# Patient Record
Sex: FEMALE | Race: WHITE | NOT HISPANIC OR LATINO | Employment: OTHER | URBAN - NONMETROPOLITAN AREA
[De-identification: names, ages, dates, MRNs, and addresses within clinical notes are randomized per-mention and may not be internally consistent; named-entity substitution may affect disease eponyms.]

---

## 2019-10-10 ENCOUNTER — HOSPITAL ENCOUNTER (INPATIENT)
Facility: HOSPITAL | Age: 74
LOS: 5 days | Discharge: NURSING, REHAB OR LONG-TERM CARE | End: 2019-10-17
Attending: EMERGENCY MEDICINE | Admitting: INTERNAL MEDICINE

## 2019-10-10 ENCOUNTER — APPOINTMENT (OUTPATIENT)
Dept: CT IMAGING | Facility: HOSPITAL | Age: 74
End: 2019-10-10

## 2019-10-10 ENCOUNTER — APPOINTMENT (OUTPATIENT)
Dept: GENERAL RADIOLOGY | Facility: HOSPITAL | Age: 74
End: 2019-10-10

## 2019-10-10 DIAGNOSIS — Z74.09 IMPAIRED MOBILITY AND ADLS: ICD-10-CM

## 2019-10-10 DIAGNOSIS — Z74.09 IMPAIRED FUNCTIONAL MOBILITY AND ENDURANCE: ICD-10-CM

## 2019-10-10 DIAGNOSIS — Z78.9 IMPAIRED MOBILITY AND ADLS: ICD-10-CM

## 2019-10-10 DIAGNOSIS — N10 ACUTE PYELONEPHRITIS: Primary | ICD-10-CM

## 2019-10-10 LAB
ALBUMIN SERPL-MCNC: 3.3 G/DL (ref 3.5–5.2)
ALBUMIN/GLOB SERPL: 0.9 G/DL
ALP SERPL-CCNC: 81 U/L (ref 39–117)
ALT SERPL W P-5'-P-CCNC: 11 U/L (ref 1–33)
ANION GAP SERPL CALCULATED.3IONS-SCNC: 12 MMOL/L (ref 5–15)
AST SERPL-CCNC: 14 U/L (ref 1–32)
BACTERIA UR QL AUTO: ABNORMAL /HPF
BASOPHILS # BLD AUTO: 0.02 10*3/MM3 (ref 0–0.2)
BASOPHILS NFR BLD AUTO: 0.2 % (ref 0–1.5)
BILIRUB SERPL-MCNC: 0.4 MG/DL (ref 0.2–1.2)
BILIRUB UR QL STRIP: NEGATIVE
BUN BLD-MCNC: 25 MG/DL (ref 8–23)
BUN/CREAT SERPL: 20.7 (ref 7–25)
CALCIUM SPEC-SCNC: 8.8 MG/DL (ref 8.6–10.5)
CHLORIDE SERPL-SCNC: 99 MMOL/L (ref 98–107)
CLARITY UR: ABNORMAL
CO2 SERPL-SCNC: 25 MMOL/L (ref 22–29)
COLOR UR: YELLOW
CREAT BLD-MCNC: 1.21 MG/DL (ref 0.57–1)
D-LACTATE SERPL-SCNC: 1.5 MMOL/L (ref 0.5–2)
DEPRECATED RDW RBC AUTO: 38.7 FL (ref 37–54)
EOSINOPHIL # BLD AUTO: 0 10*3/MM3 (ref 0–0.4)
EOSINOPHIL NFR BLD AUTO: 0 % (ref 0.3–6.2)
ERYTHROCYTE [DISTWIDTH] IN BLOOD BY AUTOMATED COUNT: 13 % (ref 12.3–15.4)
FLUAV AG NPH QL: NEGATIVE
FLUBV AG NPH QL IA: NEGATIVE
GFR SERPL CREATININE-BSD FRML MDRD: 43 ML/MIN/1.73
GLOBULIN UR ELPH-MCNC: 3.7 GM/DL
GLUCOSE BLD-MCNC: 136 MG/DL (ref 65–99)
GLUCOSE BLDC GLUCOMTR-MCNC: 82 MG/DL (ref 70–130)
GLUCOSE UR STRIP-MCNC: NEGATIVE MG/DL
HCT VFR BLD AUTO: 32.4 % (ref 34–46.6)
HGB BLD-MCNC: 10.8 G/DL (ref 12–15.9)
HGB UR QL STRIP.AUTO: ABNORMAL
HOLD SPECIMEN: NORMAL
HOLD SPECIMEN: NORMAL
HYALINE CASTS UR QL AUTO: ABNORMAL /LPF
IMM GRANULOCYTES # BLD AUTO: 0.04 10*3/MM3 (ref 0–0.05)
IMM GRANULOCYTES NFR BLD AUTO: 0.4 % (ref 0–0.5)
KETONES UR QL STRIP: NEGATIVE
LEUKOCYTE ESTERASE UR QL STRIP.AUTO: ABNORMAL
LIPASE SERPL-CCNC: 23 U/L (ref 13–60)
LYMPHOCYTES # BLD AUTO: 0.39 10*3/MM3 (ref 0.7–3.1)
LYMPHOCYTES NFR BLD AUTO: 4.1 % (ref 19.6–45.3)
MCH RBC QN AUTO: 27.1 PG (ref 26.6–33)
MCHC RBC AUTO-ENTMCNC: 33.3 G/DL (ref 31.5–35.7)
MCV RBC AUTO: 81.4 FL (ref 79–97)
MONOCYTES # BLD AUTO: 0.91 10*3/MM3 (ref 0.1–0.9)
MONOCYTES NFR BLD AUTO: 9.7 % (ref 5–12)
NEUTROPHILS # BLD AUTO: 8.04 10*3/MM3 (ref 1.7–7)
NEUTROPHILS NFR BLD AUTO: 85.6 % (ref 42.7–76)
NITRITE UR QL STRIP: POSITIVE
NRBC BLD AUTO-RTO: 0 /100 WBC (ref 0–0.2)
PH UR STRIP.AUTO: 6 [PH] (ref 5–9)
PLATELET # BLD AUTO: 168 10*3/MM3 (ref 140–450)
PMV BLD AUTO: 9.2 FL (ref 6–12)
POTASSIUM BLD-SCNC: 3.8 MMOL/L (ref 3.5–5.2)
PROT SERPL-MCNC: 7 G/DL (ref 6–8.5)
PROT UR QL STRIP: ABNORMAL
RBC # BLD AUTO: 3.98 10*6/MM3 (ref 3.77–5.28)
RBC # UR: ABNORMAL /HPF
REF LAB TEST METHOD: ABNORMAL
SODIUM BLD-SCNC: 136 MMOL/L (ref 136–145)
SP GR UR STRIP: 1.01 (ref 1–1.03)
SQUAMOUS #/AREA URNS HPF: ABNORMAL /HPF
UROBILINOGEN UR QL STRIP: ABNORMAL
WBC NRBC COR # BLD: 9.4 10*3/MM3 (ref 3.4–10.8)
WBC UR QL AUTO: ABNORMAL /HPF
WHOLE BLOOD HOLD SPECIMEN: NORMAL
WHOLE BLOOD HOLD SPECIMEN: NORMAL

## 2019-10-10 PROCEDURE — G0378 HOSPITAL OBSERVATION PER HR: HCPCS

## 2019-10-10 PROCEDURE — 80053 COMPREHEN METABOLIC PANEL: CPT | Performed by: EMERGENCY MEDICINE

## 2019-10-10 PROCEDURE — 25010000002 CEFTRIAXONE: Performed by: EMERGENCY MEDICINE

## 2019-10-10 PROCEDURE — 87088 URINE BACTERIA CULTURE: CPT | Performed by: EMERGENCY MEDICINE

## 2019-10-10 PROCEDURE — 87186 SC STD MICRODIL/AGAR DIL: CPT | Performed by: EMERGENCY MEDICINE

## 2019-10-10 PROCEDURE — 83690 ASSAY OF LIPASE: CPT | Performed by: EMERGENCY MEDICINE

## 2019-10-10 PROCEDURE — 99285 EMERGENCY DEPT VISIT HI MDM: CPT

## 2019-10-10 PROCEDURE — 81001 URINALYSIS AUTO W/SCOPE: CPT | Performed by: EMERGENCY MEDICINE

## 2019-10-10 PROCEDURE — 71046 X-RAY EXAM CHEST 2 VIEWS: CPT

## 2019-10-10 PROCEDURE — 83605 ASSAY OF LACTIC ACID: CPT | Performed by: EMERGENCY MEDICINE

## 2019-10-10 PROCEDURE — 85025 COMPLETE CBC W/AUTO DIFF WBC: CPT | Performed by: EMERGENCY MEDICINE

## 2019-10-10 PROCEDURE — 87086 URINE CULTURE/COLONY COUNT: CPT | Performed by: EMERGENCY MEDICINE

## 2019-10-10 PROCEDURE — 25010000002 HEPARIN (PORCINE) PER 1000 UNITS: Performed by: NURSE PRACTITIONER

## 2019-10-10 PROCEDURE — 74177 CT ABD & PELVIS W/CONTRAST: CPT

## 2019-10-10 PROCEDURE — 82962 GLUCOSE BLOOD TEST: CPT

## 2019-10-10 PROCEDURE — 87804 INFLUENZA ASSAY W/OPTIC: CPT | Performed by: EMERGENCY MEDICINE

## 2019-10-10 PROCEDURE — 25010000002 ONDANSETRON PER 1 MG: Performed by: NURSE PRACTITIONER

## 2019-10-10 PROCEDURE — 25010000002 IOPAMIDOL 61 % SOLUTION: Performed by: EMERGENCY MEDICINE

## 2019-10-10 RX ORDER — MELOXICAM 7.5 MG/1
7.5 TABLET ORAL DAILY
COMMUNITY
End: 2019-10-17 | Stop reason: HOSPADM

## 2019-10-10 RX ORDER — SERTRALINE HYDROCHLORIDE 25 MG/1
25 TABLET, FILM COATED ORAL DAILY
COMMUNITY

## 2019-10-10 RX ORDER — SODIUM CHLORIDE 0.9 % (FLUSH) 0.9 %
10 SYRINGE (ML) INJECTION AS NEEDED
Status: DISCONTINUED | OUTPATIENT
Start: 2019-10-10 | End: 2019-10-17 | Stop reason: HOSPADM

## 2019-10-10 RX ORDER — SIMETHICONE 80 MG
80 TABLET,CHEWABLE ORAL EVERY 6 HOURS PRN
COMMUNITY

## 2019-10-10 RX ORDER — ATORVASTATIN CALCIUM 10 MG/1
10 TABLET, FILM COATED ORAL NIGHTLY
Status: DISCONTINUED | OUTPATIENT
Start: 2019-10-10 | End: 2019-10-17 | Stop reason: HOSPADM

## 2019-10-10 RX ORDER — INSULIN GLARGINE 100 [IU]/ML
50 INJECTION, SOLUTION SUBCUTANEOUS DAILY
COMMUNITY

## 2019-10-10 RX ORDER — DEXTROSE MONOHYDRATE 25 G/50ML
25 INJECTION, SOLUTION INTRAVENOUS
Status: DISCONTINUED | OUTPATIENT
Start: 2019-10-10 | End: 2019-10-17 | Stop reason: HOSPADM

## 2019-10-10 RX ORDER — HYDROCHLOROTHIAZIDE 25 MG/1
25 TABLET ORAL DAILY
Status: DISCONTINUED | OUTPATIENT
Start: 2019-10-11 | End: 2019-10-11

## 2019-10-10 RX ORDER — METOPROLOL TARTRATE 50 MG/1
50 TABLET, FILM COATED ORAL DAILY
Status: DISCONTINUED | OUTPATIENT
Start: 2019-10-11 | End: 2019-10-17 | Stop reason: HOSPADM

## 2019-10-10 RX ORDER — HYDROCHLOROTHIAZIDE 25 MG/1
25 TABLET ORAL DAILY
COMMUNITY

## 2019-10-10 RX ORDER — ATORVASTATIN CALCIUM 10 MG/1
10 TABLET, FILM COATED ORAL NIGHTLY
COMMUNITY

## 2019-10-10 RX ORDER — ACETAMINOPHEN 325 MG/1
TABLET ORAL
Status: DISCONTINUED
Start: 2019-10-10 | End: 2019-10-17 | Stop reason: HOSPADM

## 2019-10-10 RX ORDER — ACETAMINOPHEN 325 MG/1
650 TABLET ORAL ONCE
Status: DISCONTINUED | OUTPATIENT
Start: 2019-10-10 | End: 2019-10-10

## 2019-10-10 RX ORDER — ACETAMINOPHEN 650 MG/1
650 SUPPOSITORY RECTAL ONCE
Status: COMPLETED | OUTPATIENT
Start: 2019-10-10 | End: 2019-10-10

## 2019-10-10 RX ORDER — RISPERIDONE 0.25 MG/1
0.25 TABLET ORAL DAILY
COMMUNITY

## 2019-10-10 RX ORDER — ACETAMINOPHEN 325 MG/1
650 TABLET ORAL EVERY 4 HOURS PRN
Status: DISCONTINUED | OUTPATIENT
Start: 2019-10-10 | End: 2019-10-17 | Stop reason: HOSPADM

## 2019-10-10 RX ORDER — PANTOPRAZOLE SODIUM 40 MG/1
40 TABLET, DELAYED RELEASE ORAL DAILY
COMMUNITY

## 2019-10-10 RX ORDER — ACETAMINOPHEN 500 MG
500 TABLET ORAL EVERY 6 HOURS PRN
COMMUNITY

## 2019-10-10 RX ORDER — ACETAMINOPHEN 160 MG/5ML
650 SOLUTION ORAL EVERY 4 HOURS PRN
Status: DISCONTINUED | OUTPATIENT
Start: 2019-10-10 | End: 2019-10-17 | Stop reason: HOSPADM

## 2019-10-10 RX ORDER — CLOPIDOGREL BISULFATE 75 MG/1
75 TABLET ORAL DAILY
Status: DISCONTINUED | OUTPATIENT
Start: 2019-10-11 | End: 2019-10-17 | Stop reason: HOSPADM

## 2019-10-10 RX ORDER — CLONAZEPAM 0.5 MG/1
0.5 TABLET ORAL 2 TIMES DAILY
Status: DISCONTINUED | OUTPATIENT
Start: 2019-10-10 | End: 2019-10-17 | Stop reason: HOSPADM

## 2019-10-10 RX ORDER — NICOTINE POLACRILEX 4 MG
15 LOZENGE BUCCAL
Status: DISCONTINUED | OUTPATIENT
Start: 2019-10-10 | End: 2019-10-17 | Stop reason: HOSPADM

## 2019-10-10 RX ORDER — CLONAZEPAM 0.5 MG/1
0.5 TABLET ORAL 3 TIMES DAILY PRN
COMMUNITY
End: 2019-10-17 | Stop reason: HOSPADM

## 2019-10-10 RX ORDER — ONDANSETRON 2 MG/ML
4 INJECTION INTRAMUSCULAR; INTRAVENOUS EVERY 6 HOURS PRN
Status: DISCONTINUED | OUTPATIENT
Start: 2019-10-10 | End: 2019-10-17 | Stop reason: HOSPADM

## 2019-10-10 RX ORDER — RISPERIDONE 0.25 MG/1
0.25 TABLET ORAL DAILY
Status: DISCONTINUED | OUTPATIENT
Start: 2019-10-10 | End: 2019-10-17 | Stop reason: HOSPADM

## 2019-10-10 RX ORDER — CETIRIZINE HYDROCHLORIDE 5 MG/1
5 TABLET ORAL DAILY
Status: DISCONTINUED | OUTPATIENT
Start: 2019-10-10 | End: 2019-10-11

## 2019-10-10 RX ORDER — SODIUM CHLORIDE 9 MG/ML
50 INJECTION, SOLUTION INTRAVENOUS CONTINUOUS
Status: DISCONTINUED | OUTPATIENT
Start: 2019-10-10 | End: 2019-10-13

## 2019-10-10 RX ORDER — PANTOPRAZOLE SODIUM 40 MG/1
40 TABLET, DELAYED RELEASE ORAL
Status: DISCONTINUED | OUTPATIENT
Start: 2019-10-11 | End: 2019-10-17 | Stop reason: HOSPADM

## 2019-10-10 RX ORDER — SODIUM CHLORIDE 0.9 % (FLUSH) 0.9 %
10 SYRINGE (ML) INJECTION EVERY 12 HOURS SCHEDULED
Status: DISCONTINUED | OUTPATIENT
Start: 2019-10-10 | End: 2019-10-17 | Stop reason: HOSPADM

## 2019-10-10 RX ORDER — HEPARIN SODIUM 5000 [USP'U]/ML
5000 INJECTION, SOLUTION INTRAVENOUS; SUBCUTANEOUS EVERY 12 HOURS SCHEDULED
Status: DISCONTINUED | OUTPATIENT
Start: 2019-10-10 | End: 2019-10-17 | Stop reason: HOSPADM

## 2019-10-10 RX ORDER — CLOPIDOGREL BISULFATE 75 MG/1
75 TABLET ORAL DAILY
COMMUNITY

## 2019-10-10 RX ORDER — METOPROLOL TARTRATE 50 MG/1
50 TABLET, FILM COATED ORAL DAILY
COMMUNITY

## 2019-10-10 RX ORDER — ACETAMINOPHEN 650 MG/1
650 SUPPOSITORY RECTAL EVERY 4 HOURS PRN
Status: DISCONTINUED | OUTPATIENT
Start: 2019-10-10 | End: 2019-10-17 | Stop reason: HOSPADM

## 2019-10-10 RX ADMIN — ACETAMINOPHEN 650 MG: 650 SUPPOSITORY RECTAL at 18:46

## 2019-10-10 RX ADMIN — HEPARIN SODIUM 5000 UNITS: 5000 INJECTION INTRAVENOUS; SUBCUTANEOUS at 22:03

## 2019-10-10 RX ADMIN — SODIUM CHLORIDE 75 ML/HR: 9 INJECTION, SOLUTION INTRAVENOUS at 22:07

## 2019-10-10 RX ADMIN — ATORVASTATIN CALCIUM 10 MG: 10 TABLET, FILM COATED ORAL at 22:05

## 2019-10-10 RX ADMIN — SODIUM CHLORIDE, PRESERVATIVE FREE 10 ML: 5 INJECTION INTRAVENOUS at 22:06

## 2019-10-10 RX ADMIN — CEFTRIAXONE 1 G: 1 INJECTION, POWDER, FOR SOLUTION INTRAMUSCULAR; INTRAVENOUS at 17:50

## 2019-10-10 RX ADMIN — CLONAZEPAM 0.5 MG: 0.5 TABLET ORAL at 22:03

## 2019-10-10 RX ADMIN — ONDANSETRON 4 MG: 2 INJECTION INTRAMUSCULAR; INTRAVENOUS at 22:04

## 2019-10-10 RX ADMIN — IOPAMIDOL 80 ML: 612 INJECTION, SOLUTION INTRAVENOUS at 16:12

## 2019-10-10 RX ADMIN — ACETAMINOPHEN: 325 SUPPOSITORY RECTAL at 18:50

## 2019-10-10 RX ADMIN — CETIRIZINE HYDROCHLORIDE 5 MG: 5 TABLET ORAL at 22:04

## 2019-10-10 RX ADMIN — RISPERIDONE 0.25 MG: 0.25 TABLET ORAL at 22:05

## 2019-10-10 NOTE — H&P
[facility] Medicine Admission      Date of Admission: 10/10/2019      Primary Care Physician: Austyn Pelayo MD      Chief Complaint: Fever, cough and fatigue    HPI:  This is a 74 year old  female with PMH of CAD, anxiety, DM II, HTN and HLD that presented from a SNF secondary to fever, chills and fatigue.  The patient is deaf and unable to give any medical history.  Patient's sister is at bedside and states the patient had some issues with constipation yesterday and was given fleets enemas yesterday.  She became lethargic and spiked a fever this am.  CT of the abdomen and pelvis shows perinephric left-sided inflammatory changes around the left kidney consistent with pyelonephritis.      Concurrent Medical History: CAD, DM II, anxiety, HTN and HLD.      Past Surgical History:  has no past surgical history on file.    Family History: HTN    Social History:  Patient does not smoke or drink.     Allergies: No Known Allergies    Medications:   Plavix 75 mg daily  Lipitor 10 mg daily  PRN Tylenol every 6 hours  Hydrochlorothiazide 25 mg daily  Basaglar Insulin 50 units daily  Metformin 1,000 mg twice a day  Lopressor 50 mg twice a day  Protonix 40 mg daily  Risperdal at bedtime  Klonopin 0.25 mg TID PRN    Review of Systems:  Altered mental status     Otherwise complete ROS is negative except as mentioned above.    Physical Exam:   Temp:  [97.7 °F (36.5 °C)-104.5 °F (40.3 °C)] 104.5 °F (40.3 °C)  Heart Rate:  [] 129  Resp:  [20] 20  BP: (129-174)/(59-74) 174/74  Physical Exam   Constitutional: She appears well-developed and well-nourished. She appears lethargic.   HENT:   Head: Normocephalic and atraumatic.   Patient is DEAF   Eyes: EOM are normal. Pupils are equal, round, and reactive to light.   Neck: Normal range of motion. Neck supple.   Cardiovascular: Normal rate and regular rhythm.   Pulmonary/Chest: Effort normal and breath sounds normal.   Abdominal:  Soft. Bowel sounds are normal.   Musculoskeletal: Normal range of motion.   Neurological: She appears lethargic.   Skin: Skin is warm and dry.   Psychiatric: She has a normal mood and affect. Her behavior is normal.     Results Reviewed:  I have personally reviewed current lab, radiology, and data and agree with results.  Lab Results (last 24 hours)     Procedure Component Value Units Date/Time    [place] Draw [[id number]] Collected:  10/10/19 1517    Specimen:  Blood Updated:  10/10/19 1630    Narrative:       The following orders were created for panel order [place] Draw.  Procedure                               Abnormality         Status                     ---------                               -----------         ------                     Light Blue Top[[id number]]                                   Final result               Green Top (Gel)[[id number]]                                  Final result               Lavender Top[[id number]]                                     Final result               Gold Top - SST[[id number]]                                   Final result                 Please view results for these tests on the individual orders.    Light Blue Top [[id number]] Collected:  10/10/19 1517    Specimen:  Blood Updated:  10/10/19 1630     Extra Tube hold for add-on     Comment: Auto resulted       Green Top (Gel) [[id number]] Collected:  10/10/19 1517    Specimen:  Blood Updated:  10/10/19 1630     Extra Tube Hold for add-ons.     Comment: Auto resulted.       Lavender Top [[id number]] Collected:  10/10/19 1517    Specimen:  Blood Updated:  10/10/19 1630     Extra Tube hold for add-on     Comment: Auto resulted       Gold Top - SST [[id number]] Collected:  10/10/19 1517    Specimen:  Blood Updated:  10/10/19 1630     Extra Tube Hold for add-ons.     Comment: Auto resulted.       Influenza Antigen, Rapid - Swab, Nasopharynx [[id number]]  (Normal) Collected:  10/10/19 1546    Specimen:  Swab from Nasopharynx  Updated:  10/10/19 1622     Influenza A Ag, EIA Negative     Influenza B Ag, EIA Negative    Lactic Acid, Plasma [[id number]]  (Normal) Collected:  10/10/19 1534    Specimen:  Blood Updated:  10/10/19 1557     Lactate 1.5 mmol/L     Urinalysis, Microscopic Only - Urine, Clean Catch [[id number]]  (Abnormal) Collected:  10/10/19 1533    Specimen:  Urine, Clean Catch Updated:  10/10/19 1550     RBC, UA 3-5 /HPF      WBC, UA Too Numerous to Count /HPF      Bacteria, UA 3+ /HPF      Squamous Epithelial Cells, UA None Seen /HPF      Hyaline Casts, UA 3-6 /LPF      Methodology Automated Microscopy    Urine Culture - Urine, Urine, Clean Catch [[id number]] Collected:  10/10/19 1533    Specimen:  Urine, Clean Catch Updated:  10/10/19 1550    Urinalysis With Culture If Indicated - Urine, Clean Catch [[id number]]  (Abnormal) Collected:  10/10/19 1533    Specimen:  Urine, Clean Catch Updated:  10/10/19 1550     Color, UA Yellow     Appearance, UA Cloudy     pH, UA 6.0     Specific Gravity, UA 1.013     Glucose, UA Negative     Ketones, UA Negative     Bilirubin, UA Negative     Blood, UA Small (1+)     Protein, UA 30 mg/dL (1+)     Leuk Esterase, UA Large (3+)     Nitrite, UA Positive     Urobilinogen, UA 0.2 E.U./dL    Comprehensive Metabolic Panel [[id number]]  (Abnormal) Collected:  10/10/19 1517    Specimen:  Blood Updated:  10/10/19 1543     Glucose 136 mg/dL      BUN 25 mg/dL      Creatinine 1.21 mg/dL      Sodium 136 mmol/L      Potassium 3.8 mmol/L      Chloride 99 mmol/L      CO2 25.0 mmol/L      Calcium 8.8 mg/dL      Total Protein 7.0 g/dL      Albumin 3.30 g/dL      ALT (SGPT) 11 U/L      AST (SGOT) 14 U/L      Alkaline Phosphatase 81 U/L      Total Bilirubin 0.4 mg/dL      eGFR Non African Amer 43 mL/min/1.73      Globulin 3.7 gm/dL      A/G Ratio 0.9 g/dL      BUN/Creatinine Ratio 20.7     Anion Gap 12.0 mmol/L     Narrative:       GFR Normal >60  Chronic Kidney Disease <60  Kidney Failure <15    Lipase [[id number]]   (Normal) Collected:  10/10/19 1517    Specimen:  Blood Updated:  10/10/19 1543     Lipase 23 U/L     CBC & Differential [[id number]] Collected:  10/10/19 1517    Specimen:  Blood Updated:  10/10/19 1521    Narrative:       The following orders were created for panel order CBC & Differential.  Procedure                               Abnormality         Status                     ---------                               -----------         ------                     CBC Auto Differential[[id number]]        Abnormal            Final result                 Please view results for these tests on the individual orders.    CBC Auto Differential [[id number]]  (Abnormal) Collected:  10/10/19 1517    Specimen:  Blood Updated:  10/10/19 1521     WBC 9.40 10*3/mm3      RBC 3.98 10*6/mm3      Hemoglobin 10.8 g/dL      Hematocrit 32.4 %      MCV 81.4 fL      MCH 27.1 pg      MCHC 33.3 g/dL      RDW 13.0 %      RDW-SD 38.7 fl      MPV 9.2 fL      Platelets 168 10*3/mm3      Neutrophil % 85.6 %      Lymphocyte % 4.1 %      Monocyte % 9.7 %      Eosinophil % 0.0 %      Basophil % 0.2 %      Immature Grans % 0.4 %      Neutrophils, Absolute 8.04 10*3/mm3      Lymphocytes, Absolute 0.39 10*3/mm3      Monocytes, Absolute 0.91 10*3/mm3      Eosinophils, Absolute 0.00 10*3/mm3      Basophils, Absolute 0.02 10*3/mm3      Immature Grans, Absolute 0.04 10*3/mm3      nRBC 0.0 /100 WBC         Imaging Results (last 24 hours)     Procedure Component Value Units Date/Time    CT Abdomen Pelvis With Contrast [[id number]] Collected:  10/10/19 1601     Updated:  10/10/19 1648    Narrative:       CT abdomen, pelvis with contrast.       CLINICAL INDICATION: Abdominal pain, vomiting.       COMPARISON: CT July 10, 2015.       TECHNIQUE: 80 mL of Visipaque 320  nonionic IV contrast. Helical  scanning with axial and coronal reformations Soft tissue, lung,  and bone windows reviewed.    This exam was performed according to our departmental  dose-optimization  program, which includes automated exposure  control, adjustment of the mA and/or kV according to patient size  and/or use of iterative reconstruction technique.    ABDOMEN CT FINDINGS: The visualized lung bases show minor  dependent changes. Liver, spleen, pancreas, adrenal glands and  right kidney are are normal in appearance. There are perinephric  inflammatory changes (stranding) surrounding the left kidney  without evidence of hydronephrosis. The above findings may  indicate inflammation, infection, early changes of  pyelonephritis. Left kidney is otherwise unremarkable. There are  several small retroperitoneal lymph nodes adjacent to the left  periaortic region similar to prior exam.    Bowel grossly unremarkable.    There is a tiny calcification within the gallbladder wall  possibly cholesterol polyp. This however is unchanged since prior  CT examination July 10, 2015 and therefore is of incidental  significance.    No evidence of pathologically enlarged nodes, free air, or free  fluid. Degenerative changes of the lumbar spine.  The bones are otherwise unremarkable.    PELVIS CT FINDINGS: There is a 2.85 cm calcified subserosal  fibroid arising from the right side of the uterus. The pelvis  otherwise unremarkable.. No evidence of pathologically enlarged  nodes, free air, or free fluid.     The bones are grossly unremarkable.      Impression:       CONCLUSION: There are perinephric left-sided inflammatory changes  surrounding the left kidney more pronounced than on prior  examination. This may suggest inflammation, infection i.e. early  changes of pyelonephritis . No hydronephrotic changes.    2.5 cm calcified subserosal fibroid arising from the uterine  fundus. Pelvis otherwise unremarkable.    CT abdomen, pelvis with contrast is otherwise unremarkable.      Electronically signed by:  Carlos Greenwood MD  10/10/2019 4:44 PM CDT  Workstation: [id number]            Assessment:  Active Hospital Problems    Diagnosis POA    • Acute pyelonephritis [N10] Yes       Plan:  1.  Acute pyelonephritis:  IVF and IV Rocephin.  Urine culture pending.  Tylenol for fever.   2.  Hypertension/ CAD: Continue home Plavix, HCTZ and Metoprolol.   3.  Anxiety:  Continue home Clonazepam and Risperadone.   4.  Diabetes mellitus, type II:  SSI.  Patient takes 50 units long-acting q am at SNF.  Will start 5 units q am secondary to decreased appetite.  Will increase as needed. Hold Metformin.    5.  GERD:  Continue home Protonix.   6.  Hyperlipidemia:  Continue home statin.   7.  Acute kidney injury:  IVF.    8.  Cough:  Chest xray pending.       I discussed the patients findings and my recommendations with: Patient's sister    DVT ppx:  Heparin subq      This document has been electronically signed by MACHO Posada on October 10, 2019 6:39 PM

## 2019-10-10 NOTE — ED PROVIDER NOTES
Subjective   74 years old female resident of nursing home with history of diabetes mellitus, nonverbal is brought in the ER with chief complaint of abdominal distention and pain along with vomiting since yesterday.  Nursing home was concerned about obstruction.  She has been given 1 enema last   Night for possible constipation but no relief.  This morning she was spiking a fever of 101.        History provided by:  Patient  History limited by:  Patient nonverbal  Abdominal Pain   Pain location:  Generalized  Pain radiates to:  Does not radiate  Pain severity:  Moderate  Onset quality:  Gradual  Duration:  1 day  Timing:  Constant  Progression:  Worsening  Chronicity:  New  Relieved by:  Nothing  Worsened by:  Nothing  Associated symptoms: constipation, fever, nausea and vomiting    Associated symptoms: no diarrhea        Review of Systems   Unable to perform ROS: Patient nonverbal   Constitutional: Positive for fever.   Gastrointestinal: Positive for abdominal pain, constipation, nausea and vomiting. Negative for diarrhea.       No past medical history on file.    No Known Allergies    No past surgical history on file.    No family history on file.    Social History     Socioeconomic History   • Marital status: Single     Spouse name: Not on file   • Number of children: Not on file   • Years of education: Not on file   • Highest education level: Not on file           Objective   Physical Exam   Constitutional: She is oriented to person, place, and time. She appears well-developed and well-nourished.   HENT:   Head: Atraumatic.   Eyes: EOM are normal.   Cardiovascular: Normal rate, regular rhythm and normal heart sounds.   Pulmonary/Chest: Effort normal and breath sounds normal.   Abdominal: Normal appearance and bowel sounds are normal. There is generalized tenderness.   Neurological: She is alert and oriented to person, place, and time.   Skin: Skin is warm and dry. Capillary refill takes less than 2 seconds.    Nursing note and vitals reviewed.      Procedures           ED Course                  MDM  Number of Diagnoses or Management Options  Acute pyelonephritis:   Diagnosis management comments: She is given a liter of fluid by EMS.  Work-up is consistent with acute left pyelonephritis.  Started on antibiotics.  Discussed with Dr. Almonte and patient is accepted for admission.       Amount and/or Complexity of Data Reviewed  Clinical lab tests: ordered and reviewed  Tests in the radiology section of CPT®: ordered and reviewed  Discuss the patient with other providers: yes      Labs Reviewed   COMPREHENSIVE METABOLIC PANEL - Abnormal; Notable for the following components:       Result Value    Glucose 136 (*)     BUN 25 (*)     Creatinine 1.21 (*)     Albumin 3.30 (*)     eGFR Non  Amer 43 (*)     All other components within normal limits    Narrative:     GFR Normal >60  Chronic Kidney Disease <60  Kidney Failure <15   URINALYSIS W/ CULTURE IF INDICATED - Abnormal; Notable for the following components:    Appearance, UA Cloudy (*)     Blood, UA Small (1+) (*)     Protein, UA 30 mg/dL (1+) (*)     Leuk Esterase, UA Large (3+) (*)     Nitrite, UA Positive (*)     All other components within normal limits   CBC WITH AUTO DIFFERENTIAL - Abnormal; Notable for the following components:    Hemoglobin 10.8 (*)     Hematocrit 32.4 (*)     Neutrophil % 85.6 (*)     Lymphocyte % 4.1 (*)     Eosinophil % 0.0 (*)     Neutrophils, Absolute 8.04 (*)     Lymphocytes, Absolute 0.39 (*)     Monocytes, Absolute 0.91 (*)     All other components within normal limits   URINALYSIS, MICROSCOPIC ONLY - Abnormal; Notable for the following components:    RBC, UA 3-5 (*)     WBC, UA Too Numerous to Count (*)     Bacteria, UA 3+ (*)     All other components within normal limits   INFLUENZA ANTIGEN, RAPID - Normal   LIPASE - Normal   LACTIC ACID, PLASMA - Normal   URINE CULTURE   RAINBOW DRAW    Narrative:     The following orders were  created for panel order [place] Draw.  Procedure                               Abnormality         Status                     ---------                               -----------         ------                     Light Blue Top[[id number]]                                   Final result               Green Top (Gel)[[id number]]                                  Final result               Lavender Top[[id number]]                                     Final result               Gold Top - SST[[id number]]                                   Final result                 Please view results for these tests on the individual orders.   RAINBOW DRAW    Narrative:     The following orders were created for panel order [place] Draw.  Procedure                               Abnormality         Status                     ---------                               -----------         ------                     Light Blue Top[[id number]]                                                              Green Top (Gel)[[id number]]                                                             Lavender Top[[id number]]                                                                Gold Top - SST[[id number]]                                                                Please view results for these tests on the individual orders.   CBC AND DIFFERENTIAL    Narrative:     The following orders were created for panel order CBC & Differential.  Procedure                               Abnormality         Status                     ---------                               -----------         ------                     CBC Auto Differential[[id number]]        Abnormal            Final result                 Please view results for these tests on the individual orders.   LIGHT BLUE TOP   GREEN TOP   LAVENDER TOP   GOLD TOP - SST   LIGHT BLUE TOP   GREEN TOP   LAVENDER TOP   GOLD TOP - SST       Ct Abdomen Pelvis With Contrast    Result Date: 10/10/2019  Narrative: CT  abdomen, pelvis with contrast. CLINICAL INDICATION: Abdominal pain, vomiting.   COMPARISON: CT July 10, 2015.   TECHNIQUE: 80 mL of Visipaque 320  nonionic IV contrast. Helical scanning with axial and coronal reformations Soft tissue, lung, and bone windows reviewed. This exam was performed according to our departmental dose-optimization program, which includes automated exposure control, adjustment of the mA and/or kV according to patient size and/or use of iterative reconstruction technique. ABDOMEN CT FINDINGS: The visualized lung bases show minor dependent changes. Liver, spleen, pancreas, adrenal glands and right kidney are are normal in appearance. There are perinephric inflammatory changes (stranding) surrounding the left kidney without evidence of hydronephrosis. The above findings may indicate inflammation, infection, early changes of pyelonephritis. Left kidney is otherwise unremarkable. There are several small retroperitoneal lymph nodes adjacent to the left periaortic region similar to prior exam. Bowel grossly unremarkable. There is a tiny calcification within the gallbladder wall possibly cholesterol polyp. This however is unchanged since prior CT examination July 10, 2015 and therefore is of incidental significance. No evidence of pathologically enlarged nodes, free air, or free fluid. Degenerative changes of the lumbar spine. The bones are otherwise unremarkable. PELVIS CT FINDINGS: There is a 2.85 cm calcified subserosal fibroid arising from the right side of the uterus. The pelvis otherwise unremarkable.. No evidence of pathologically enlarged nodes, free air, or free fluid. The bones are grossly unremarkable.     Impression: CONCLUSION: There are perinephric left-sided inflammatory changes surrounding the left kidney more pronounced than on prior examination. This may suggest inflammation, infection i.e. early changes of pyelonephritis . No hydronephrotic changes. 2.5 cm calcified subserosal  fibroid arising from the uterine fundus. Pelvis otherwise unremarkable. CT abdomen, pelvis with contrast is otherwise unremarkable.  Electronically signed by:  Carlos Greenwood MD  10/10/2019 4:44 PM CDT Workstation: [id number]        Final diagnoses:   Acute pyelonephritis              Bob Washburn MD  10/10/19 1725

## 2019-10-11 LAB
ALBUMIN SERPL-MCNC: 3.1 G/DL (ref 3.5–5.2)
ALBUMIN/GLOB SERPL: 0.9 G/DL
ALP SERPL-CCNC: 79 U/L (ref 39–117)
ALT SERPL W P-5'-P-CCNC: 30 U/L (ref 1–33)
ANION GAP SERPL CALCULATED.3IONS-SCNC: 10 MMOL/L (ref 5–15)
AST SERPL-CCNC: 145 U/L (ref 1–32)
B PERT DNA SPEC QL NAA+PROBE: NOT DETECTED
BASOPHILS # BLD AUTO: 0.02 10*3/MM3 (ref 0–0.2)
BASOPHILS NFR BLD AUTO: 0.2 % (ref 0–1.5)
BILIRUB SERPL-MCNC: 0.3 MG/DL (ref 0.2–1.2)
BUN BLD-MCNC: 34 MG/DL (ref 8–23)
BUN/CREAT SERPL: 16.9 (ref 7–25)
C PNEUM DNA NPH QL NAA+NON-PROBE: NOT DETECTED
CALCIUM SPEC-SCNC: 8.4 MG/DL (ref 8.6–10.5)
CHLORIDE SERPL-SCNC: 99 MMOL/L (ref 98–107)
CO2 SERPL-SCNC: 26 MMOL/L (ref 22–29)
CREAT BLD-MCNC: 2.01 MG/DL (ref 0.57–1)
DEPRECATED RDW RBC AUTO: 41.1 FL (ref 37–54)
EOSINOPHIL # BLD AUTO: 0 10*3/MM3 (ref 0–0.4)
EOSINOPHIL NFR BLD AUTO: 0 % (ref 0.3–6.2)
ERYTHROCYTE [DISTWIDTH] IN BLOOD BY AUTOMATED COUNT: 13.6 % (ref 12.3–15.4)
FLUAV H1 2009 PAND RNA NPH QL NAA+PROBE: NOT DETECTED
FLUAV H1 HA GENE NPH QL NAA+PROBE: NOT DETECTED
FLUAV H3 RNA NPH QL NAA+PROBE: NOT DETECTED
FLUAV SUBTYP SPEC NAA+PROBE: NOT DETECTED
FLUBV RNA ISLT QL NAA+PROBE: NOT DETECTED
GFR SERPL CREATININE-BSD FRML MDRD: 24 ML/MIN/1.73
GLOBULIN UR ELPH-MCNC: 3.4 GM/DL
GLUCOSE BLD-MCNC: 268 MG/DL (ref 65–99)
GLUCOSE BLDC GLUCOMTR-MCNC: 162 MG/DL (ref 70–130)
GLUCOSE BLDC GLUCOMTR-MCNC: 197 MG/DL (ref 70–130)
GLUCOSE BLDC GLUCOMTR-MCNC: 244 MG/DL (ref 70–130)
HADV DNA SPEC NAA+PROBE: NOT DETECTED
HCOV 229E RNA SPEC QL NAA+PROBE: NOT DETECTED
HCOV HKU1 RNA SPEC QL NAA+PROBE: NOT DETECTED
HCOV NL63 RNA SPEC QL NAA+PROBE: NOT DETECTED
HCOV OC43 RNA SPEC QL NAA+PROBE: NOT DETECTED
HCT VFR BLD AUTO: 32.3 % (ref 34–46.6)
HGB BLD-MCNC: 10.4 G/DL (ref 12–15.9)
HMPV RNA NPH QL NAA+NON-PROBE: NOT DETECTED
HPIV1 RNA SPEC QL NAA+PROBE: NOT DETECTED
HPIV2 RNA SPEC QL NAA+PROBE: NOT DETECTED
HPIV3 RNA NPH QL NAA+PROBE: NOT DETECTED
HPIV4 P GENE NPH QL NAA+PROBE: NOT DETECTED
IMM GRANULOCYTES # BLD AUTO: 0.07 10*3/MM3 (ref 0–0.05)
IMM GRANULOCYTES NFR BLD AUTO: 0.6 % (ref 0–0.5)
L PNEUMO1 AG UR QL IA: NEGATIVE
LYMPHOCYTES # BLD AUTO: 0.69 10*3/MM3 (ref 0.7–3.1)
LYMPHOCYTES NFR BLD AUTO: 5.9 % (ref 19.6–45.3)
M PNEUMO IGG SER IA-ACNC: NOT DETECTED
MCH RBC QN AUTO: 26.8 PG (ref 26.6–33)
MCHC RBC AUTO-ENTMCNC: 32.2 G/DL (ref 31.5–35.7)
MCV RBC AUTO: 83.2 FL (ref 79–97)
MONOCYTES # BLD AUTO: 0.68 10*3/MM3 (ref 0.1–0.9)
MONOCYTES NFR BLD AUTO: 5.8 % (ref 5–12)
NEUTROPHILS # BLD AUTO: 10.21 10*3/MM3 (ref 1.7–7)
NEUTROPHILS NFR BLD AUTO: 87.5 % (ref 42.7–76)
NRBC BLD AUTO-RTO: 0 /100 WBC (ref 0–0.2)
PLATELET # BLD AUTO: 158 10*3/MM3 (ref 140–450)
PMV BLD AUTO: 9.6 FL (ref 6–12)
POTASSIUM BLD-SCNC: 3.8 MMOL/L (ref 3.5–5.2)
PROT SERPL-MCNC: 6.5 G/DL (ref 6–8.5)
RBC # BLD AUTO: 3.88 10*6/MM3 (ref 3.77–5.28)
RHINOVIRUS RNA SPEC NAA+PROBE: NOT DETECTED
RSV RNA NPH QL NAA+NON-PROBE: NOT DETECTED
S PNEUM AG SPEC QL LA: NEGATIVE
SODIUM BLD-SCNC: 135 MMOL/L (ref 136–145)
WBC NRBC COR # BLD: 11.67 10*3/MM3 (ref 3.4–10.8)

## 2019-10-11 PROCEDURE — 63710000001 INSULIN ASPART PER 5 UNITS: Performed by: NURSE PRACTITIONER

## 2019-10-11 PROCEDURE — 25010000002 AZITHROMYCIN PER 500 MG: Performed by: NURSE PRACTITIONER

## 2019-10-11 PROCEDURE — 87899 AGENT NOS ASSAY W/OPTIC: CPT | Performed by: NURSE PRACTITIONER

## 2019-10-11 PROCEDURE — 93010 ELECTROCARDIOGRAM REPORT: CPT | Performed by: INTERNAL MEDICINE

## 2019-10-11 PROCEDURE — 0099U HC BIOFIRE FILMARRAY RESP PANEL 1: CPT | Performed by: NURSE PRACTITIONER

## 2019-10-11 PROCEDURE — 93005 ELECTROCARDIOGRAM TRACING: CPT | Performed by: NURSE PRACTITIONER

## 2019-10-11 PROCEDURE — 63710000001 INSULIN DETEMIR PER 5 UNITS: Performed by: NURSE PRACTITIONER

## 2019-10-11 PROCEDURE — 25010000002 HEPARIN (PORCINE) PER 1000 UNITS: Performed by: NURSE PRACTITIONER

## 2019-10-11 PROCEDURE — 85025 COMPLETE CBC W/AUTO DIFF WBC: CPT | Performed by: NURSE PRACTITIONER

## 2019-10-11 PROCEDURE — 82962 GLUCOSE BLOOD TEST: CPT

## 2019-10-11 PROCEDURE — 25010000002 CEFTRIAXONE PER 250 MG: Performed by: NURSE PRACTITIONER

## 2019-10-11 PROCEDURE — 97162 PT EVAL MOD COMPLEX 30 MIN: CPT | Performed by: PHYSICAL THERAPIST

## 2019-10-11 PROCEDURE — 80053 COMPREHEN METABOLIC PANEL: CPT | Performed by: NURSE PRACTITIONER

## 2019-10-11 PROCEDURE — G0378 HOSPITAL OBSERVATION PER HR: HCPCS

## 2019-10-11 RX ORDER — HYDROCODONE BITARTRATE AND ACETAMINOPHEN 7.5; 325 MG/1; MG/1
1 TABLET ORAL EVERY 6 HOURS PRN
Status: DISCONTINUED | OUTPATIENT
Start: 2019-10-11 | End: 2019-10-17 | Stop reason: HOSPADM

## 2019-10-11 RX ORDER — ALUMINA, MAGNESIA, AND SIMETHICONE 2400; 2400; 240 MG/30ML; MG/30ML; MG/30ML
15 SUSPENSION ORAL EVERY 6 HOURS PRN
Status: DISCONTINUED | OUTPATIENT
Start: 2019-10-11 | End: 2019-10-17 | Stop reason: HOSPADM

## 2019-10-11 RX ADMIN — SODIUM CHLORIDE, PRESERVATIVE FREE 10 ML: 5 INJECTION INTRAVENOUS at 20:21

## 2019-10-11 RX ADMIN — CLONAZEPAM 0.5 MG: 0.5 TABLET ORAL at 20:21

## 2019-10-11 RX ADMIN — HYDROCHLOROTHIAZIDE 25 MG: 25 TABLET ORAL at 09:42

## 2019-10-11 RX ADMIN — METOPROLOL TARTRATE 50 MG: 50 TABLET ORAL at 09:42

## 2019-10-11 RX ADMIN — INSULIN DETEMIR 10 UNITS: 100 INJECTION, SOLUTION SUBCUTANEOUS at 09:42

## 2019-10-11 RX ADMIN — SODIUM CHLORIDE 75 ML/HR: 9 INJECTION, SOLUTION INTRAVENOUS at 20:41

## 2019-10-11 RX ADMIN — INSULIN ASPART 2 UNITS: 100 INJECTION, SOLUTION INTRAVENOUS; SUBCUTANEOUS at 20:43

## 2019-10-11 RX ADMIN — HEPARIN SODIUM 5000 UNITS: 5000 INJECTION INTRAVENOUS; SUBCUTANEOUS at 20:21

## 2019-10-11 RX ADMIN — ATORVASTATIN CALCIUM 10 MG: 10 TABLET, FILM COATED ORAL at 20:21

## 2019-10-11 RX ADMIN — INSULIN ASPART 2 UNITS: 100 INJECTION, SOLUTION INTRAVENOUS; SUBCUTANEOUS at 09:44

## 2019-10-11 RX ADMIN — CLOPIDOGREL BISULFATE 75 MG: 75 TABLET ORAL at 09:42

## 2019-10-11 RX ADMIN — HYDROCODONE BITARTRATE AND ACETAMINOPHEN 1 TABLET: 7.5; 325 TABLET ORAL at 19:25

## 2019-10-11 RX ADMIN — INSULIN ASPART 4 UNITS: 100 INJECTION, SOLUTION INTRAVENOUS; SUBCUTANEOUS at 12:24

## 2019-10-11 RX ADMIN — AZITHROMYCIN MONOHYDRATE 500 MG: 500 INJECTION, POWDER, LYOPHILIZED, FOR SOLUTION INTRAVENOUS at 18:05

## 2019-10-11 RX ADMIN — RISPERIDONE 0.25 MG: 0.25 TABLET ORAL at 09:42

## 2019-10-11 RX ADMIN — CLONAZEPAM 0.5 MG: 0.5 TABLET ORAL at 09:42

## 2019-10-11 RX ADMIN — INSULIN ASPART 2 UNITS: 100 INJECTION, SOLUTION INTRAVENOUS; SUBCUTANEOUS at 18:05

## 2019-10-11 RX ADMIN — CEFTRIAXONE SODIUM 1 G: 1 INJECTION, POWDER, FOR SOLUTION INTRAMUSCULAR; INTRAVENOUS at 20:21

## 2019-10-11 RX ADMIN — HEPARIN SODIUM 5000 UNITS: 5000 INJECTION INTRAVENOUS; SUBCUTANEOUS at 09:42

## 2019-10-11 RX ADMIN — CETIRIZINE HYDROCHLORIDE 5 MG: 5 TABLET ORAL at 09:42

## 2019-10-11 RX ADMIN — ACETAMINOPHEN 650 MG: 325 TABLET, FILM COATED ORAL at 13:58

## 2019-10-11 NOTE — THERAPY EVALUATION
Acute Care - Physical Therapy Initial Evaluation  [facility]     Patient Name: Melissa Parham  : 1945  MRN: [id number]  Today's Date: 10/11/2019   Onset of Illness/Injury or Date of Surgery: 10/10/19  Date of Referral to PT: 10/11/19  Referring Physician: MACHO Akhtar      Admit Date: 10/10/2019    Visit Dx:     ICD-10-CM ICD-9-CM   1. Acute pyelonephritis N10 590.10   2. Impaired functional mobility and endurance Z74.09 V49.89     Patient Active Problem List   Diagnosis   • Acute pyelonephritis     Past Medical History:   Diagnosis Date   • Arthritis    • CHF (congestive heart failure) (CMS/[facility])    • Deaf    • Diabetes mellitus (CMS/[facility])    • Elevated cholesterol    • Hypertension    • Nonverbal      History reviewed. No pertinent surgical history.     PT ASSESSMENT (last 12 hours)      Physical Therapy Evaluation     Row Name 10/11/19 1500          PT Evaluation Time/Intention    Subjective Information  complains of;pain abdominal  -BS     Document Type  evaluation  -BS     Mode of Treatment  individual therapy;physical therapy  -BS     Patient Effort  fair  -BS     Symptoms Noted During/After Treatment  shortness of breath  -BS     Row Name 10/11/19 1500          General Information    Patient Profile Reviewed?  yes  -BS     Onset of Illness/Injury or Date of Surgery  10/10/19  -BS     Referring Physician  MACHO Akhtar  -BS     Patient Observations  alert;cooperative;agree to therapy  -BS     Patient/Family Observations  pt's 2 sisters present and at bedside  -BS     Prior Level of Function  independent:;ADL's;community mobility;gait;bathing;feeding;dressing;grooming  -BS     Equipment Currently Used at Home  none  -BS     Pertinent History of Current Functional Problem  75 yo female presenting with pyelonephritis, cough, fever and fatigue upon admission, atypical pneumonia.  -BS     Existing Precautions/Restrictions  other (see comments) deaf, non verbal  -BS     Equipment Issued  to Patient  gait belt  -BS     Row Name 10/11/19 1500          Relationship/Environment    Primary Source of Support/Comfort  sibling(s)  -BS     Lives With  alone  -BS     Row Name 10/11/19 1500          Resource/Environmental Concerns    Current Living Arrangements  extended care facility  -BS     Row Name 10/11/19 1500          Living Environment    Living Arrangements  extended care facility  -BS     Home Accessibility  wheelchair accessible  -BS     Row Name 10/11/19 1500          Cognitive Assessment/Intervention- PT/OT    Orientation Status (Cognition)  unable/difficult to assess deaf, non-verbal  -BS     Follows Commands (Cognition)  follows one step commands;50-74% accuracy;physical/tactile prompts required;repetition of directions required  -BS     Safety Deficit (Cognitive)  impulsivity  -BS     Row Name 10/11/19 1500          Safety Issues, Functional Mobility    Safety Issues Affecting Function (Mobility)  impulsivity  -BS     Row Name 10/11/19 1500          Bed Mobility Assessment/Treatment    Bed Mobility Assessment/Treatment  supine-sit;sit-supine  -BS     Supine-Sit Barrow (Bed Mobility)  contact guard tactile cues  -BS     Sit-Supine B[place] (Bed Mobility)  contact guard tactile cues motioning pt to lay back down  -BS     Row Name 10/11/19 1500          Transfer Assessment/Treatment    Transfer Assessment/Treatment  sit-stand transfer;stand-sit transfer  -BS     Sit-Stand Barrow (Transfers)  contact guard  -BS     Stand-Sit Barrow (Transfers)  contact guard  -BS     Row Name 10/11/19 1500          Sit-Stand Transfer    Assistive Device (Sit-Stand Transfers)  other (see comments) no AD  -BS     Row Name 10/11/19 1500          Stand-Sit Transfer    Assistive Device (Stand-Sit Transfers)  other (see comments) no AD  -BS     Row Name 10/11/19 1500          Gait/Stairs Assessment/Training    Barrow Level (Gait)  contact guard  -BS     Assistive Device (Gait)  other (see  comments) no AD  -BS     Distance in Feet (Gait)  80' x 1  -BS     Pattern (Gait)  step-through  -BS     Comment (Gait/Stairs)  no LOB episodes  -BS     Row Name 10/11/19 1500          General ROM    GENERAL ROM COMMENTS  B LE's grossly WFL for AROM  -BS     Row Name 10/11/19 1500          MMT (Manual Muscle Testing)    General MMT Comments  grossly 3+/5 B LE's  -BS     Row Name 10/11/19 1500          Sensory Assessment/Intervention    Sensory General Assessment  other (see comments) NT due to deaf, non-verbal  -BS     Row Name 10/11/19 1500          Vision Assessment/Intervention    Visual Impairment/Limitations  corrective lenses full time  -BS     Row Name 10/11/19 1500          Pain Assessment    Additional Documentation  Pain Scale: Numbers Pre/Post-Treatment (Group)  -BS     Row Name 10/11/19 1500          Pain Scale: Numbers Pre/Post-Treatment    Pain Scale: Numbers, Pretreatment  -- unable to rate-deaf  -BS     Pain Scale: Numbers, Post-Treatment  -- unable to rate  -BS     Pain Location  abdomen  -BS     Pain Intervention(s)  Repositioned  -BS     Row Name 10/11/19 1500          Plan of Care Review    Plan of Care Reviewed With  patient;sibling  -BS     Row Name 10/11/19 1500          Physical Therapy Clinical Impression    Date of Referral to PT  10/11/19  -BS     PT Diagnosis (PT Clinical Impression)  impaired mobility and function  -BS     Patient/Family Goals Statement (PT Clinical Impression)  return back to SNF  -BS     Criteria for Skilled Interventions Met (PT Clinical Impression)  yes;treatment indicated  -BS     Pathology/Pathophysiology Noted (Describe Specifically for Each System)  musculoskeletal  -BS     Impairments Found (describe specific impairments)  aerobic capacity/endurance;gait, locomotion, and balance  -BS     Rehab Potential (PT Clinical Summary)  good, to achieve stated therapy goals  -BS     Predicted Duration of Therapy (PT)  until goals are met  -BS     Row Name 10/11/19 1500           Vital Signs    Pre Systolic BP Rehab  113  -BS     Pre Treatment Diastolic BP  65  -BS     Pretreatment Heart Rate (beats/min)  56  -BS     Intratreatment Heart Rate (beats/min)  69  -BS     Pre SpO2 (%)  92  -BS     O2 Delivery Pre Treatment  room air  -BS     Intra SpO2 (%)  92  -BS     O2 Delivery Intra Treatment  room air  -BS     Pre Patient Position  Supine  -BS     Intra Patient Position  Sitting  -BS     Post Patient Position  Supine  -BS     Row Name 10/11/19 1500          Physical Therapy Goals    Bed Mobility Goal Selection (PT)  bed mobility, PT goal 1  -BS     Transfer Goal Selection (PT)  transfer, PT goal 1  -BS     Gait Training Goal Selection (PT)  gait training, PT goal 1  -BS     Row Name 10/11/19 1500          Bed Mobility Goal 1 (PT)    Activity/Assistive Device (Bed Mobility Goal 1, PT)  sit to supine/supine to sit  -BS     Anson Level/Cues Needed (Bed Mobility Goal 1, PT)  conditional independence  -BS     Time Frame (Bed Mobility Goal 1, PT)  by discharge  -BS     Barriers (Bed Mobility Goal 1, PT)  deaf, nonverbal  -BS     Progress/Outcomes (Bed Mobility Goal 1, PT)  goal not met  -BS     Row Name 10/11/19 1500          Transfer Goal 1 (PT)    Activity/Assistive Device (Transfer Goal 1, PT)  sit-to-stand/stand-to-sit  -BS     Anson Level/Cues Needed (Transfer Goal 1, PT)  standby assist  -BS     Time Frame (Transfer Goal 1, PT)  by discharge  -BS     Barriers (Transfers Goal 1, PT)  deaf, nonverbal  -BS     Progress/Outcome (Transfer Goal 1, PT)  goal not met  -BS     Row Name 10/11/19 1500          Gait Training Goal 1 (PT)    Activity/Assistive Device (Gait Training Goal 1, PT)  gait (walking locomotion);other (see comments) no AD  -BS     Anson Level (Gait Training Goal 1, PT)  supervision required  -BS     Distance (Gait Goal 1, PT)  250' x 2  -BS     Time Frame (Gait Training Goal 1, PT)  by discharge  -BS     Barriers (Gait Training Goal 1, PT)  deaf,  nonverbal  -BS     Progress/Outcome (Gait Training Goal 1, PT)  goal not met  -BS     Row Name 10/11/19 1500          Positioning and Restraints    Pre-Treatment Position  in bed  -BS     Post Treatment Position  bed  -BS     In Bed  supine;exit alarm on;with family/caregiver  -BS       User Key  (r) = Recorded By, (t) = Taken By, (c) = Cosigned By    Initials Name Provider Type    BS Julio Yee, PT Physical Therapist          PT Recommendation and Plan  Anticipated Discharge Disposition (PT): anticipate therapy at next level of care  Planned Therapy Interventions (PT Eval): balance training, gait training, neuromuscular re-education, patient/family education, transfer training  Therapy Frequency (PT Clinical Impression): daily  Outcome Summary/Treatment Plan (PT)  Anticipated Discharge Disposition (PT): anticipate therapy at next level of care  Plan of Care Reviewed With: patient, sibling  Progress: no change  Outcome Summary: PT evaluation completed. Pt required CGA with tactile cues and gestures with going supine <>sit, CGA sit to stand with no AD, CGA x 80' with ambulation without an AD, no LOB episodes. Limited by generalized weakness and communication/language barrier (pt is deaf and nonverbal). Anticipate short rehab stay to improve strengthening to facilitate return back to her prior living situation (SNF).   Outcome Measures     Row Name 10/11/19 1513             How much help from another person do you currently need...    Turning from your back to your side while in flat bed without using bedrails?  3  -BS      Moving from lying on back to sitting on the side of a flat bed without bedrails?  3  -BS      Moving to and from a bed to a chair (including a wheelchair)?  3  -BS      Standing up from a chair using your arms (e.g., wheelchair, bedside chair)?  3  -BS      Climbing 3-5 steps with a railing?  3  -BS      To walk in hospital room?  3  -BS      AM-PAC 6 Clicks Score (PT)  18  -BS          Functional Assessment    Outcome Measure Options  AM-PAC 6 Clicks Basic Mobility (PT)  -BS        User Key  (r) = Recorded By, (t) = Taken By, (c) = Cosigned By    Initials Name Provider Type    Julio Montoya, PT Physical Therapist         Time Calculation:   PT Charges     Row Name 10/11/19 1613             Time Calculation    Start Time  1513  -BS      Stop Time  1548  -BS      Time Calculation (min)  35 min  -BS      PT Received On  10/11/19  -BS      PT Goal Re-Cert Due Date  10/24/19  -BS        User Key  (r) = Recorded By, (t) = Taken By, (c) = Cosigned By    Initials Name Provider Type    Julio Montoya, PT Physical Therapist        Therapy Charges for Today     Code Description Service Date Service Provider Modifiers Qty    98347576706 HC PT EVAL MOD COMPLEXITY 2 10/11/2019 Julio Yee, PT GP 1          PT G-Codes  Outcome Measure Options: AM-PAC 6 Clicks Basic Mobility (PT)  AM-PAC 6 Clicks Score (PT): 18      Julio Yee PT  10/11/2019

## 2019-10-11 NOTE — PROGRESS NOTES
[facility] Medicine Services  INPATIENT PROGRESS NOTE    Length of Stay: 0  Date of Admission: 10/10/2019  Primary Care Physician: Austyn Pelayo MD    Subjective   Chief Complaint: No complaints    HPI:      10/11/2019: Patient is alert on examination this a.m.  The patient is able to communicate with her family through minimal sign language.    H&P:  This is a 74 year old  female with PMH of CAD, anxiety, DM II, HTN and HLD that presented from a SNF secondary to fever, chills and fatigue.  The patient is deaf and unable to give any medical history.  Patient's sister is at bedside and states the patient had some issues with constipation yesterday and was given fleets enemas yesterday.  She became lethargic and spiked a fever this am.  CT of the abdomen and pelvis shows perinephric left-sided inflammatory changes around the left kidney consistent with pyelonephritis.      Review of Systems   Unable to perform ROS: Patient nonverbal        Objective    Temp:  [97.8 °F (36.6 °C)-104.7 °F (40.4 °C)] 99.8 °F (37.7 °C)  Heart Rate:  [] 56  Resp:  [16-20] 16  BP: (100-174)/(58-74) 113/65    Physical Exam   Constitutional: She is oriented to person, place, and time. She appears well-developed and well-nourished.   HENT:   Head: Normocephalic and atraumatic.   Eyes: EOM are normal. Pupils are equal, round, and reactive to light.   Neck: Normal range of motion. Neck supple.   Cardiovascular: Normal rate and regular rhythm.   Pulmonary/Chest: Effort normal and breath sounds normal.   Abdominal: Soft. Bowel sounds are normal. There is tenderness.   Musculoskeletal: Normal range of motion.   Neurological: She is alert and oriented to person, place, and time.   Skin: Skin is warm and dry.   Psychiatric: She has a normal mood and affect. Her behavior is normal.   The patient is deaf    Results Review:  I have reviewed the labs, radiology results, and diagnostic  studies.    Laboratory Data:   Results from last 7 days   Lab Units 10/11/19  0916 10/10/19  1517   SODIUM mmol/L 135* 136   POTASSIUM mmol/L 3.8 3.8   CHLORIDE mmol/L 99 99   CO2 mmol/L 26.0 25.0   BUN mg/dL 34* 25*   CREATININE mg/dL 2.01* 1.21*   GLUCOSE mg/dL 268* 136*   CALCIUM mg/dL 8.4* 8.8   BILIRUBIN mg/dL 0.3 0.4   ALK PHOS U/L 79 81   ALT (SGPT) U/L 30 11   AST (SGOT) U/L 145* 14   ANION GAP mmol/L 10.0 12.0     Estimated Creatinine Clearance: 21.4 mL/min (A) (by C-G formula based on SCr of 2.01 mg/dL (H)).          Results from last 7 days   Lab Units 10/11/19  0916 10/10/19  1517   WBC 10*3/mm3 11.67* 9.40   HEMOGLOBIN g/dL 10.4* 10.8*   HEMATOCRIT % 32.3* 32.4*   PLATELETS 10*3/mm3 158 168           Culture Data:   No results found for: BLOODCX  Urine Culture   Date Value Ref Range Status   10/10/2019 >100,000 CFU/mL Escherichia coli (A)  Preliminary     No results found for: RESPCX  No results found for: WOUNDCX  No results found for: STOOLCX  No components found for: BODYFLD    Radiology Data:   Imaging Results (last 24 hours)     Procedure Component Value Units Date/Time    XR Chest PA & Lateral [[id number]] Collected:  10/10/19 2011     Updated:  10/10/19 2115    Narrative:         Chest 2 view on  10/10/2019     CLINICAL INDICATION: Cough, congestion    COMPARISON: None    FINDINGS: Heart is borderline in size. Mild increased  interstitial changes may be chronic in nature but cannot exclude  mild edema or atypical pneumonia. Hilar and mediastinal contours  are within normal limits. No bony abnormality is noted.      Impression:       Mild increased interstitial changes may be chronic in  nature versus mild edema or atypical pneumonia. Correlation with  an old exam or short-term follow-up will be useful.    Electronically signed by:  Wenceslao Toussaint  10/10/2019 9:14 PM  CDT Workstation: [id number]    CT Abdomen Pelvis With Contrast [[id number]] Collected:  10/10/19 1601     Updated:  10/10/19 0045     Narrative:       CT abdomen, pelvis with contrast.       CLINICAL INDICATION: Abdominal pain, vomiting.       COMPARISON: CT July 10, 2015.       TECHNIQUE: 80 mL of Visipaque 320  nonionic IV contrast. Helical  scanning with axial and coronal reformations Soft tissue, lung,  and bone windows reviewed.    This exam was performed according to our departmental  dose-optimization program, which includes automated exposure  control, adjustment of the mA and/or kV according to patient size  and/or use of iterative reconstruction technique.    ABDOMEN CT FINDINGS: The visualized lung bases show minor  dependent changes. Liver, spleen, pancreas, adrenal glands and  right kidney are are normal in appearance. There are perinephric  inflammatory changes (stranding) surrounding the left kidney  without evidence of hydronephrosis. The above findings may  indicate inflammation, infection, early changes of  pyelonephritis. Left kidney is otherwise unremarkable. There are  several small retroperitoneal lymph nodes adjacent to the left  periaortic region similar to prior exam.    Bowel grossly unremarkable.    There is a tiny calcification within the gallbladder wall  possibly cholesterol polyp. This however is unchanged since prior  CT examination July 10, 2015 and therefore is of incidental  significance.    No evidence of pathologically enlarged nodes, free air, or free  fluid. Degenerative changes of the lumbar spine.  The bones are otherwise unremarkable.    PELVIS CT FINDINGS: There is a 2.85 cm calcified subserosal  fibroid arising from the right side of the uterus. The pelvis  otherwise unremarkable.. No evidence of pathologically enlarged  nodes, free air, or free fluid.     The bones are grossly unremarkable.      Impression:       CONCLUSION: There are perinephric left-sided inflammatory changes  surrounding the left kidney more pronounced than on prior  examination. This may suggest inflammation, infection i.e.  early  changes of pyelonephritis . No hydronephrotic changes.    2.5 cm calcified subserosal fibroid arising from the uterine  fundus. Pelvis otherwise unremarkable.    CT abdomen, pelvis with contrast is otherwise unremarkable.      Electronically signed by:  Carlos Greenwood MD  10/10/2019 4:44 PM CDT  Workstation: [id number]          I have reviewed the patient's current medications.     Assessment/Plan     Active Hospital Problems    Diagnosis POA   • Acute pyelonephritis [N10] Yes     Plan:    1.  Acute pyelonephritis:  IVF and IV Rocephin.  E. Coli noted on urine culture.  Tylenol for fever.   2.  Hypertension/ CAD: Continue home Plavix, HCTZ and Metoprolol.   3.  Anxiety:  Continue home Clonazepam and Risperadone.   4.  Diabetes mellitus, type II:  SSI.  Patient takes 50 units long-acting q am at SNF. Appetite has increased.  Will increase to 15 units q am. Hold Metformin.    5.  GERD:  Continue home Protonix.   6.  Hyperlipidemia:  Continue home statin.   7.  Acute kidney injury:  IVF.  Hold HCTZ and Zyrtec.  Claros secondary to urine retention.      8.  Pneumonia:  Respiratory panel, culture, strep pne, mycoplasma, and legionella pending. Continue Rocephin.  Will start Azithromycin.         Discharge Planning: I expect patient to be discharged to SNF in 2-3 days.      This document has been electronically signed by MACHO Posada on October 11, 2019 3:26 PM

## 2019-10-11 NOTE — PLAN OF CARE
Problem: Patient Care Overview  Goal: Plan of Care Review  Outcome: Ongoing (interventions implemented as appropriate)   10/11/19 6592   Coping/Psychosocial   Plan of Care Reviewed With patient;sibling   Plan of Care Review   Progress no change   OTHER   Outcome Summary PT evaluation completed. Pt required CGA with tactile cues and gestures with going supine <>sit, CGA sit to stand with no AD, CGA x 80' with ambulation without an AD, no LOB episodes. Limited by generalized weakness and communication/language barrier (pt is deaf and nonverbal). Anticipate short rehab stay to improve strengthening to facilitate return back to her prior living situation (SNF).

## 2019-10-11 NOTE — PLAN OF CARE
Problem: Patient Care Overview  Goal: Plan of Care Review  Outcome: Ongoing (interventions implemented as appropriate)    Goal: Individualization and Mutuality  Outcome: Ongoing (interventions implemented as appropriate)      Problem: Sepsis/Septic Shock (Adult)  Goal: Signs and Symptoms of Listed Potential Problems Will be Absent, Minimized or Managed (Sepsis/Septic Shock)  Outcome: Ongoing (interventions implemented as appropriate)      Problem: Fall Risk (Adult)  Goal: Identify Related Risk Factors and Signs and Symptoms  Outcome: Ongoing (interventions implemented as appropriate)      Problem: Skin Injury Risk (Adult)  Goal: Identify Related Risk Factors and Signs and Symptoms  Outcome: Ongoing (interventions implemented as appropriate)      Problem: Hearing Impairment/Deaf (Adult,Obstetrics,Pediatric)  Goal: Identify Related Risk Factors and Signs and Symptoms  Outcome: Ongoing (interventions implemented as appropriate)

## 2019-10-12 LAB
ALBUMIN SERPL-MCNC: 2.6 G/DL (ref 3.5–5.2)
ALBUMIN/GLOB SERPL: 0.7 G/DL
ALP SERPL-CCNC: 75 U/L (ref 39–117)
ALT SERPL W P-5'-P-CCNC: 39 U/L (ref 1–33)
ANION GAP SERPL CALCULATED.3IONS-SCNC: 9 MMOL/L (ref 5–15)
AST SERPL-CCNC: 162 U/L (ref 1–32)
BACTERIA SPEC AEROBE CULT: ABNORMAL
BASOPHILS # BLD AUTO: 0.02 10*3/MM3 (ref 0–0.2)
BASOPHILS NFR BLD AUTO: 0.2 % (ref 0–1.5)
BILIRUB SERPL-MCNC: <0.2 MG/DL (ref 0.2–1.2)
BUN BLD-MCNC: 40 MG/DL (ref 8–23)
BUN/CREAT SERPL: 23 (ref 7–25)
CALCIUM SPEC-SCNC: 8.2 MG/DL (ref 8.6–10.5)
CHLORIDE SERPL-SCNC: 103 MMOL/L (ref 98–107)
CO2 SERPL-SCNC: 24 MMOL/L (ref 22–29)
CREAT BLD-MCNC: 1.74 MG/DL (ref 0.57–1)
DEPRECATED RDW RBC AUTO: 40.3 FL (ref 37–54)
EOSINOPHIL # BLD AUTO: 0.03 10*3/MM3 (ref 0–0.4)
EOSINOPHIL NFR BLD AUTO: 0.3 % (ref 0.3–6.2)
ERYTHROCYTE [DISTWIDTH] IN BLOOD BY AUTOMATED COUNT: 13.4 % (ref 12.3–15.4)
GFR SERPL CREATININE-BSD FRML MDRD: 29 ML/MIN/1.73
GLOBULIN UR ELPH-MCNC: 3.7 GM/DL
GLUCOSE BLD-MCNC: 119 MG/DL (ref 65–99)
GLUCOSE BLDC GLUCOMTR-MCNC: 118 MG/DL (ref 70–130)
GLUCOSE BLDC GLUCOMTR-MCNC: 119 MG/DL (ref 70–130)
GLUCOSE BLDC GLUCOMTR-MCNC: 182 MG/DL (ref 70–130)
GLUCOSE BLDC GLUCOMTR-MCNC: 219 MG/DL (ref 70–130)
GLUCOSE BLDC GLUCOMTR-MCNC: 245 MG/DL (ref 70–130)
HCT VFR BLD AUTO: 29.9 % (ref 34–46.6)
HGB BLD-MCNC: 9.8 G/DL (ref 12–15.9)
IMM GRANULOCYTES # BLD AUTO: 0.06 10*3/MM3 (ref 0–0.05)
IMM GRANULOCYTES NFR BLD AUTO: 0.6 % (ref 0–0.5)
LYMPHOCYTES # BLD AUTO: 0.98 10*3/MM3 (ref 0.7–3.1)
LYMPHOCYTES NFR BLD AUTO: 10.1 % (ref 19.6–45.3)
MCH RBC QN AUTO: 27 PG (ref 26.6–33)
MCHC RBC AUTO-ENTMCNC: 32.8 G/DL (ref 31.5–35.7)
MCV RBC AUTO: 82.4 FL (ref 79–97)
MONOCYTES # BLD AUTO: 1.11 10*3/MM3 (ref 0.1–0.9)
MONOCYTES NFR BLD AUTO: 11.5 % (ref 5–12)
NEUTROPHILS # BLD AUTO: 7.48 10*3/MM3 (ref 1.7–7)
NEUTROPHILS NFR BLD AUTO: 77.3 % (ref 42.7–76)
NRBC BLD AUTO-RTO: 0 /100 WBC (ref 0–0.2)
PLATELET # BLD AUTO: 154 10*3/MM3 (ref 140–450)
PMV BLD AUTO: 10 FL (ref 6–12)
POTASSIUM BLD-SCNC: 3.9 MMOL/L (ref 3.5–5.2)
PROT SERPL-MCNC: 6.3 G/DL (ref 6–8.5)
RBC # BLD AUTO: 3.63 10*6/MM3 (ref 3.77–5.28)
SODIUM BLD-SCNC: 136 MMOL/L (ref 136–145)
WBC NRBC COR # BLD: 9.68 10*3/MM3 (ref 3.4–10.8)

## 2019-10-12 PROCEDURE — 25010000002 HEPARIN (PORCINE) PER 1000 UNITS: Performed by: NURSE PRACTITIONER

## 2019-10-12 PROCEDURE — 94760 N-INVAS EAR/PLS OXIMETRY 1: CPT

## 2019-10-12 PROCEDURE — 87040 BLOOD CULTURE FOR BACTERIA: CPT | Performed by: NURSE PRACTITIONER

## 2019-10-12 PROCEDURE — 25010000002 CEFTRIAXONE PER 250 MG: Performed by: NURSE PRACTITIONER

## 2019-10-12 PROCEDURE — 82962 GLUCOSE BLOOD TEST: CPT

## 2019-10-12 PROCEDURE — 80053 COMPREHEN METABOLIC PANEL: CPT | Performed by: NURSE PRACTITIONER

## 2019-10-12 PROCEDURE — 25010000002 CEFEPIME PER 500 MG: Performed by: NURSE PRACTITIONER

## 2019-10-12 PROCEDURE — 25010000002 VANCOMYCIN 5 G RECONSTITUTED SOLUTION: Performed by: NURSE PRACTITIONER

## 2019-10-12 PROCEDURE — 63710000001 INSULIN ASPART PER 5 UNITS: Performed by: NURSE PRACTITIONER

## 2019-10-12 PROCEDURE — 85025 COMPLETE CBC W/AUTO DIFF WBC: CPT | Performed by: NURSE PRACTITIONER

## 2019-10-12 PROCEDURE — 25010000002 AZITHROMYCIN PER 500 MG: Performed by: NURSE PRACTITIONER

## 2019-10-12 RX ADMIN — VANCOMYCIN HYDROCHLORIDE 1250 MG: 500 INJECTION, POWDER, LYOPHILIZED, FOR SOLUTION INTRAVENOUS at 22:16

## 2019-10-12 RX ADMIN — ATORVASTATIN CALCIUM 10 MG: 10 TABLET, FILM COATED ORAL at 20:27

## 2019-10-12 RX ADMIN — AZITHROMYCIN MONOHYDRATE 500 MG: 500 INJECTION, POWDER, LYOPHILIZED, FOR SOLUTION INTRAVENOUS at 15:54

## 2019-10-12 RX ADMIN — ACETAMINOPHEN 650 MG: 325 TABLET, FILM COATED ORAL at 11:46

## 2019-10-12 RX ADMIN — CLONAZEPAM 0.5 MG: 0.5 TABLET ORAL at 20:28

## 2019-10-12 RX ADMIN — PANTOPRAZOLE SODIUM 40 MG: 40 TABLET, DELAYED RELEASE ORAL at 06:41

## 2019-10-12 RX ADMIN — CLONAZEPAM 0.5 MG: 0.5 TABLET ORAL at 09:16

## 2019-10-12 RX ADMIN — METOPROLOL TARTRATE 50 MG: 50 TABLET ORAL at 09:16

## 2019-10-12 RX ADMIN — CEFTRIAXONE SODIUM 1 G: 1 INJECTION, POWDER, FOR SOLUTION INTRAMUSCULAR; INTRAVENOUS at 17:43

## 2019-10-12 RX ADMIN — ACETAMINOPHEN 650 MG: 325 TABLET, FILM COATED ORAL at 17:46

## 2019-10-12 RX ADMIN — SODIUM CHLORIDE, PRESERVATIVE FREE 10 ML: 5 INJECTION INTRAVENOUS at 20:32

## 2019-10-12 RX ADMIN — HEPARIN SODIUM 5000 UNITS: 5000 INJECTION INTRAVENOUS; SUBCUTANEOUS at 09:16

## 2019-10-12 RX ADMIN — INSULIN ASPART 4 UNITS: 100 INJECTION, SOLUTION INTRAVENOUS; SUBCUTANEOUS at 11:17

## 2019-10-12 RX ADMIN — CEFEPIME HYDROCHLORIDE 2 G: 2 INJECTION, POWDER, FOR SOLUTION INTRAVENOUS at 20:30

## 2019-10-12 RX ADMIN — HYDROCODONE BITARTRATE AND ACETAMINOPHEN 1 TABLET: 7.5; 325 TABLET ORAL at 03:01

## 2019-10-12 RX ADMIN — SODIUM CHLORIDE 75 ML/HR: 9 INJECTION, SOLUTION INTRAVENOUS at 11:17

## 2019-10-12 RX ADMIN — CLOPIDOGREL BISULFATE 75 MG: 75 TABLET ORAL at 09:16

## 2019-10-12 RX ADMIN — HEPARIN SODIUM 5000 UNITS: 5000 INJECTION INTRAVENOUS; SUBCUTANEOUS at 20:34

## 2019-10-12 RX ADMIN — INSULIN ASPART 4 UNITS: 100 INJECTION, SOLUTION INTRAVENOUS; SUBCUTANEOUS at 20:34

## 2019-10-12 RX ADMIN — HYDROCODONE BITARTRATE AND ACETAMINOPHEN 1 TABLET: 7.5; 325 TABLET ORAL at 18:36

## 2019-10-12 RX ADMIN — RISPERIDONE 0.25 MG: 0.25 TABLET ORAL at 09:16

## 2019-10-12 NOTE — PLAN OF CARE
Problem: Patient Care Overview  Goal: Plan of Care Review  Outcome: Ongoing (interventions implemented as appropriate)   10/12/19 0154   Coping/Psychosocial   Plan of Care Reviewed With patient   Plan of Care Review   Progress no change   OTHER   Outcome Summary Pt resting. no acute events overnight. v/s stable. will continue to monitor pt      Goal: Individualization and Mutuality  Outcome: Ongoing (interventions implemented as appropriate)    Goal: Discharge Needs Assessment  Outcome: Ongoing (interventions implemented as appropriate)    Goal: Interprofessional Rounds/Family Conf  Outcome: Ongoing (interventions implemented as appropriate)      Problem: Sepsis/Septic Shock (Adult)  Goal: Signs and Symptoms of Listed Potential Problems Will be Absent, Minimized or Managed (Sepsis/Septic Shock)  Outcome: Ongoing (interventions implemented as appropriate)      Problem: Fall Risk (Adult)  Goal: Identify Related Risk Factors and Signs and Symptoms  Outcome: Outcome(s) achieved Date Met: 10/12/19    Goal: Absence of Fall  Outcome: Ongoing (interventions implemented as appropriate)      Problem: Skin Injury Risk (Adult)  Goal: Identify Related Risk Factors and Signs and Symptoms  Outcome: Outcome(s) achieved Date Met: 10/12/19    Goal: Skin Health and Integrity  Outcome: Ongoing (interventions implemented as appropriate)      Problem: Hearing Impairment/Deaf (Adult,Obstetrics,Pediatric)  Goal: Identify Related Risk Factors and Signs and Symptoms  Outcome: Outcome(s) achieved Date Met: 10/12/19    Goal: Enhanced Communication  Outcome: Ongoing (interventions implemented as appropriate)      Problem: Urinary Tract Infection (Adult)  Goal: Signs and Symptoms of Listed Potential Problems Will be Absent, Minimized or Managed (Urinary Tract Infection)  Outcome: Ongoing (interventions implemented as appropriate)

## 2019-10-12 NOTE — PROGRESS NOTES
[facility] Medicine Services  INPATIENT PROGRESS NOTE    Length of Stay: 0  Date of Admission: 10/10/2019  Primary Care Physician: Austyn Pelayo MD    Subjective   Chief Complaint: No complaints    HPI:      10/12/2019: Claros catheter was placed yesterday secondary to elevated creatinine.  Creatinine has decreased from 2 yesterday to 1.74 today.      10/11/2019: Patient is alert on examination this a.m.  The patient is able to communicate with her family through minimal sign language.    H&P:  This is a 74 year old  female with PMH of CAD, anxiety, DM II, HTN and HLD that presented from a SNF secondary to fever, chills and fatigue.  The patient is deaf and unable to give any medical history.  Patient's sister is at bedside and states the patient had some issues with constipation yesterday and was given fleets enemas yesterday.  She became lethargic and spiked a fever this am.  CT of the abdomen and pelvis shows perinephric left-sided inflammatory changes around the left kidney consistent with pyelonephritis.      Review of Systems   Unable to perform ROS: Patient nonverbal        Objective    Temp:  [96.5 °F (35.8 °C)-101.9 °F (38.8 °C)] 99.5 °F (37.5 °C)  Heart Rate:  [56-87] 69  Resp:  [16-18] 16  BP: (110-124)/(60-79) 110/66    Physical Exam   Constitutional: She is oriented to person, place, and time. She appears well-developed and well-nourished.   HENT:   Head: Normocephalic and atraumatic.   Eyes: EOM are normal. Pupils are equal, round, and reactive to light.   Neck: Normal range of motion. Neck supple.   Cardiovascular: Normal rate and regular rhythm.   Pulmonary/Chest: Effort normal and breath sounds normal.   Abdominal: Soft. Bowel sounds are normal. There is tenderness.   Musculoskeletal: Normal range of motion.   Neurological: She is alert and oriented to person, place, and time.   Skin: Skin is warm and dry.   Psychiatric: She has a normal mood and  affect. Her behavior is normal.   The patient is deaf    Results Review:  I have reviewed the labs, radiology results, and diagnostic studies.    Laboratory Data:   Results from last 7 days   Lab Units 10/12/19  0747 10/11/19  0916 10/10/19  1517   SODIUM mmol/L 136 135* 136   POTASSIUM mmol/L 3.9 3.8 3.8   CHLORIDE mmol/L 103 99 99   CO2 mmol/L 24.0 26.0 25.0   BUN mg/dL 40* 34* 25*   CREATININE mg/dL 1.74* 2.01* 1.21*   GLUCOSE mg/dL 119* 268* 136*   CALCIUM mg/dL 8.2* 8.4* 8.8   BILIRUBIN mg/dL <0.2* 0.3 0.4   ALK PHOS U/L 75 79 81   ALT (SGPT) U/L 39* 30 11   AST (SGOT) U/L 162* 145* 14   ANION GAP mmol/L 9.0 10.0 12.0     Estimated Creatinine Clearance: 25.2 mL/min (A) (by C-G formula based on SCr of 1.74 mg/dL (H)).          Results from last 7 days   Lab Units 10/12/19  0747 10/11/19  0916 10/10/19  1517   WBC 10*3/mm3 9.68 11.67* 9.40   HEMOGLOBIN g/dL 9.8* 10.4* 10.8*   HEMATOCRIT % 29.9* 32.3* 32.4*   PLATELETS 10*3/mm3 154 158 168           Culture Data:   No results found for: BLOODCX  Urine Culture   Date Value Ref Range Status   10/10/2019 >100,000 CFU/mL Escherichia coli (A)  Final     No results found for: RESPCX  No results found for: WOUNDCX  No results found for: STOOLCX  No components found for: BODYFLD    Radiology Data:   Imaging Results (last 24 hours)     ** No results found for the last 24 hours. **          I have reviewed the patient's current medications.     Assessment/Plan     Active Hospital Problems    Diagnosis POA   • Acute pyelonephritis [N10] Yes     Plan:    1.  Acute pyelonephritis:  IVF and IV Rocephin.  E. Coli noted on urine culture.  Tylenol for fever.   2.  Hypertension/ CAD: Continue home Plavix, HCTZ and Metoprolol.   3.  Anxiety:  Continue home Clonazepam and Risperadone.   4.  Diabetes mellitus, type II:  SSI.  Patient takes 50 units long-acting q am at SNF. Appetite has increased.  Will increase to 15 units q am. Hold Metformin.    5.  GERD:  Continue home Protonix.    6.  Hyperlipidemia:  Continue home statin.   7.  Acute kidney injury, improved:  IVF.  Hold HCTZ and Zyrtec.  Claros secondary to urine retention.      8.  Pneumonia:  Respiratory panel, culture, strep pne, mycoplasma, and legionella negative. Continue Rocephin.  Will start Azithromycin.         Discharge Planning: I expect patient to be discharged to SNF in 2-3 days.      This document has been electronically signed by MACHO Posada on October 12, 2019 2:13 PM

## 2019-10-13 ENCOUNTER — APPOINTMENT (OUTPATIENT)
Dept: NUCLEAR MEDICINE | Facility: HOSPITAL | Age: 74
End: 2019-10-13

## 2019-10-13 ENCOUNTER — APPOINTMENT (OUTPATIENT)
Dept: GENERAL RADIOLOGY | Facility: HOSPITAL | Age: 74
End: 2019-10-13

## 2019-10-13 ENCOUNTER — APPOINTMENT (OUTPATIENT)
Dept: CARDIOLOGY | Facility: HOSPITAL | Age: 74
End: 2019-10-13

## 2019-10-13 LAB
ALBUMIN SERPL-MCNC: 2.8 G/DL (ref 3.5–5.2)
ALBUMIN/GLOB SERPL: 0.7 G/DL
ALP SERPL-CCNC: 106 U/L (ref 39–117)
ALT SERPL W P-5'-P-CCNC: 41 U/L (ref 1–33)
ANION GAP SERPL CALCULATED.3IONS-SCNC: 13 MMOL/L (ref 5–15)
AST SERPL-CCNC: 89 U/L (ref 1–32)
BASOPHILS # BLD AUTO: 0.02 10*3/MM3 (ref 0–0.2)
BASOPHILS NFR BLD AUTO: 0.3 % (ref 0–1.5)
BILIRUB SERPL-MCNC: 0.2 MG/DL (ref 0.2–1.2)
BUN BLD-MCNC: 33 MG/DL (ref 8–23)
BUN/CREAT SERPL: 23.9 (ref 7–25)
CALCIUM SPEC-SCNC: 8.5 MG/DL (ref 8.6–10.5)
CHLORIDE SERPL-SCNC: 102 MMOL/L (ref 98–107)
CO2 SERPL-SCNC: 23 MMOL/L (ref 22–29)
CREAT BLD-MCNC: 1.38 MG/DL (ref 0.57–1)
D-DIMER, QUANTITATIVE (MAD,POW, STR): >4000 NG/ML (FEU) (ref 0–470)
DEPRECATED RDW RBC AUTO: 41 FL (ref 37–54)
EOSINOPHIL # BLD AUTO: 0.09 10*3/MM3 (ref 0–0.4)
EOSINOPHIL NFR BLD AUTO: 1.1 % (ref 0.3–6.2)
ERYTHROCYTE [DISTWIDTH] IN BLOOD BY AUTOMATED COUNT: 13.5 % (ref 12.3–15.4)
GFR SERPL CREATININE-BSD FRML MDRD: 37 ML/MIN/1.73
GLOBULIN UR ELPH-MCNC: 3.8 GM/DL
GLUCOSE BLD-MCNC: 132 MG/DL (ref 65–99)
GLUCOSE BLDC GLUCOMTR-MCNC: 172 MG/DL (ref 70–130)
GLUCOSE BLDC GLUCOMTR-MCNC: 199 MG/DL (ref 70–130)
GLUCOSE BLDC GLUCOMTR-MCNC: 200 MG/DL (ref 70–130)
GLUCOSE BLDC GLUCOMTR-MCNC: 209 MG/DL (ref 70–130)
HCT VFR BLD AUTO: 29.8 % (ref 34–46.6)
HGB BLD-MCNC: 9.8 G/DL (ref 12–15.9)
IMM GRANULOCYTES # BLD AUTO: 0.05 10*3/MM3 (ref 0–0.05)
IMM GRANULOCYTES NFR BLD AUTO: 0.6 % (ref 0–0.5)
LYMPHOCYTES # BLD AUTO: 0.76 10*3/MM3 (ref 0.7–3.1)
LYMPHOCYTES NFR BLD AUTO: 9.7 % (ref 19.6–45.3)
MCH RBC QN AUTO: 27.2 PG (ref 26.6–33)
MCHC RBC AUTO-ENTMCNC: 32.9 G/DL (ref 31.5–35.7)
MCV RBC AUTO: 82.8 FL (ref 79–97)
MONOCYTES # BLD AUTO: 0.95 10*3/MM3 (ref 0.1–0.9)
MONOCYTES NFR BLD AUTO: 12.1 % (ref 5–12)
MRSA DNA SPEC QL NAA+PROBE: NEGATIVE
NEUTROPHILS # BLD AUTO: 5.98 10*3/MM3 (ref 1.7–7)
NEUTROPHILS NFR BLD AUTO: 76.2 % (ref 42.7–76)
NRBC BLD AUTO-RTO: 0 /100 WBC (ref 0–0.2)
NT-PROBNP SERPL-MCNC: 9784 PG/ML (ref 5–900)
PLATELET # BLD AUTO: 173 10*3/MM3 (ref 140–450)
PMV BLD AUTO: 10.1 FL (ref 6–12)
POTASSIUM BLD-SCNC: 3.7 MMOL/L (ref 3.5–5.2)
PROT SERPL-MCNC: 6.6 G/DL (ref 6–8.5)
RBC # BLD AUTO: 3.6 10*6/MM3 (ref 3.77–5.28)
SODIUM BLD-SCNC: 138 MMOL/L (ref 136–145)
VANCOMYCIN SERPL-MCNC: 9.5 MCG/ML (ref 5–40)
WBC NRBC COR # BLD: 7.85 10*3/MM3 (ref 3.4–10.8)

## 2019-10-13 PROCEDURE — 82962 GLUCOSE BLOOD TEST: CPT

## 2019-10-13 PROCEDURE — 87641 MR-STAPH DNA AMP PROBE: CPT | Performed by: NURSE PRACTITIONER

## 2019-10-13 PROCEDURE — A9540 TC99M MAA: HCPCS | Performed by: INTERNAL MEDICINE

## 2019-10-13 PROCEDURE — 93306 TTE W/DOPPLER COMPLETE: CPT

## 2019-10-13 PROCEDURE — 83880 ASSAY OF NATRIURETIC PEPTIDE: CPT | Performed by: NURSE PRACTITIONER

## 2019-10-13 PROCEDURE — 63710000001 INSULIN DETEMIR PER 5 UNITS: Performed by: NURSE PRACTITIONER

## 2019-10-13 PROCEDURE — 71046 X-RAY EXAM CHEST 2 VIEWS: CPT

## 2019-10-13 PROCEDURE — 85379 FIBRIN DEGRADATION QUANT: CPT | Performed by: NURSE PRACTITIONER

## 2019-10-13 PROCEDURE — 80202 ASSAY OF VANCOMYCIN: CPT | Performed by: NURSE PRACTITIONER

## 2019-10-13 PROCEDURE — 25010000002 VANCOMYCIN 5 G RECONSTITUTED SOLUTION: Performed by: NURSE PRACTITIONER

## 2019-10-13 PROCEDURE — 0 TECHNETIUM ALBUMIN AGGREGATED: Performed by: INTERNAL MEDICINE

## 2019-10-13 PROCEDURE — 74022 RADEX COMPL AQT ABD SERIES: CPT

## 2019-10-13 PROCEDURE — 85025 COMPLETE CBC W/AUTO DIFF WBC: CPT | Performed by: NURSE PRACTITIONER

## 2019-10-13 PROCEDURE — 25010000002 FUROSEMIDE PER 20 MG: Performed by: NURSE PRACTITIONER

## 2019-10-13 PROCEDURE — 93306 TTE W/DOPPLER COMPLETE: CPT | Performed by: INTERNAL MEDICINE

## 2019-10-13 PROCEDURE — 80053 COMPREHEN METABOLIC PANEL: CPT | Performed by: NURSE PRACTITIONER

## 2019-10-13 PROCEDURE — 25010000002 CEFEPIME PER 500 MG: Performed by: NURSE PRACTITIONER

## 2019-10-13 PROCEDURE — 25010000002 HEPARIN (PORCINE) PER 1000 UNITS: Performed by: NURSE PRACTITIONER

## 2019-10-13 PROCEDURE — 78580 LUNG PERFUSION IMAGING: CPT

## 2019-10-13 PROCEDURE — 63710000001 INSULIN ASPART PER 5 UNITS: Performed by: NURSE PRACTITIONER

## 2019-10-13 RX ORDER — FUROSEMIDE 10 MG/ML
20 INJECTION INTRAMUSCULAR; INTRAVENOUS ONCE
Status: COMPLETED | OUTPATIENT
Start: 2019-10-13 | End: 2019-10-13

## 2019-10-13 RX ADMIN — CLONAZEPAM 0.5 MG: 0.5 TABLET ORAL at 09:11

## 2019-10-13 RX ADMIN — VANCOMYCIN HYDROCHLORIDE 750 MG: 500 INJECTION, POWDER, LYOPHILIZED, FOR SOLUTION INTRAVENOUS at 21:23

## 2019-10-13 RX ADMIN — INSULIN DETEMIR 15 UNITS: 100 INJECTION, SOLUTION SUBCUTANEOUS at 09:11

## 2019-10-13 RX ADMIN — ACETAMINOPHEN 650 MG: 325 TABLET, FILM COATED ORAL at 17:33

## 2019-10-13 RX ADMIN — RISPERIDONE 0.25 MG: 0.25 TABLET ORAL at 09:11

## 2019-10-13 RX ADMIN — Medication 1 DOSE: at 16:15

## 2019-10-13 RX ADMIN — HYDROCODONE BITARTRATE AND ACETAMINOPHEN 1 TABLET: 7.5; 325 TABLET ORAL at 04:04

## 2019-10-13 RX ADMIN — ATORVASTATIN CALCIUM 10 MG: 10 TABLET, FILM COATED ORAL at 21:15

## 2019-10-13 RX ADMIN — ACETAMINOPHEN 650 MG: 325 TABLET, FILM COATED ORAL at 01:42

## 2019-10-13 RX ADMIN — SODIUM CHLORIDE, PRESERVATIVE FREE 10 ML: 5 INJECTION INTRAVENOUS at 21:15

## 2019-10-13 RX ADMIN — HYDROCODONE BITARTRATE AND ACETAMINOPHEN 1 TABLET: 7.5; 325 TABLET ORAL at 21:15

## 2019-10-13 RX ADMIN — CEFEPIME HYDROCHLORIDE 2 G: 2 INJECTION, POWDER, FOR SOLUTION INTRAVENOUS at 04:04

## 2019-10-13 RX ADMIN — FUROSEMIDE 20 MG: 10 INJECTION, SOLUTION INTRAVENOUS at 12:32

## 2019-10-13 RX ADMIN — HYDROCORTISONE: 1 CREAM TOPICAL at 21:17

## 2019-10-13 RX ADMIN — CLOPIDOGREL BISULFATE 75 MG: 75 TABLET ORAL at 09:11

## 2019-10-13 RX ADMIN — HEPARIN SODIUM 5000 UNITS: 5000 INJECTION INTRAVENOUS; SUBCUTANEOUS at 21:15

## 2019-10-13 RX ADMIN — SODIUM CHLORIDE, PRESERVATIVE FREE 10 ML: 5 INJECTION INTRAVENOUS at 09:11

## 2019-10-13 RX ADMIN — INSULIN ASPART 4 UNITS: 100 INJECTION, SOLUTION INTRAVENOUS; SUBCUTANEOUS at 21:20

## 2019-10-13 RX ADMIN — HEPARIN SODIUM 5000 UNITS: 5000 INJECTION INTRAVENOUS; SUBCUTANEOUS at 09:11

## 2019-10-13 RX ADMIN — PANTOPRAZOLE SODIUM 40 MG: 40 TABLET, DELAYED RELEASE ORAL at 09:11

## 2019-10-13 RX ADMIN — CLONAZEPAM 0.5 MG: 0.5 TABLET ORAL at 21:15

## 2019-10-13 RX ADMIN — METOPROLOL TARTRATE 50 MG: 50 TABLET ORAL at 09:11

## 2019-10-13 NOTE — PROGRESS NOTES
"Pharmacokinetics by Pharmacy - Vancomycin    Melissa Parham is a 74 y.o. female receiving vancomycin day 2 for pneumonia  Patient is also receiving cefepime    Objective:  [Ht: 154.9 cm (61\"); Wt: 68.4 kg (150 lb 12.8 oz)]     Lab Results   Component Value Date    WBC 7.85 10/13/2019    WBC 9.68 10/12/2019    WBC 11.67 (H) 10/11/2019      Lab Results   Component Value Date    LACTATE 1.5 10/10/2019      Temp Readings from Last 1 Encounters:   10/13/19 97.2 °F (36.2 °C) (Axillary)     Estimated Creatinine Clearance: 31.6 mL/min (A) (by C-G formula based on SCr of 1.38 mg/dL (H)).   Lab Results   Component Value Date    CREATININE 1.38 (H) 10/13/2019    CREATININE 1.74 (H) 10/12/2019    CREATININE 2.01 (H) 10/11/2019       No results found for: VANCOPEAK, VANCOTROUGH, VANCORANDOM    Culture Results:  Microbiology Results (last 10 days)       Procedure Component Value - Date/Time    MRSA Screen, PCR - Swab, Nares [[id number]]  (Normal) Collected:  10/13/19 0116    Lab Status:  Final result Specimen:  Swab from Nares Updated:  10/13/19 0507     MRSA, PCR Negative    Narrative:       Performed by real-time polymerase chain reaction (qPCR).    Respiratory Panel, PCR - Swab, Nasopharynx [[id number]]  (Normal) Collected:  10/11/19 1816    Lab Status:  Final result Specimen:  Swab from Nasopharynx Updated:  10/11/19 2008     ADENOVIRUS, PCR Not Detected     Coronavirus 229E Not Detected     Coronavirus HKU1 Not Detected     Coronavirus NL63 Not Detected     Coronavirus OC43 Not Detected     Human Metapneumovirus Not Detected     Human Rhinovirus/Enterovirus Not Detected     Influenza B PCR Not Detected     Parainfluenza Virus 1 Not Detected     Parainfluenza Virus 2 Not Detected     Parainfluenza Virus 3 Not Detected     Parainfluenza Virus 4 Not Detected     Bordetella pertussis pcr Not Detected     Influenza A H1 2009 PCR Not Detected     Chlamydophila pneumoniae PCR Not Detected     Mycoplasma pneumo by PCR Not " Detected     Influenza A PCR Not Detected     Influenza A H3 Not Detected     Influenza A H1 Not Detected     RSV, PCR Not Detected    Legionella Antigen, Urine - Urine, Urine, Clean Catch [[id number]]  (Normal) Collected:  10/11/19 1632    Lab Status:  Final result Specimen:  Urine, Clean Catch Updated:  10/11/19 1709     LEGIONELLA ANTIGEN, URINE Negative    S. Pneumo Ag Urine or CSF - Urine, Urine, Clean Catch [[id number]]  (Normal) Collected:  10/11/19 1632    Lab Status:  Final result Specimen:  Urine, Clean Catch Updated:  10/11/19 1709     Strep Pneumo Ag Negative    Influenza Antigen, Rapid - Swab, Nasopharynx [[id number]]  (Normal) Collected:  10/10/19 1546    Lab Status:  Final result Specimen:  Swab from Nasopharynx Updated:  10/10/19 1622     Influenza A Ag, EIA Negative     Influenza B Ag, EIA Negative    Urine Culture - Urine, Urine, Clean Catch [[id number]]  (Abnormal)  (Susceptibility) Collected:  10/10/19 1533    Lab Status:  Final result Specimen:  Urine, Clean Catch Updated:  10/12/19 0347     Urine Culture >100,000 CFU/mL Escherichia coli    Susceptibility        Escherichia coli     TABATHA     Ampicillin Resistant     Ampicillin + Sulbactam Intermediate     Cefazolin Susceptible     Cefepime Susceptible     Ceftazidime Susceptible     Ceftriaxone Susceptible     Gentamicin Susceptible     Levofloxacin Susceptible     Nitrofurantoin Susceptible     Piperacillin + Tazobactam Susceptible     Tetracycline Resistant     Trimethoprim + Sulfamethoxazole Resistant                                   Assessment:  WBC 7.85  SCr 1.38 - improving  Febrile to 101.9 overnight     10/10 urine - E. Coli  10/10 negative influenza, strep pneumo, legionella  10/11 negative respiratory panel  10/12 blood - in process  10/13 negative MRSA PCR    Spoke with MACHO Akhtar - recommended discontinuing vancomycin given negative MRSA PCR - stated she is now concerned patient may have source of infection other than pneumonia  given recent fever - would like to continue vancomycin at this time    Received 1250 mg x1 10/12 at 2200    Utilizing AUC dosing for consulted indication of pneumonia  Below regimen is estimated to provide an AUC of 511 (goal 400-600)      Plan:  1. Vancomycin 750 mg Q24H - next dose 10/13 at 2200  2. No levels ordered at this time - hope to deescalate soon  3. Pharmacy will monitor renal function and adjust dose accordingly.      Emily Rutherford RPH   10/13/19 1:41 PM

## 2019-10-13 NOTE — PROGRESS NOTES
"Pharmacokinetics by Pharmacy - Vancomycin Initial Consult    Melissa Parham is a 74 y.o. female being initiated on vancomycin for pneumonia. Patient is also receiving cefepime.    Objective:     [Ht: 154.9 cm (61\"); Wt: 69.1 kg (152 lb 4.8 oz)]     Lab Results   Component Value Date    WBC 9.68 10/12/2019    WBC 11.67 (H) 10/11/2019    WBC 9.40 10/10/2019      Lab Results   Component Value Date    LACTATE 1.5 10/10/2019      Temp Readings from Last 1 Encounters:   10/12/19 (!) 101.1 °F (38.4 °C) (Axillary)     Estimated Creatinine Clearance: 25.2 mL/min (A) (by C-G formula based on SCr of 1.74 mg/dL (H)).   Lab Results   Component Value Date    CREATININE 1.74 (H) 10/12/2019    CREATININE 2.01 (H) 10/11/2019    CREATININE 1.21 (H) 10/10/2019       Baseline culture results:  Microbiology Results (last 10 days)       Procedure Component Value - Date/Time    Respiratory Panel, PCR - Swab, Nasopharynx [[id number]]  (Normal) Collected:  10/11/19 1816    Lab Status:  Final result Specimen:  Swab from Nasopharynx Updated:  10/11/19 2008     ADENOVIRUS, PCR Not Detected     Coronavirus 229E Not Detected     Coronavirus HKU1 Not Detected     Coronavirus NL63 Not Detected     Coronavirus OC43 Not Detected     Human Metapneumovirus Not Detected     Human Rhinovirus/Enterovirus Not Detected     Influenza B PCR Not Detected     Parainfluenza Virus 1 Not Detected     Parainfluenza Virus 2 Not Detected     Parainfluenza Virus 3 Not Detected     Parainfluenza Virus 4 Not Detected     Bordetella pertussis pcr Not Detected     Influenza A H1 2009 PCR Not Detected     Chlamydophila pneumoniae PCR Not Detected     Mycoplasma pneumo by PCR Not Detected     Influenza A PCR Not Detected     Influenza A H3 Not Detected     Influenza A H1 Not Detected     RSV, PCR Not Detected    Legionella Antigen, Urine - Urine, Urine, Clean Catch [[id number]]  (Normal) Collected:  10/11/19 1632    Lab Status:  Final result Specimen:  Urine, Clean " Catch Updated:  10/11/19 1709     LEGIONELLA ANTIGEN, URINE Negative    S. Pneumo Ag Urine or CSF - Urine, Urine, Clean Catch [[id number]]  (Normal) Collected:  10/11/19 1632    Lab Status:  Final result Specimen:  Urine, Clean Catch Updated:  10/11/19 1709     Strep Pneumo Ag Negative    Influenza Antigen, Rapid - Swab, Nasopharynx [[id number]]  (Normal) Collected:  10/10/19 1546    Lab Status:  Final result Specimen:  Swab from Nasopharynx Updated:  10/10/19 1622     Influenza A Ag, EIA Negative     Influenza B Ag, EIA Negative    Urine Culture - Urine, Urine, Clean Catch [[id number]]  (Abnormal)  (Susceptibility) Collected:  10/10/19 1533    Lab Status:  Final result Specimen:  Urine, Clean Catch Updated:  10/12/19 0347     Urine Culture >100,000 CFU/mL Escherichia coli    Susceptibility        Escherichia coli     TABATHA     Ampicillin Resistant     Ampicillin + Sulbactam Intermediate     Cefazolin Susceptible     Cefepime Susceptible     Ceftazidime Susceptible     Ceftriaxone Susceptible     Gentamicin Susceptible     Levofloxacin Susceptible     Nitrofurantoin Susceptible     Piperacillin + Tazobactam Susceptible     Tetracycline Resistant     Trimethoprim + Sulfamethoxazole Resistant                                 Assessment  WBC trended down   SCr, JAYCEE due to retention 10/11, beal replaced, output, SCr improving, will monitor  Febrile x 2 today, adjusting therapy    Labs in progress:  10/12 BCx2 IP  10/12 Nasal PCR IP  10/11 antigens negative  10/10 urine E.coli (see sensitivity)    Will review PCR 10/13  Expect SCr to continue to improve, will order random 24 hour vancomycin as CrCl <30 and resume vancomycin dosing upon return    Utilizing AUC dosing  Goal AUC for pneumonia: 400-600    Plan  1. Give vancomycin 1250mg IV x 1 followed by vancomycin 1000mg IV Q24H  2. Will order vancomycin random 10/13 2100  3. Pharmacy will monitor renal function and adjust dose accordingly.    Wenceslao Squires, [facility]  10/12/19  7:47 PM

## 2019-10-13 NOTE — PLAN OF CARE
Problem: Patient Care Overview  Goal: Plan of Care Review  Outcome: Ongoing (interventions implemented as appropriate)   10/12/19 6462   Coping/Psychosocial   Plan of Care Reviewed With patient;family   Plan of Care Review   Progress no change   OTHER   Outcome Summary pt resting. initiated vancomycin for pneumonia per Renetta Maciel. Creatinine improving. sister at bedside. will continue to monitor pt      Goal: Individualization and Mutuality  Outcome: Ongoing (interventions implemented as appropriate)    Goal: Discharge Needs Assessment  Outcome: Ongoing (interventions implemented as appropriate)    Goal: Interprofessional Rounds/Family Conf  Outcome: Ongoing (interventions implemented as appropriate)      Problem: Sepsis/Septic Shock (Adult)  Goal: Signs and Symptoms of Listed Potential Problems Will be Absent, Minimized or Managed (Sepsis/Septic Shock)  Outcome: Ongoing (interventions implemented as appropriate)      Problem: Fall Risk (Adult)  Goal: Absence of Fall  Outcome: Ongoing (interventions implemented as appropriate)      Problem: Skin Injury Risk (Adult)  Goal: Skin Health and Integrity  Outcome: Ongoing (interventions implemented as appropriate)      Problem: Hearing Impairment/Deaf (Adult,Obstetrics,Pediatric)  Goal: Enhanced Communication  Outcome: Ongoing (interventions implemented as appropriate)      Problem: Urinary Tract Infection (Adult)  Goal: Signs and Symptoms of Listed Potential Problems Will be Absent, Minimized or Managed (Urinary Tract Infection)  Outcome: Ongoing (interventions implemented as appropriate)

## 2019-10-14 LAB
ALBUMIN SERPL-MCNC: 2.6 G/DL (ref 3.5–5.2)
ALBUMIN/GLOB SERPL: 0.7 G/DL
ALP SERPL-CCNC: 109 U/L (ref 39–117)
ALT SERPL W P-5'-P-CCNC: 37 U/L (ref 1–33)
ANION GAP SERPL CALCULATED.3IONS-SCNC: 11 MMOL/L (ref 5–15)
AST SERPL-CCNC: 49 U/L (ref 1–32)
BASOPHILS # BLD AUTO: 0.03 10*3/MM3 (ref 0–0.2)
BASOPHILS NFR BLD AUTO: 0.4 % (ref 0–1.5)
BH CV ECHO MEAS - ACS: 1.5 CM
BH CV ECHO MEAS - AO ISTHMUS: 1.7 CM
BH CV ECHO MEAS - AO MAX PG (FULL): 5.4 MMHG
BH CV ECHO MEAS - AO MAX PG: 8.4 MMHG
BH CV ECHO MEAS - AO MEAN PG (FULL): 3 MMHG
BH CV ECHO MEAS - AO MEAN PG: 4 MMHG
BH CV ECHO MEAS - AO ROOT AREA (BSA CORRECTED): 1.4
BH CV ECHO MEAS - AO ROOT AREA: 4.2 CM^2
BH CV ECHO MEAS - AO ROOT DIAM: 2.3 CM
BH CV ECHO MEAS - AO V2 MAX: 145 CM/SEC
BH CV ECHO MEAS - AO V2 MEAN: 99.1 CM/SEC
BH CV ECHO MEAS - AO V2 VTI: 20.8 CM
BH CV ECHO MEAS - ASC AORTA: 2.1 CM
BH CV ECHO MEAS - AVA(I,A): 1.4 CM^2
BH CV ECHO MEAS - AVA(I,D): 1.4 CM^2
BH CV ECHO MEAS - AVA(V,A): 1.4 CM^2
BH CV ECHO MEAS - AVA(V,D): 1.4 CM^2
BH CV ECHO MEAS - BSA(HAYCOCK): 1.7 M^2
BH CV ECHO MEAS - BSA: 1.7 M^2
BH CV ECHO MEAS - BZI_BMI: 28.3 KILOGRAMS/M^2
BH CV ECHO MEAS - BZI_METRIC_HEIGHT: 154.9 CM
BH CV ECHO MEAS - BZI_METRIC_WEIGHT: 68 KG
BH CV ECHO MEAS - EDV(CUBED): 124.3 ML
BH CV ECHO MEAS - EDV(TEICH): 117.7 ML
BH CV ECHO MEAS - EF(CUBED): 54.4 %
BH CV ECHO MEAS - EF(TEICH): 46 %
BH CV ECHO MEAS - ESV(CUBED): 56.6 ML
BH CV ECHO MEAS - ESV(TEICH): 63.5 ML
BH CV ECHO MEAS - FS: 23 %
BH CV ECHO MEAS - IVSD: 0.87 CM
BH CV ECHO MEAS - LA DIMENSION: 3.6 CM
BH CV ECHO MEAS - LA/AO: 1.6
BH CV ECHO MEAS - LV MAX PG: 3 MMHG
BH CV ECHO MEAS - LV MEAN PG: 1 MMHG
BH CV ECHO MEAS - LV V1 MAX: 86.7 CM/SEC
BH CV ECHO MEAS - LV V1 MEAN: 51.2 CM/SEC
BH CV ECHO MEAS - LV V1 VTI: 12.6 CM
BH CV ECHO MEAS - LVIDD: 5 CM
BH CV ECHO MEAS - LVIDS: 3.8 CM
BH CV ECHO MEAS - LVOT AREA (M): 2.3 CM^2
BH CV ECHO MEAS - LVOT AREA: 2.3 CM^2
BH CV ECHO MEAS - LVOT DIAM: 1.7 CM
BH CV ECHO MEAS - MV A MAX VEL: 102 CM/SEC
BH CV ECHO MEAS - MV DEC SLOPE: 1081 CM/SEC^2
BH CV ECHO MEAS - MV E MAX VEL: 90.3 CM/SEC
BH CV ECHO MEAS - MV E/A: 0.89
BH CV ECHO MEAS - MV MAX PG: 6.4 MMHG
BH CV ECHO MEAS - MV MEAN PG: 3 MMHG
BH CV ECHO MEAS - MV P1/2T MAX VEL: 126 CM/SEC
BH CV ECHO MEAS - MV P1/2T: 34.1 MSEC
BH CV ECHO MEAS - MV V2 MAX: 126 CM/SEC
BH CV ECHO MEAS - MV V2 MEAN: 83.4 CM/SEC
BH CV ECHO MEAS - MV V2 VTI: 29.9 CM
BH CV ECHO MEAS - MVA P1/2T LCG: 1.7 CM^2
BH CV ECHO MEAS - MVA(P1/2T): 6.4 CM^2
BH CV ECHO MEAS - MVA(VTI): 0.96 CM^2
BH CV ECHO MEAS - PA MAX PG: 5.9 MMHG
BH CV ECHO MEAS - PA V2 MAX: 121 CM/SEC
BH CV ECHO MEAS - PI END-D VEL: 128 CM/SEC
BH CV ECHO MEAS - RVDD: 2.1 CM
BH CV ECHO MEAS - SI(AO): 51.7 ML/M^2
BH CV ECHO MEAS - SI(CUBED): 40.5 ML/M^2
BH CV ECHO MEAS - SI(LVOT): 17.1 ML/M^2
BH CV ECHO MEAS - SI(TEICH): 32.4 ML/M^2
BH CV ECHO MEAS - SV(AO): 86.4 ML
BH CV ECHO MEAS - SV(CUBED): 67.6 ML
BH CV ECHO MEAS - SV(LVOT): 28.6 ML
BH CV ECHO MEAS - SV(TEICH): 54.2 ML
BH CV ECHO MEAS - TR MAX VEL: 200 CM/SEC
BILIRUB SERPL-MCNC: 0.3 MG/DL (ref 0.2–1.2)
BUN BLD-MCNC: 26 MG/DL (ref 8–23)
BUN/CREAT SERPL: 19.7 (ref 7–25)
CALCIUM SPEC-SCNC: 8.6 MG/DL (ref 8.6–10.5)
CHLORIDE SERPL-SCNC: 104 MMOL/L (ref 98–107)
CO2 SERPL-SCNC: 25 MMOL/L (ref 22–29)
CREAT BLD-MCNC: 1.32 MG/DL (ref 0.57–1)
DEPRECATED RDW RBC AUTO: 38.8 FL (ref 37–54)
EOSINOPHIL # BLD AUTO: 0.15 10*3/MM3 (ref 0–0.4)
EOSINOPHIL NFR BLD AUTO: 2.2 % (ref 0.3–6.2)
ERYTHROCYTE [DISTWIDTH] IN BLOOD BY AUTOMATED COUNT: 13.2 % (ref 12.3–15.4)
GFR SERPL CREATININE-BSD FRML MDRD: 39 ML/MIN/1.73
GLOBULIN UR ELPH-MCNC: 3.9 GM/DL
GLUCOSE BLD-MCNC: 98 MG/DL (ref 65–99)
GLUCOSE BLDC GLUCOMTR-MCNC: 136 MG/DL (ref 70–130)
GLUCOSE BLDC GLUCOMTR-MCNC: 189 MG/DL (ref 70–130)
GLUCOSE BLDC GLUCOMTR-MCNC: 195 MG/DL (ref 70–130)
GLUCOSE BLDC GLUCOMTR-MCNC: 95 MG/DL (ref 70–130)
HCT VFR BLD AUTO: 27.5 % (ref 34–46.6)
HGB BLD-MCNC: 9.2 G/DL (ref 12–15.9)
IMM GRANULOCYTES # BLD AUTO: 0.06 10*3/MM3 (ref 0–0.05)
IMM GRANULOCYTES NFR BLD AUTO: 0.9 % (ref 0–0.5)
LV EF 2D ECHO EST: 50 %
LYMPHOCYTES # BLD AUTO: 1.06 10*3/MM3 (ref 0.7–3.1)
LYMPHOCYTES NFR BLD AUTO: 15.8 % (ref 19.6–45.3)
MCH RBC QN AUTO: 27.1 PG (ref 26.6–33)
MCHC RBC AUTO-ENTMCNC: 33.5 G/DL (ref 31.5–35.7)
MCV RBC AUTO: 80.9 FL (ref 79–97)
MONOCYTES # BLD AUTO: 0.98 10*3/MM3 (ref 0.1–0.9)
MONOCYTES NFR BLD AUTO: 14.6 % (ref 5–12)
NEUTROPHILS # BLD AUTO: 4.42 10*3/MM3 (ref 1.7–7)
NEUTROPHILS NFR BLD AUTO: 66.1 % (ref 42.7–76)
NRBC BLD AUTO-RTO: 0 /100 WBC (ref 0–0.2)
PLATELET # BLD AUTO: 167 10*3/MM3 (ref 140–450)
PMV BLD AUTO: 9.5 FL (ref 6–12)
POTASSIUM BLD-SCNC: 3.5 MMOL/L (ref 3.5–5.2)
PROT SERPL-MCNC: 6.5 G/DL (ref 6–8.5)
RBC # BLD AUTO: 3.4 10*6/MM3 (ref 3.77–5.28)
SODIUM BLD-SCNC: 140 MMOL/L (ref 136–145)
WBC NRBC COR # BLD: 6.7 10*3/MM3 (ref 3.4–10.8)

## 2019-10-14 PROCEDURE — 82962 GLUCOSE BLOOD TEST: CPT

## 2019-10-14 PROCEDURE — 25010000002 FUROSEMIDE PER 20 MG: Performed by: NURSE PRACTITIONER

## 2019-10-14 PROCEDURE — 80053 COMPREHEN METABOLIC PANEL: CPT | Performed by: NURSE PRACTITIONER

## 2019-10-14 PROCEDURE — 97166 OT EVAL MOD COMPLEX 45 MIN: CPT

## 2019-10-14 PROCEDURE — 63710000001 INSULIN ASPART PER 5 UNITS: Performed by: NURSE PRACTITIONER

## 2019-10-14 PROCEDURE — 25010000002 HEPARIN (PORCINE) PER 1000 UNITS: Performed by: NURSE PRACTITIONER

## 2019-10-14 PROCEDURE — 97530 THERAPEUTIC ACTIVITIES: CPT

## 2019-10-14 PROCEDURE — 97110 THERAPEUTIC EXERCISES: CPT

## 2019-10-14 PROCEDURE — 25010000002 CEFEPIME PER 500 MG: Performed by: NURSE PRACTITIONER

## 2019-10-14 PROCEDURE — 97116 GAIT TRAINING THERAPY: CPT

## 2019-10-14 PROCEDURE — 63710000001 INSULIN DETEMIR PER 5 UNITS: Performed by: NURSE PRACTITIONER

## 2019-10-14 PROCEDURE — 85025 COMPLETE CBC W/AUTO DIFF WBC: CPT | Performed by: NURSE PRACTITIONER

## 2019-10-14 RX ORDER — SERTRALINE HYDROCHLORIDE 25 MG/1
25 TABLET, FILM COATED ORAL DAILY
Status: DISCONTINUED | OUTPATIENT
Start: 2019-10-14 | End: 2019-10-17 | Stop reason: HOSPADM

## 2019-10-14 RX ORDER — FUROSEMIDE 10 MG/ML
20 INJECTION INTRAMUSCULAR; INTRAVENOUS EVERY 12 HOURS
Status: DISCONTINUED | OUTPATIENT
Start: 2019-10-14 | End: 2019-10-17 | Stop reason: HOSPADM

## 2019-10-14 RX ORDER — CETIRIZINE HYDROCHLORIDE 5 MG/1
5 TABLET ORAL DAILY
Status: DISCONTINUED | OUTPATIENT
Start: 2019-10-14 | End: 2019-10-17 | Stop reason: HOSPADM

## 2019-10-14 RX ADMIN — INSULIN DETEMIR 15 UNITS: 100 INJECTION, SOLUTION SUBCUTANEOUS at 09:50

## 2019-10-14 RX ADMIN — CLONAZEPAM 0.5 MG: 0.5 TABLET ORAL at 21:00

## 2019-10-14 RX ADMIN — FUROSEMIDE 20 MG: 10 INJECTION, SOLUTION INTRAVENOUS at 17:02

## 2019-10-14 RX ADMIN — HEPARIN SODIUM 5000 UNITS: 5000 INJECTION INTRAVENOUS; SUBCUTANEOUS at 09:48

## 2019-10-14 RX ADMIN — CLOPIDOGREL BISULFATE 75 MG: 75 TABLET ORAL at 09:48

## 2019-10-14 RX ADMIN — SODIUM CHLORIDE, PRESERVATIVE FREE 10 ML: 5 INJECTION INTRAVENOUS at 21:00

## 2019-10-14 RX ADMIN — CEFEPIME HYDROCHLORIDE 2 G: 2 INJECTION, POWDER, FOR SOLUTION INTRAVENOUS at 06:25

## 2019-10-14 RX ADMIN — SERTRALINE HYDROCHLORIDE 25 MG: 25 TABLET ORAL at 17:02

## 2019-10-14 RX ADMIN — HYDROCODONE BITARTRATE AND ACETAMINOPHEN 1 TABLET: 7.5; 325 TABLET ORAL at 18:05

## 2019-10-14 RX ADMIN — CLONAZEPAM 0.5 MG: 0.5 TABLET ORAL at 09:47

## 2019-10-14 RX ADMIN — HYDROCORTISONE: 1 CREAM TOPICAL at 09:48

## 2019-10-14 RX ADMIN — HEPARIN SODIUM 5000 UNITS: 5000 INJECTION INTRAVENOUS; SUBCUTANEOUS at 21:00

## 2019-10-14 RX ADMIN — METOPROLOL TARTRATE 50 MG: 50 TABLET ORAL at 09:48

## 2019-10-14 RX ADMIN — HYDROCODONE BITARTRATE AND ACETAMINOPHEN 1 TABLET: 7.5; 325 TABLET ORAL at 09:51

## 2019-10-14 RX ADMIN — HYDROCORTISONE: 1 CREAM TOPICAL at 21:03

## 2019-10-14 RX ADMIN — CETIRIZINE HYDROCHLORIDE 5 MG: 5 TABLET ORAL at 17:02

## 2019-10-14 RX ADMIN — ATORVASTATIN CALCIUM 10 MG: 10 TABLET, FILM COATED ORAL at 21:00

## 2019-10-14 RX ADMIN — RISPERIDONE 0.25 MG: 0.25 TABLET ORAL at 09:48

## 2019-10-14 RX ADMIN — INSULIN ASPART 2 UNITS: 100 INJECTION, SOLUTION INTRAVENOUS; SUBCUTANEOUS at 21:00

## 2019-10-14 RX ADMIN — PANTOPRAZOLE SODIUM 40 MG: 40 TABLET, DELAYED RELEASE ORAL at 06:08

## 2019-10-14 RX ADMIN — HYDROCODONE BITARTRATE AND ACETAMINOPHEN 1 TABLET: 7.5; 325 TABLET ORAL at 03:09

## 2019-10-14 RX ADMIN — SODIUM CHLORIDE, PRESERVATIVE FREE 10 ML: 5 INJECTION INTRAVENOUS at 09:47

## 2019-10-14 RX ADMIN — INSULIN ASPART 2 UNITS: 100 INJECTION, SOLUTION INTRAVENOUS; SUBCUTANEOUS at 14:06

## 2019-10-14 NOTE — PLAN OF CARE
Problem: Patient Care Overview  Goal: Plan of Care Review   10/14/19 0117   Coping/Psychosocial   Plan of Care Reviewed With patient   Plan of Care Review   Progress no change   OTHER   Outcome Summary Pt sleeping. no acute events overnight. v/s stable. will continue to monitor pt     Goal: Individualization and Mutuality  Outcome: Ongoing (interventions implemented as appropriate)    Goal: Discharge Needs Assessment  Outcome: Ongoing (interventions implemented as appropriate)    Goal: Interprofessional Rounds/Family Conf  Outcome: Ongoing (interventions implemented as appropriate)      Problem: Sepsis/Septic Shock (Adult)  Goal: Signs and Symptoms of Listed Potential Problems Will be Absent, Minimized or Managed (Sepsis/Septic Shock)  Outcome: Ongoing (interventions implemented as appropriate)      Problem: Fall Risk (Adult)  Goal: Absence of Fall  Outcome: Ongoing (interventions implemented as appropriate)      Problem: Skin Injury Risk (Adult)  Goal: Skin Health and Integrity  Outcome: Ongoing (interventions implemented as appropriate)      Problem: Hearing Impairment/Deaf (Adult,Obstetrics,Pediatric)  Goal: Enhanced Communication  Outcome: Ongoing (interventions implemented as appropriate)      Problem: Urinary Tract Infection (Adult)  Goal: Signs and Symptoms of Listed Potential Problems Will be Absent, Minimized or Managed (Urinary Tract Infection)  Outcome: Ongoing (interventions implemented as appropriate)

## 2019-10-14 NOTE — PLAN OF CARE
Problem: Patient Care Overview  Goal: Plan of Care Review  Outcome: Ongoing (interventions implemented as appropriate)   10/14/19 1323   Coping/Psychosocial   Plan of Care Reviewed With patient;sibling   OTHER   Outcome Summary OT ellie completed this date. Pt engaged with redirection which sister assisted. Pt was CGA for sup to sit to sup. Pt CGA for sit to stand off toilet and bed and averaged mod assist with toileting. Pt min assist with handwashing. pt SBA to don socks in bed but became SOB quickly. Pt CGA to min assist with mobility with needed HHA and unsteady. pt could benefit from further skilled OT to reach PLOF/max independence with ADL. Recommend 24/7 care and further therapy at d/c.

## 2019-10-14 NOTE — THERAPY EVALUATION
Acute Care - Occupational Therapy Initial Evaluation  [facility]     Patient Name: Melissa Parham  : 1945  MRN: [id number]  Today's Date: 10/14/2019  Onset of Illness/Injury or Date of Surgery: 10/10/19  Date of Referral to OT: 10/10/19  Referring Physician: Renetta PRITCHARD    Admit Date: 10/10/2019       ICD-10-CM ICD-9-CM   1. Acute pyelonephritis N10 590.10   2. Impaired functional mobility and endurance Z74.09 V49.89   3. Impaired mobility and ADLs Z74.09 799.89     Patient Active Problem List   Diagnosis   • Acute pyelonephritis     Past Medical History:   Diagnosis Date   • Arthritis    • CHF (congestive heart failure) (CMS/[facility])    • Deaf    • Diabetes mellitus (CMS/[facility])    • Elevated cholesterol    • Hypertension    • Nonverbal      History reviewed. No pertinent surgical history.       OT ASSESSMENT FLOWSHEET (last 12 hours)      Occupational Therapy Evaluation     Row Name 10/14/19 1323                   OT Evaluation Time/Intention    Subjective Information  no complaints  -        Document Type  evaluation  -        Mode of Treatment  individual therapy;occupational therapy  -        Total Evaluation Minutes, Occupational Therapy  33  -        Patient Effort  fair  -        Symptoms Noted During/After Treatment  fatigue  -        Comment  pt sleepy and falling asleep   -           General Information    Patient Profile Reviewed?  yes  -        Onset of Illness/Injury or Date of Surgery  10/10/19  -        Referring Physician  Renetta PRITCHARD  -        Patient Observations  alert;cooperative;agree to therapy sleepy  -        Patient/Family Observations  pt's sister   -        General Observations of Patient  pt sleeping, with HOB up with glasses on, O2 1.5, IV  -        Prior Level of Function  independent:;all household mobility;transfer;bed mobility;ADL's may have recently needed assist with ADL   -        Equipment Currently Used at Home  none  -         Pertinent History of Current Functional Problem  73 yo female presenting with pyelonephritis, cough, fever and fatigue upon admission, atypical pneumonia.  -        Existing Precautions/Restrictions  fall;other (see comments) deaf, non verbal  -        Limitations/Impairments  safety/cognitive;hearing  -        Risks Reviewed  patient and family:;LOB;increased discomfort;change in vital signs  -        Benefits Reviewed  patient and family:;improve function;increase independence;increase strength;increase balance;increase knowledge  -        Barriers to Rehab  hearing deficit;medically complex;cognitive status  -           Relationship/Environment    Lives With  facility resident  -           Resource/Environmental Concerns    Current Living Arrangements  residential [facility]   -           Cognitive Assessment/Interventions    Additional Documentation  Cognitive Assessment/Intervention (Group)  [facility]           Cognitive Assessment/Intervention- PT/OT    Affect/Mental Status (Cognitive)  WFL sleepy   -        Orientation Status (Cognition)  unable/difficult to assess  deaf, non-verbal  -        Follows Commands (Cognition)  follows one step commands;50-74% accuracy;repetition of directions required;physical/tactile prompts required;delayed response/completion;increased processing time needed;initiation impaired  -        Safety Deficit (Cognitive)  moderate deficit  -        Personal Safety Interventions  fall prevention program maintained;gait belt;nonskid shoes/slippers when out of bed;supervised activity alarm not set RN aware, sister present   -           Safety Issues, Functional Mobility    Safety Issues Affecting Function (Mobility)  ability to follow commands;awareness of need for assistance;impulsivity;insight into deficits/self awareness;judgment;problem solving;safety precaution awareness;safety precautions follow-through/compliance;sequencing abilities  [facility]         Impairments Affecting Function (Mobility)  balance;cognition;coordination;endurance/activity tolerance;motor control;motor planning;strength;shortness of breath  -           Bed Mobility Assessment/Treatment    Bed Mobility Assessment/Treatment  supine-sit;sit-supine  -        Supine-Sit [place] (Bed Mobility)  contact guard;nonverbal cues (demo/gesture)  -        Sit-Supine [place] (Bed Mobility)  contact guard;nonverbal cues (demo/gesture)  -        Assistive Device (Bed Mobility)  bed rails;head of bed elevated  -        Comment (Bed Mobility)  pt fatigued and required demo and tactile cues and extended time  -           Functional Mobility    Functional Mobility- Ind. Level  contact guard assist;minimum assist (75% patient effort);nonverbal cues required (demo/gesture)  -        Functional Mobility- Device  -- none  -        Functional Mobility- Comment  pt unsteady and redirection required to engage was wanting [place] assist and unsteady   -           Transfer Assessment/Treatment    Transfer Assessment/Treatment  sit-stand transfer;stand-sit transfer;toilet transfer  -           Sit-Stand Transfer    Sit-Stand [place] (Transfers)  contact guard;nonverbal cues (demo/gesture)  -        Assistive Device (Sit-Stand Transfers)  -- none  -           Stand-Sit Transfer    Stand-Sit [place] (Transfers)  contact guard;nonverbal cues (demo/gesture)  -        Assistive Device (Stand-Sit Transfers)  -- none  -           Toilet Transfer    Type (Toilet Transfer)  stand-sit;sit-stand  -        [place] Level (Toilet Transfer)  contact guard;nonverbal cues (demo/gesture)  -        Assistive Device (Toilet Transfer)  grab bars/safety frame  -           ADL Assessment/Intervention    BADL Assessment/Intervention  lower body dressing;grooming;toileting  -           Lower Body Dressing Assessment/Training    Lower Body Dressing [place] Level  don;socks;nonverbal  cues (demo/gesture);set up SBA   -        Lower Body Dressing Position  long sitting  -           Grooming Assessment/Training    Comment (Grooming)  Pt min assist to stand at the sink and wash her hands  -           Toileting Assessment/Training    [place] Level (Toileting)  toileting skills;moderate assist (50% patient effort)  -        Comment (Toileting)  pt unable to get herself clean with pericare, max difficulty with clothing management.   -           BADL Safety/Performance    Impairments, BADL Safety/Performance  balance;cognition;endurance/activity tolerance;grasp/prehension;coordination;motor control;motor planning;range of motion;strength;shortness of breath;trunk/postural control  -           General ROM    GENERAL ROM COMMENTS  BUE grossly WFL   -           MMT (Manual Muscle Testing)    General MMT Comments  BUE  grossly 4-/; BUE grossly 4-/   -           Sensory Assessment/Intervention    Additional Documentation  Hearing Assessment (Group)  -           Light Touch Sensation Assessment    Comment, Left Upper Extremity: Light Touch Sensation Assessment  unable to accuractly assess secondary to communication  -        Comment, Right Upper Extremity: Light Touch Sensation Assessment  unable to accuractly assess secondary to communication  -           Hearing Assessment    Hearing Status  other (see comments) deaf   -           Vision Assessment/Intervention    Visual Impairment/Limitations  corrective lenses full time  -           Positioning and Restraints    Pre-Treatment Position  in bed  -        Post Treatment Position  bed  -        In Bed  notified nsg;supine;call light within reach;encouraged to call for assist;with family/caregiver;side rails up x2  -           Pain Assessment    Additional Documentation  Pain Scale: Numbers Pre/Post-Treatment (Group)  -           Pain Scale: Numbers Pre/Post-Treatment    Pain Scale: Numbers, Pretreatment  -- unable to  rate - deaf   -        Pain Scale: Numbers, Post-Treatment  -- did not indicate pain   -        Pre/Post Treatment Pain Comment  pt would cough but did not show signs or symptoms of pain   -           Plan of Care Review    Plan of Care Reviewed With  patient;sibling  -           Clinical Impression (OT)    Date of Referral to OT  10/10/19  -        OT Diagnosis  Impaired mobility and ADL   -        Prognosis (OT Eval)  fair  -        Functional Level at Time of Evaluation (OT Eval)  pt decreased endurance, strength, safety and independence with ADL   -        Patient/Family Goals Statement (OT Eval)  to return to [facility]  -        Criteria for Skilled Therapeutic Interventions Met (OT Eval)  yes;treatment indicated  -        Rehab Potential (OT Eval)  fair, will monitor progress closely  -        Therapy Frequency (OT Eval)  other (see comments) 5-7 days a week   -        Predicted Duration of Therapy Intervention (Therapy Eval)  until d/c   -        Care Plan Review (OT)  evaluation/treatment results reviewed;care plan/treatment goals reviewed;risks/benefits reviewed;current/potential barriers reviewed;patient/other agree to care plan  -        Care Plan Review, Other Participant (OT Eval)  sibling  -        Anticipated Discharge Disposition (OT)  anticipate therapy at next level of care;skilled nursing facility  -           Vital Signs    Pretreatment Heart Rate (beats/min)  61  -BH        Intratreatment Heart Rate (beats/min)  66  -BH        Posttreatment Heart Rate (beats/min)  75  -BH        Pre SpO2 (%)  96  -BH        O2 Delivery Pre Treatment  nasal cannula 1.5  -BH        Intra SpO2 (%)  92 90  -BH        O2 Delivery Intra Treatment  supplemental O2  -        Post SpO2 (%)  97  -BH        O2 Delivery Post Treatment  supplemental O2  -        Pre Patient Position  Supine  -        Intra Patient Position  Sitting  -        Post Patient Position  Supine  -            Planned OT Interventions    Planned Therapy Interventions (OT Eval)  activity tolerance training;adaptive equipment training;BADL retraining;cognitive/visual perception retraining;functional balance retraining;IADL retraining;neuromuscular control/coordination retraining;occupation/activity based interventions;passive ROM/stretching;patient/caregiver education/training;ROM/therapeutic exercise;strengthening exercise;transfer/mobility retraining  -           OT Goals    Transfer Goal Selection (OT)  transfer, OT goal 1  -        Bathing Goal Selection (OT)  bathing, OT goal 1  -        Dressing Goal Selection (OT)  dressing, OT goal 1  -        Toileting Goal Selection (OT)  toileting, OT goal 1  -        Grooming Goal Selection (OT)  grooming, OT goal 1  -        Self-Feeding Goal Selection (OT)  self feeding, OT goal 1  -        Functional Mobility Goal Selection (OT)  functional mobility, OT goal 1  -        Additional Documentation  Grooming Goal Selection (OT) (Row);Self-Feeding Goal Selection (OT) (Row);Functional Mobility Selection (OT) (Row)  -           Transfer Goal 1 (OT)    Activity/Assistive Device (Transfer Goal 1, OT)  toilet  -        [place] Level/Cues Needed (Transfer Goal 1, OT)  standby assist  -        Time Frame (Transfer Goal 1, OT)  long term goal (LTG);by discharge  -        Progress/Outcome (Transfer Goal 1, OT)  goal not met  -           Bathing Goal 1 (OT)    Activity/Assistive Device (Bathing Goal 1, OT)  bathing skills, all  -        [place] Level/Cues Needed (Bathing Goal 1, OT)  set-up required;standby assist;tactile cues required  -        Time Frame (Bathing Goal 1, OT)  long term goal (LTG);by discharge  -        Progress/Outcomes (Bathing Goal 1, OT)  goal not met  -           Dressing Goal 1 (OT)    Activity/Assistive Device (Dressing Goal 1, OT)  dressing skills, all  -        [place]/Cues Needed (Dressing Goal 1, OT)  set-up  required;tactile cues required;standby assist  -        Time Frame (Dressing Goal 1, OT)  long term goal (LTG);by discharge  -        Progress/Outcome (Dressing Goal 1, OT)  goal not met  -           Toileting Goal 1 (OT)    Activity/Device (Toileting Goal 1, OT)  toileting skills, all  -        [place] Level/Cues Needed (Toileting Goal 1, OT)  set-up required;tactile cues required;standby assist  -        Time Frame (Toileting Goal 1, OT)  long term goal (LTG);by discharge  -        Progress/Outcome (Toileting Goal 1, OT)  goal not met  -           Grooming Goal 1 (OT)    Activity/Device (Grooming Goal 1, OT)  grooming skills, all  -        [place] (Grooming Goal 1, OT)  set-up required;tactile cues required;standby assist  -        Time Frame (Grooming Goal 1, OT)  long term goal (LTG);by discharge  -        Progress/Outcome (Grooming Goal 1, OT)  goal not met  -           Self-Feeding Goal 1 (OT)    Activity/Assistive Device (Self-Feeding Goal 1, OT)  self-feeding skills, all  -        [place] Level/Cues Needed (Self-Feeding Goal 1, OT)  set-up required;tactile cues required;standby assist  -        Time Frame (Self-Feeding Goal 1, OT)  long term goal (LTG);by discharge  -        Progress/Outcomes (Self-Feeding Goal 1, OT)  goal not met  -           Functional Mobility Goal 1 (OT)    Activity/Assistive Device (Functional Mobility Goal 1, OT)  -- as needed  -        [place] Level/Cues Needed (Functional Mobility Goal 1, OT)  standby assist  -        Distance Goal 1 (Functional Mobility, OT)  for ADL tasks with no LOB and fair safety   -        Time Frame (Functional Mobility Goal 1, OT)  long term goal (LTG);by discharge  -        Progress/Outcome (Functional Mobility Goal 1, OT)  goal not met  -           Living Environment    Home Accessibility  wheelchair accessible  -          User Key  (r) = Recorded By, (t) = Taken By, (c) = Cosigned By     Initials Name Effective Dates     Cielo Stewart OTR/L 06/08/18 -          Occupational Therapy Education     Title: PT OT SLP Therapies (In Progress)     Topic: Occupational Therapy (In Progress)     Point: ADL training (Done)     Description: Instruct learner(s) on proper safety adaptation and remediation techniques during self care or transfers.   Instruct in proper use of assistive devices.    Learning Progress Summary           Patient Acceptance, E, VU,NR by  at 10/14/2019  3:12 PM    Comment:  Educated about OT and POC. Educated to call for assist. Educated on safety throughout.   Family Acceptance, E, VU,NR by  at 10/14/2019  3:12 PM    Comment:  Educated about OT and POC. Educated to call for assist. Educated on safety throughout.                   Point: Precautions (Done)     Description: Instruct learner(s) on prescribed precautions during self-care and functional transfers.    Learning Progress Summary           Patient Acceptance, E, VU,NR by  at 10/14/2019  3:12 PM    Comment:  Educated about OT and POC. Educated to call for assist. Educated on safety throughout.   Family Acceptance, E, VU,NR by  at 10/14/2019  3:12 PM    Comment:  Educated about OT and POC. Educated to call for assist. Educated on safety throughout.                               User Key     Initials Effective Dates Name Provider Type Discipline     06/08/18 -  Cielo Stewart OTR/L Occupational Therapist OT                  OT Recommendation and Plan  Outcome Summary/Treatment Plan (OT)  Anticipated Discharge Disposition (OT): anticipate therapy at next level of care, skilled nursing facility  Planned Therapy Interventions (OT Eval): activity tolerance training, adaptive equipment training, BADL retraining, cognitive/visual perception retraining, functional balance retraining, IADL retraining, neuromuscular control/coordination retraining, occupation/activity based interventions, passive ROM/stretching,  patient/caregiver education/training, ROM/therapeutic exercise, strengthening exercise, transfer/mobility retraining  Therapy Frequency (OT Eval): other (see comments)(5-7 days a week )  Plan of Care Review  Plan of Care Reviewed With: patient, sibling  Plan of Care Reviewed With: patient, sibling  Outcome Summary: OT eval completed this date. Pt engaged with redirection which sister assisted. Pt was CGA for sup to sit to sup. Pt CGA for sit to stand off toilet and bed and averaged mod assist with toileting. Pt min assist with handwashing. pt SBA to don socks in bed but became SOB quickly. Pt CGA to min assist with mobility with needed HHA and unsteady. pt could benefit from further skilled OT to reach PLOF/max independence with ADL. Recommend 24/7 care and further therapy at d/c.     Outcome Measures     Row Name 10/14/19 1323 10/14/19 1300          How much help from another person do you currently need...    Turning from your back to your side while in flat bed without using bedrails?  --  3  -TA     Moving from lying on back to sitting on the side of a flat bed without bedrails?  --  3  -TA     Moving to and from a bed to a chair (including a wheelchair)?  --  3  -TA     Standing up from a chair using your arms (e.g., wheelchair, bedside chair)?  --  3  -TA     Climbing 3-5 steps with a railing?  --  3  -TA     To walk in hospital room?  --  3  -TA     AM-PAC 6 Clicks Score (PT)  --  18  -TA        How much help from another is currently needed...    Putting on and taking off regular lower body clothing?  3  -BH  --     Bathing (including washing, rinsing, and drying)  3  -BH  --     Toileting (which includes using toilet bed pan or urinal)  3  -BH  --     Putting on and taking off regular upper body clothing  3  -BH  --     Taking care of personal grooming (such as brushing teeth)  3  -BH  --     Eating meals  3  -BH  --     AM-PAC 6 Clicks Score (OT)  18  -BH  --        Functional Assessment    Outcome Measure  Options  AM-PAC 6 Clicks Daily Activity (OT)  -  AM-PAC 6 Clicks Basic Mobility (PT)  -       User Key  (r) = Recorded By, (t) = Taken By, (c) = Cosigned By    Initials Name Provider Type    Cielo Vora, OTR/L Occupational Therapist    Josefina Bahena, PTA Physical Therapy Assistant          Time Calculation:   Time Calculation- OT     Row Name 10/14/19 1514             Time Calculation-     OT Start Time  1323  -      OT Stop Time  1359  -      OT Time Calculation (min)  36 min  -      OT Received On  10/14/19  -      OT Goal Re-Cert Due Date  10/27/19  -        User Key  (r) = Recorded By, (t) = Taken By, (c) = Cosigned By    Initials Name Provider Type    Cielo Vora, OTR/L Occupational Therapist        Therapy Charges for Today     Code Description Service Date Service Provider Modifiers Qty    04194843889  OT EVAL MOD COMPLEXITY 2 10/14/2019 Cielo Stewart OTR/L GO 1               DOT [place]/PAUL  10/14/2019

## 2019-10-14 NOTE — PLAN OF CARE
Problem: Patient Care Overview  Goal: Plan of Care Review  Outcome: Ongoing (interventions implemented as appropriate)   10/14/19 1049 10/14/19 1323   Coping/Psychosocial   Plan of Care Reviewed With --  patient;sibling   Plan of Care Review   Progress improving --    OTHER   Outcome Summary pt sup<>sit with CGA, sit<>stand with CGA, pt ambulated 200` with HHA. pt would benefit from continued PT services @ D/C --
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
## 2019-10-14 NOTE — THERAPY TREATMENT NOTE
Acute Care - Physical Therapy Treatment Note  [facility]     Patient Name: Melissa Parham  : 1945  MRN: [id number]  Today's Date: 10/14/2019  Onset of Illness/Injury or Date of Surgery: 10/10/19  Date of Referral to PT: 10/11/19  Referring Physician: Renetta PRITCHARD    Admit Date: 10/10/2019    Visit Dx:    ICD-10-CM ICD-9-CM   1. Acute pyelonephritis N10 590.10   2. Impaired functional mobility and endurance Z74.09 V49.89     Patient Active Problem List   Diagnosis   • Acute pyelonephritis       Therapy Treatment    Rehabilitation Treatment Summary     Row Name 10/14/19 1049             Treatment Time/Intention    Discipline  physical therapy assistant  -TA      Document Type  therapy note (daily note)  -TA      Subjective Information  no complaints  -TA      Mode of Treatment  individual therapy;physical therapy  -TA      Patient/Family Observations  pt's sister present  -TA      Therapy Frequency (PT Clinical Impression)  daily  -TA      Patient Effort  fair  -TA      Existing Precautions/Restrictions  other (see comments) deaf, non verbal  -TA      Recorded by [TA] Josefina Alves, PTA 10/14/19 1346      Row Name 10/14/19 1049             Vital Signs    Pre Systolic BP Rehab  125  -TA      Pre Treatment Diastolic BP  64  -TA      Post Systolic BP Rehab  128  -TA      Post Treatment Diastolic BP  64  -TA      Pretreatment Heart Rate (beats/min)  66  -TA      Intratreatment Heart Rate (beats/min)  82  -TA      Posttreatment Heart Rate (beats/min)  70  -TA      Pre SpO2 (%)  96  -TA      O2 Delivery Pre Treatment  supplemental O2  -TA      Intra SpO2 (%)  94  -TA      O2 Delivery Intra Treatment  supplemental O2  -TA      Post SpO2 (%)  91  -TA      O2 Delivery Post Treatment  supplemental O2  -TA      Pre Patient Position  Supine  -TA      Post Patient Position  Supine  -TA      Recorded by [TA] Josefina Alves PTA 10/14/19 1346      Row Name 10/14/19 1049             Cognitive  Assessment/Intervention- PT/OT    Orientation Status (Cognition)  unable/difficult to assess deaf, non-verbal  -TA      Follows Commands (Cognition)  follows one step commands;50-74% accuracy;physical/tactile prompts required;repetition of directions required  -TA      Personal Safety Interventions  fall prevention program maintained;gait belt;nonskid shoes/slippers when out of bed;supervised activity  -TA      Recorded by [TA] Josefina Alves, PTA 10/14/19 1346      Row Name 10/14/19 1049             Bed Mobility Assessment/Treatment    Bed Mobility Assessment/Treatment  supine-sit;sit-supine  -TA      Supine-Sit [place] (Bed Mobility)  contact guard tactile cues  -TA      Sit-Supine [place] (Bed Mobility)  contact guard tactile cues motioning pt to lay back down  -TA      Recorded by [TA] Josefina Alves, PTA 10/14/19 1346      Row Name 10/14/19 1049             Transfer Assessment/Treatment    Transfer Assessment/Treatment  sit-stand transfer;stand-sit transfer  -TA      Recorded by [TA] Josefina Alves, PTA 10/14/19 1346      Row Name 10/14/19 1049             Sit-Stand Transfer    Sit-Stand [place] (Transfers)  contact guard  -TA      Assistive Device (Sit-Stand Transfers)  other (see comments) no AD  -TA      Recorded by [TA] Josefina Alves, PTA 10/14/19 1346      Row Name 10/14/19 1049             Stand-Sit Transfer    Stand-Sit [place] (Transfers)  contact guard  -TA      Assistive Device (Stand-Sit Transfers)  other (see comments) no AD  -TA      Recorded by [TA] Josefina Alves, PTA 10/14/19 1346      Row Name 10/14/19 1049             Gait/Stairs Assessment/Training    [place] Level (Gait)  contact guard  -TA      Assistive Device (Gait)  other (see comments) no AD, HHA  -TA2      Distance in Feet (Gait)  200`  -TA      Pattern (Gait)  step-through  -TA      Deviations/Abnormal Patterns (Gait)  sol decreased;gait speed decreased  -TA      Recorded by [TA] Josefina Alves, PTA  10/14/19 1346  [TA2] Josefina Alves, PTA 10/14/19 1347      Row Name 10/14/19 1049             Therapeutic Exercise    Lower Extremity (Therapeutic Exercise)  LAQ (long arc quad), bilateral;marching while seated  -TA      Lower Extremity Range of Motion (Therapeutic Exercise)  ankle dorsiflexion/plantar flexion, bilateral  -TA      Exercise Type (Therapeutic Exercise)  AROM (active range of motion)  -TA      Position (Therapeutic Exercise)  seated  -TA      Sets/Reps (Therapeutic Exercise)  10  -TA      Expected Outcome (Therapeutic Exercise)  facilitate normal movement patterns;improve functional stability;improve motor control;increase active range of motion  -TA      Recorded by [TA] Josefina Alves, PTA 10/14/19 1346      Row Name 10/14/19 1049             Positioning and Restraints    Pre-Treatment Position  in bed  -TA      Post Treatment Position  bed  -TA      In Bed  supine;call light within reach;with family/caregiver  -TA      Recorded by [TA] Josefina Alves, PTA 10/14/19 1346      Row Name 10/14/19 1049             Pain Scale: Numbers Pre/Post-Treatment    Pain Scale: Numbers, Pretreatment  0/10 - no pain  -TA      Pain Scale: Numbers, Post-Treatment  0/10 - no pain  -TA      Pain Location  --  -TA      Recorded by [TA] Josefina Alves, PTA 10/14/19 1346      Row Name 10/14/19 1049             Sensory Assessment/Intervention    Sensory General Assessment  other (see comments) NT due to deaf, non-verbal  -TA      Recorded by [TA] Josefina Alves, PTA 10/14/19 1346      Row Name 10/14/19 1049             Vision Assessment/Intervention    Visual Impairment/Limitations  corrective lenses full time  -TA      Recorded by [TA] Josefina Alves, PTA 10/14/19 1346      Row Name 10/14/19 1049             Outcome Summary/Treatment Plan (PT)    Daily Summary of Progress (PT)  progress towards functional goals is fair  -TA      Plan for Continued Treatment (PT)  continue  -TA      Anticipated Discharge Disposition  (PT)  anticipate therapy at next level of care  -TA      Recorded by [TA] Josefina Alves, PTA 10/14/19 6        User Key  (r) = Recorded By, (t) = Taken By, (c) = Cosigned By    Initials Name Effective Dates Discipline    Josefina Bahena, PTA 03/07/18 -  PT               Rehab Goal Summary     Row Name 10/14/19 1049             Physical Therapy Goals    Bed Mobility Goal Selection (PT)  bed mobility, PT goal 1  -TA      Transfer Goal Selection (PT)  transfer, PT goal 1  -TA      Gait Training Goal Selection (PT)  gait training, PT goal 1  -TA         Bed Mobility Goal 1 (PT)    Activity/Assistive Device (Bed Mobility Goal 1, PT)  sit to supine/supine to sit  -TA      [place] Level/Cues Needed (Bed Mobility Goal 1, PT)  conditional independence  -TA      Time Frame (Bed Mobility Goal 1, PT)  by discharge  -TA      Barriers (Bed Mobility Goal 1, PT)  deaf, nonverbal  -TA      Progress/Outcomes (Bed Mobility Goal 1, PT)  goal not met  -TA         Transfer Goal 1 (PT)    Activity/Assistive Device (Transfer Goal 1, PT)  sit-to-stand/stand-to-sit  -TA      [place] Level/Cues Needed (Transfer Goal 1, PT)  standby assist  -TA      Time Frame (Transfer Goal 1, PT)  by discharge  -TA      Barriers (Transfers Goal 1, PT)  deaf, nonverbal  -TA      Progress/Outcome (Transfer Goal 1, PT)  goal not met  -TA         Gait Training Goal 1 (PT)    Activity/Assistive Device (Gait Training Goal 1, PT)  gait (walking locomotion);other (see comments) no AD  -TA      [place] Level (Gait Training Goal 1, PT)  supervision required  -TA      Distance (Gait Goal 1, PT)  250' x 2  -TA      Time Frame (Gait Training Goal 1, PT)  by discharge  -TA      Barriers (Gait Training Goal 1, PT)  deaf, nonverbal  -TA      Progress/Outcome (Gait Training Goal 1, PT)  goal not met  -TA        User Key  (r) = Recorded By, (t) = Taken By, (c) = Cosigned By    Initials Name Provider Type Discipline    Josefina Bahena, PTA Physical  Therapy Assistant PT              PT Recommendation and Plan  Anticipated Discharge Disposition (PT): anticipate therapy at next level of care  Therapy Frequency (PT Clinical Impression): daily  Outcome Summary/Treatment Plan (PT)  Daily Summary of Progress (PT): progress towards functional goals is fair  Plan for Continued Treatment (PT): continue  Anticipated Discharge Disposition (PT): anticipate therapy at next level of care  Progress: improving  Outcome Summary: pt sup<>sit with CGA, sit<>stand with CGA, pt ambulated 200` with HHA. pt would benefit from continued PT services @ D/C  Outcome Measures     Row Name 10/14/19 1300 10/11/19 1513          How much help from another person do you currently need...    Turning from your back to your side while in flat bed without using bedrails?  3  -TA  3  -BS     Moving from lying on back to sitting on the side of a flat bed without bedrails?  3  -TA  3  -BS     Moving to and from a bed to a chair (including a wheelchair)?  3  -TA  3  -BS     Standing up from a chair using your arms (e.g., wheelchair, bedside chair)?  3  -TA  3  -BS     Climbing 3-5 steps with a railing?  3  -TA  3  -BS     To walk in hospital room?  3  -TA  3  -BS     AM-PAC 6 Clicks Score (PT)  18  -TA  18  -BS        Functional Assessment    Outcome Measure Options  AM-PAC 6 Clicks Basic Mobility (PT)  -TA  AM-PAC 6 Clicks Basic Mobility (PT)  -BS       User Key  (r) = Recorded By, (t) = Taken By, (c) = Cosigned By    Initials Name Provider Type    Josefina Bahena, PTA Physical Therapy Assistant    BS Julio Yee, PT Physical Therapist         Time Calculation:   PT Charges     Row Name 10/14/19 1348             Time Calculation    Start Time  1049  -TA      Stop Time  1128  -TA      Time Calculation (min)  39 min  -TA      PT Received On  10/14/19  -TA         Time Calculation- PT    Total Timed Code Minutes- PT  39 minute(s)  -TA        User Key  (r) = Recorded By, (t) = Taken By, (c) =  Cosigned By    Initials Name Provider Type    TA Josefina Alves PTA Physical Therapy Assistant        Therapy Charges for Today     Code Description Service Date Service Provider Modifiers Qty    24732332178 HC GAIT TRAINING EA 15 MIN 10/14/2019 Josefina Alves, WILLIAM GP 1    49540292622 HC PT THERAPEUTIC ACT EA 15 MIN 10/14/2019 Josefina Alves, WILLIAM GP 1    58314239912 HC PT THER PROC EA 15 MIN 10/14/2019 Josefina Alves, WILLIAM GP 1          PT G-Codes  Outcome Measure Options: AM-PAC 6 Clicks Basic Mobility (PT)  AM-PAC 6 Clicks Score (PT): 18    Josefina Alves PTA  10/14/2019

## 2019-10-15 LAB
ALBUMIN SERPL-MCNC: 2.7 G/DL (ref 3.5–5.2)
ALBUMIN/GLOB SERPL: 0.8 G/DL
ALP SERPL-CCNC: 108 U/L (ref 39–117)
ALT SERPL W P-5'-P-CCNC: 29 U/L (ref 1–33)
ANION GAP SERPL CALCULATED.3IONS-SCNC: 11 MMOL/L (ref 5–15)
AST SERPL-CCNC: 30 U/L (ref 1–32)
BASOPHILS # BLD AUTO: 0.02 10*3/MM3 (ref 0–0.2)
BASOPHILS NFR BLD AUTO: 0.3 % (ref 0–1.5)
BILIRUB SERPL-MCNC: 0.2 MG/DL (ref 0.2–1.2)
BUN BLD-MCNC: 19 MG/DL (ref 8–23)
BUN/CREAT SERPL: 17.1 (ref 7–25)
CALCIUM SPEC-SCNC: 8.9 MG/DL (ref 8.6–10.5)
CHLORIDE SERPL-SCNC: 104 MMOL/L (ref 98–107)
CO2 SERPL-SCNC: 28 MMOL/L (ref 22–29)
CREAT BLD-MCNC: 1.11 MG/DL (ref 0.57–1)
DEPRECATED RDW RBC AUTO: 39.2 FL (ref 37–54)
EOSINOPHIL # BLD AUTO: 0.22 10*3/MM3 (ref 0–0.4)
EOSINOPHIL NFR BLD AUTO: 3.1 % (ref 0.3–6.2)
ERYTHROCYTE [DISTWIDTH] IN BLOOD BY AUTOMATED COUNT: 13.2 % (ref 12.3–15.4)
GFR SERPL CREATININE-BSD FRML MDRD: 48 ML/MIN/1.73
GLOBULIN UR ELPH-MCNC: 3.5 GM/DL
GLUCOSE BLD-MCNC: 85 MG/DL (ref 65–99)
GLUCOSE BLDC GLUCOMTR-MCNC: 102 MG/DL (ref 70–130)
GLUCOSE BLDC GLUCOMTR-MCNC: 144 MG/DL (ref 70–130)
GLUCOSE BLDC GLUCOMTR-MCNC: 192 MG/DL (ref 70–130)
GLUCOSE BLDC GLUCOMTR-MCNC: 90 MG/DL (ref 70–130)
HCT VFR BLD AUTO: 29 % (ref 34–46.6)
HGB BLD-MCNC: 9.7 G/DL (ref 12–15.9)
IMM GRANULOCYTES # BLD AUTO: 0.16 10*3/MM3 (ref 0–0.05)
IMM GRANULOCYTES NFR BLD AUTO: 2.3 % (ref 0–0.5)
LYMPHOCYTES # BLD AUTO: 1.28 10*3/MM3 (ref 0.7–3.1)
LYMPHOCYTES NFR BLD AUTO: 18.2 % (ref 19.6–45.3)
MCH RBC QN AUTO: 27.1 PG (ref 26.6–33)
MCHC RBC AUTO-ENTMCNC: 33.4 G/DL (ref 31.5–35.7)
MCV RBC AUTO: 81 FL (ref 79–97)
MONOCYTES # BLD AUTO: 0.88 10*3/MM3 (ref 0.1–0.9)
MONOCYTES NFR BLD AUTO: 12.5 % (ref 5–12)
NEUTROPHILS # BLD AUTO: 4.46 10*3/MM3 (ref 1.7–7)
NEUTROPHILS NFR BLD AUTO: 63.6 % (ref 42.7–76)
NRBC BLD AUTO-RTO: 0 /100 WBC (ref 0–0.2)
PLATELET # BLD AUTO: 210 10*3/MM3 (ref 140–450)
PMV BLD AUTO: 9.4 FL (ref 6–12)
POTASSIUM BLD-SCNC: 3 MMOL/L (ref 3.5–5.2)
PROT SERPL-MCNC: 6.2 G/DL (ref 6–8.5)
RBC # BLD AUTO: 3.58 10*6/MM3 (ref 3.77–5.28)
SODIUM BLD-SCNC: 143 MMOL/L (ref 136–145)
WBC NRBC COR # BLD: 7.02 10*3/MM3 (ref 3.4–10.8)

## 2019-10-15 PROCEDURE — 97530 THERAPEUTIC ACTIVITIES: CPT

## 2019-10-15 PROCEDURE — 82962 GLUCOSE BLOOD TEST: CPT

## 2019-10-15 PROCEDURE — 25010000002 CEFEPIME PER 500 MG: Performed by: NURSE PRACTITIONER

## 2019-10-15 PROCEDURE — 97116 GAIT TRAINING THERAPY: CPT

## 2019-10-15 PROCEDURE — 25010000002 VANCOMYCIN 1 G RECONSTITUTED SOLUTION: Performed by: NURSE PRACTITIONER

## 2019-10-15 PROCEDURE — 25010000002 FUROSEMIDE PER 20 MG: Performed by: NURSE PRACTITIONER

## 2019-10-15 PROCEDURE — 97535 SELF CARE MNGMENT TRAINING: CPT

## 2019-10-15 PROCEDURE — 63710000001 INSULIN ASPART PER 5 UNITS: Performed by: NURSE PRACTITIONER

## 2019-10-15 PROCEDURE — 97110 THERAPEUTIC EXERCISES: CPT

## 2019-10-15 PROCEDURE — 85025 COMPLETE CBC W/AUTO DIFF WBC: CPT | Performed by: NURSE PRACTITIONER

## 2019-10-15 PROCEDURE — 63710000001 INSULIN DETEMIR PER 5 UNITS: Performed by: NURSE PRACTITIONER

## 2019-10-15 PROCEDURE — 25010000002 HEPARIN (PORCINE) PER 1000 UNITS: Performed by: NURSE PRACTITIONER

## 2019-10-15 PROCEDURE — 80053 COMPREHEN METABOLIC PANEL: CPT | Performed by: NURSE PRACTITIONER

## 2019-10-15 RX ORDER — POTASSIUM CHLORIDE 750 MG/1
40 CAPSULE, EXTENDED RELEASE ORAL AS NEEDED
Status: DISCONTINUED | OUTPATIENT
Start: 2019-10-15 | End: 2019-10-17 | Stop reason: HOSPADM

## 2019-10-15 RX ORDER — POTASSIUM CHLORIDE 7.45 MG/ML
10 INJECTION INTRAVENOUS
Status: DISCONTINUED | OUTPATIENT
Start: 2019-10-15 | End: 2019-10-17 | Stop reason: HOSPADM

## 2019-10-15 RX ORDER — POTASSIUM CHLORIDE 1.5 G/1.77G
40 POWDER, FOR SOLUTION ORAL AS NEEDED
Status: DISCONTINUED | OUTPATIENT
Start: 2019-10-15 | End: 2019-10-17 | Stop reason: HOSPADM

## 2019-10-15 RX ADMIN — SODIUM CHLORIDE, PRESERVATIVE FREE 10 ML: 5 INJECTION INTRAVENOUS at 20:06

## 2019-10-15 RX ADMIN — HYDROCORTISONE: 1 CREAM TOPICAL at 09:03

## 2019-10-15 RX ADMIN — RISPERIDONE 0.25 MG: 0.25 TABLET ORAL at 08:57

## 2019-10-15 RX ADMIN — VANCOMYCIN HYDROCHLORIDE 1000 MG: 1 INJECTION, POWDER, LYOPHILIZED, FOR SOLUTION INTRAVENOUS at 13:57

## 2019-10-15 RX ADMIN — CLOPIDOGREL BISULFATE 75 MG: 75 TABLET ORAL at 08:57

## 2019-10-15 RX ADMIN — CETIRIZINE HYDROCHLORIDE 5 MG: 5 TABLET ORAL at 08:56

## 2019-10-15 RX ADMIN — HEPARIN SODIUM 5000 UNITS: 5000 INJECTION INTRAVENOUS; SUBCUTANEOUS at 08:56

## 2019-10-15 RX ADMIN — ATORVASTATIN CALCIUM 10 MG: 10 TABLET, FILM COATED ORAL at 20:04

## 2019-10-15 RX ADMIN — FUROSEMIDE 20 MG: 10 INJECTION, SOLUTION INTRAVENOUS at 15:29

## 2019-10-15 RX ADMIN — SERTRALINE HYDROCHLORIDE 25 MG: 25 TABLET ORAL at 08:57

## 2019-10-15 RX ADMIN — METOPROLOL TARTRATE 50 MG: 50 TABLET ORAL at 08:57

## 2019-10-15 RX ADMIN — INSULIN ASPART 2 UNITS: 100 INJECTION, SOLUTION INTRAVENOUS; SUBCUTANEOUS at 11:43

## 2019-10-15 RX ADMIN — HEPARIN SODIUM 5000 UNITS: 5000 INJECTION INTRAVENOUS; SUBCUTANEOUS at 20:04

## 2019-10-15 RX ADMIN — CEFEPIME HYDROCHLORIDE 2 G: 2 INJECTION, POWDER, FOR SOLUTION INTRAVENOUS at 05:11

## 2019-10-15 RX ADMIN — CLONAZEPAM 0.5 MG: 0.5 TABLET ORAL at 08:57

## 2019-10-15 RX ADMIN — HYDROCORTISONE: 1 CREAM TOPICAL at 20:06

## 2019-10-15 RX ADMIN — POTASSIUM CHLORIDE 40 MEQ: 1.5 POWDER, FOR SOLUTION ORAL at 20:04

## 2019-10-15 RX ADMIN — HYDROCODONE BITARTRATE AND ACETAMINOPHEN 1 TABLET: 7.5; 325 TABLET ORAL at 15:29

## 2019-10-15 RX ADMIN — CLONAZEPAM 0.5 MG: 0.5 TABLET ORAL at 20:04

## 2019-10-15 RX ADMIN — SODIUM CHLORIDE, PRESERVATIVE FREE 10 ML: 5 INJECTION INTRAVENOUS at 08:59

## 2019-10-15 RX ADMIN — ACETAMINOPHEN 650 MG: 325 TABLET, FILM COATED ORAL at 06:32

## 2019-10-15 RX ADMIN — POTASSIUM CHLORIDE 40 MEQ: 1.5 POWDER, FOR SOLUTION ORAL at 11:42

## 2019-10-15 RX ADMIN — FUROSEMIDE 20 MG: 10 INJECTION, SOLUTION INTRAVENOUS at 03:06

## 2019-10-15 RX ADMIN — POTASSIUM CHLORIDE 40 MEQ: 1.5 POWDER, FOR SOLUTION ORAL at 18:59

## 2019-10-15 RX ADMIN — INSULIN DETEMIR 15 UNITS: 100 INJECTION, SOLUTION SUBCUTANEOUS at 08:56

## 2019-10-15 RX ADMIN — PANTOPRAZOLE SODIUM 40 MG: 40 TABLET, DELAYED RELEASE ORAL at 05:11

## 2019-10-15 RX ADMIN — HYDROCODONE BITARTRATE AND ACETAMINOPHEN 1 TABLET: 7.5; 325 TABLET ORAL at 08:57

## 2019-10-15 NOTE — PROGRESS NOTES
"Pharmacokinetics by Pharmacy - Vancomycin Initial Consult    Melissa Parham is a 74 y.o. female being re-initiated on vancomycin for pneumonia/persistent fevers. Patient is also receiving cefepime.    Objective:     [Ht: 154.9 cm (61\"); Wt: 66.4 kg (146 lb 4.8 oz)]     Lab Results   Component Value Date    WBC 7.02 10/15/2019    WBC 6.70 10/14/2019    WBC 7.85 10/13/2019      Lab Results   Component Value Date    LACTATE 1.5 10/10/2019      Temp Readings from Last 1 Encounters:   10/15/19 98.4 °F (36.9 °C)     Estimated Creatinine Clearance: 38.7 mL/min (A) (by C-G formula based on SCr of 1.11 mg/dL (H)).   Lab Results   Component Value Date    CREATININE 1.11 (H) 10/15/2019    CREATININE 1.32 (H) 10/14/2019    CREATININE 1.38 (H) 10/13/2019       Baseline culture results:  Microbiology Results (last 10 days)       Procedure Component Value - Date/Time    MRSA Screen, PCR - Swab, Nares [[id number]]  (Normal) Collected:  10/13/19 0116    Lab Status:  Final result Specimen:  Swab from Nares Updated:  10/13/19 0507     MRSA, PCR Negative    Narrative:       Performed by real-time polymerase chain reaction (qPCR).    Blood Culture - Blood, Hand, Left [[id number]] Collected:  10/12/19 1922    Lab Status:  Preliminary result Specimen:  Blood from Hand, Left Updated:  10/14/19 2004     Blood Culture No growth at 2 days    Blood Culture - Blood, Hand, Left [[id number]] Collected:  10/12/19 1922    Lab Status:  Preliminary result Specimen:  Blood from Hand, Left Updated:  10/14/19 2004     Blood Culture No growth at 2 days    Respiratory Panel, PCR - Swab, Nasopharynx [[id number]]  (Normal) Collected:  10/11/19 1816    Lab Status:  Final result Specimen:  Swab from Nasopharynx Updated:  10/11/19 2008     ADENOVIRUS, PCR Not Detected     Coronavirus 229E Not Detected     Coronavirus HKU1 Not Detected     Coronavirus NL63 Not Detected     Coronavirus OC43 Not Detected     Human Metapneumovirus Not Detected     Human " Rhinovirus/Enterovirus Not Detected     Influenza B PCR Not Detected     Parainfluenza Virus 1 Not Detected     Parainfluenza Virus 2 Not Detected     Parainfluenza Virus 3 Not Detected     Parainfluenza Virus 4 Not Detected     Bordetella pertussis pcr Not Detected     Influenza A H1 2009 PCR Not Detected     Chlamydophila pneumoniae PCR Not Detected     Mycoplasma pneumo by PCR Not Detected     Influenza A PCR Not Detected     Influenza A H3 Not Detected     Influenza A H1 Not Detected     RSV, PCR Not Detected    Legionella Antigen, Urine - Urine, Urine, Clean Catch [[id number]]  (Normal) Collected:  10/11/19 1632    Lab Status:  Final result Specimen:  Urine, Clean Catch Updated:  10/11/19 1709     LEGIONELLA ANTIGEN, URINE Negative    S. Pneumo Ag Urine or CSF - Urine, Urine, Clean Catch [[id number]]  (Normal) Collected:  10/11/19 1632    Lab Status:  Final result Specimen:  Urine, Clean Catch Updated:  10/11/19 1709     Strep Pneumo Ag Negative    Influenza Antigen, Rapid - Swab, Nasopharynx [[id number]]  (Normal) Collected:  10/10/19 1546    Lab Status:  Final result Specimen:  Swab from Nasopharynx Updated:  10/10/19 1622     Influenza A Ag, EIA Negative     Influenza B Ag, EIA Negative    Urine Culture - Urine, Urine, Clean Catch [[id number]]  (Abnormal)  (Susceptibility) Collected:  10/10/19 1533    Lab Status:  Final result Specimen:  Urine, Clean Catch Updated:  10/12/19 0347     Urine Culture >100,000 CFU/mL Escherichia coli    Susceptibility        Escherichia coli     TABATHA     Ampicillin Resistant     Ampicillin + Sulbactam Intermediate     Cefazolin Susceptible     Cefepime Susceptible     Ceftazidime Susceptible     Ceftriaxone Susceptible     Gentamicin Susceptible     Levofloxacin Susceptible     Nitrofurantoin Susceptible     Piperacillin + Tazobactam Susceptible     Tetracycline Resistant     Trimethoprim + Sulfamethoxazole Resistant                                 Assessment  WBC WNL  SCr trending  down, WNL  Febrile event today     10/10 urine - E. Coli  10/10 negative influenza, strep pneumo, legionella  10/11 negative respiratory panel  10/12 blood - NG2d  10/13 negative MRSA PCR       Previous vancomycin 750 mg IV daily predicted an AUC of approx 520.  SCr has improved.  Will re-initiate with vancomycin 1000 mg IV q24h.  Last dose of vancomycin 750 mg IV was 10/13 2123.  Reviewed patient with attending today, re-initiating vancomycin for persistent fevers.    Utilizing AUC dosing  Goal AUC for pneumonia: 400-600     Plan:  1. Vancomycin 1000 mg IV q24h  2. Peak 10/16 38541, Trough 10/17 1200  3. Pharmacy will monitor renal function and adjust dose accordingly.    Wenceslao Squires [facility]  10/15/19 11:36 AM
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
## 2019-10-15 NOTE — PLAN OF CARE
Problem: Patient Care Overview  Goal: Plan of Care Review  Outcome: Ongoing (interventions implemented as appropriate)   10/14/19 1721   Coping/Psychosocial   Plan of Care Reviewed With patient;sibling;caregiver   Plan of Care Review   Progress improving     Goal: Individualization and Mutuality  Outcome: Ongoing (interventions implemented as appropriate)      Problem: Sepsis/Septic Shock (Adult)  Goal: Signs and Symptoms of Listed Potential Problems Will be Absent, Minimized or Managed (Sepsis/Septic Shock)  Outcome: Ongoing (interventions implemented as appropriate)      Problem: Fall Risk (Adult)  Goal: Absence of Fall  Outcome: Ongoing (interventions implemented as appropriate)      Problem: Skin Injury Risk (Adult)  Goal: Skin Health and Integrity  Outcome: Ongoing (interventions implemented as appropriate)      Problem: Hearing Impairment/Deaf (Adult,Obstetrics,Pediatric)  Goal: Enhanced Communication  Outcome: Ongoing (interventions implemented as appropriate)      Problem: Urinary Tract Infection (Adult)  Goal: Signs and Symptoms of Listed Potential Problems Will be Absent, Minimized or Managed (Urinary Tract Infection)  Outcome: Ongoing (interventions implemented as appropriate)

## 2019-10-15 NOTE — THERAPY TREATMENT NOTE
Acute Care - Physical Therapy Treatment Note  [facility]     Patient Name: Melissa Parham  : 1945  MRN: [id number]  Today's Date: 10/15/2019  Onset of Illness/Injury or Date of Surgery: 10/10/19  Date of Referral to PT: 10/11/19  Referring Physician: Renetta PRITCHARD    Admit Date: 10/10/2019    Visit Dx:    ICD-10-CM ICD-9-CM   1. Acute pyelonephritis N10 590.10   2. Impaired functional mobility and endurance Z74.09 V49.89   3. Impaired mobility and ADLs Z74.09 799.89     Patient Active Problem List   Diagnosis   • Acute pyelonephritis       Therapy Treatment    Rehabilitation Treatment Summary     Row Name 10/15/19 1415 10/15/19 1240          Treatment Time/Intention    Discipline  physical therapy assistant  -TA  occupational therapy assistant  -LW     Document Type  therapy note (daily note)  -TA  therapy note (daily note)  -LW     Subjective Information  no complaints  -TA  no complaints  -LW     Mode of Treatment  individual therapy;physical therapy  -TA  individual therapy;occupational therapy  -LW     Patient/Family Observations  --  Pt sleeping in [employer]. Sister present.  -LW     Care Plan Review  --  care plan/treatment goals reviewed  -LW     Therapy Frequency (PT Clinical Impression)  daily  -TA  --     Total Minutes, Occupational Therapy Treatment  --  60  -LW     Therapy Frequency (OT Eval)  --  other (see comments) 5-7 days a week  -LW     Patient Effort  fair  -TA  good  -LW     Existing Precautions/Restrictions  other (see comments) deaf, non verbal  -TA  fall;other (see comments) deaf, non verbal  -LW     Equipment Issued to Patient  --  gait belt  -LW     Recorded by [TA] Josefina Alves, WILLIAM 10/15/19 1687 [LW] Madeline Young, KAT/L 10/15/19 1601     Row Name 10/15/19 1415 10/15/19 1240          Vital Signs    Pre Systolic BP Rehab  134  -TA  120  -LW     Pre Treatment Diastolic BP  64  -TA  56  -LW     Post Systolic BP Rehab  156  -TA  --     Post Treatment Diastolic BP   77  -TA  --     Pretreatment Heart Rate (beats/min)  78  -TA  66  -LW     Intratreatment Heart Rate (beats/min)  75  -TA  63  -LW     Posttreatment Heart Rate (beats/min)  78  -TA  69  -LW     Pre SpO2 (%)  95  -TA  96  -LW     O2 Delivery Pre Treatment  room air  -TA  room air  -LW     Intra SpO2 (%)  96  -TA  95  -LW     O2 Delivery Intra Treatment  room air  -TA  room air  -LW     Post SpO2 (%)  94  -TA  96  -LW     O2 Delivery Post Treatment  room air  -TA  room air  -LW     Pre Patient Position  Supine  -TA  Sitting  -LW     Intra Patient Position  --  Sitting  -LW     Post Patient Position  Supine  -TA  Sitting  -LW     Recorded by [TA] Josefina Alves, PTA 10/15/19 1747 [LW] Madeline Young STEPHENS/L 10/15/19 1601     Row Name 10/15/19 1415 10/15/19 1240          Cognitive Assessment/Intervention- PT/OT    Affect/Mental Status (Cognitive)  --  WFL  -LW     Orientation Status (Cognition)  unable/difficult to assess deaf, non-verbal  -TA  unable/difficult to assess  -LW     Follows Commands (Cognition)  follows one step commands;50-74% accuracy;physical/tactile prompts required;repetition of directions required  -TA  follows one step commands;50-74% accuracy;repetition of directions required;physical/tactile prompts required;delayed response/completion;increased processing time needed;initiation impaired  -LW     Safety Deficit (Cognitive)  --  moderate deficit  -LW     Personal Safety Interventions  fall prevention program maintained;gait belt;nonskid shoes/slippers when out of bed;supervised activity  -TA  fall prevention program maintained;gait belt;nonskid shoes/slippers when out of bed  -LW     Recorded by [TA] Josefina Alves PTA 10/15/19 1747 [LW] Madeline Young STEPHENS/L 10/15/19 1601     Row Name 10/15/19 1240             Safety Issues, Functional Mobility    Safety Issues Affecting Function (Mobility)  ability to follow commands  -LW      Impairments Affecting Function (Mobility)   balance;coordination;endurance/activity tolerance  -LW      Recorded by [LW] Madeline Young STEPHENS/L 10/15/19 1601      Row Name 10/15/19 1415             Bed Mobility Assessment/Treatment    Bed Mobility Assessment/Treatment  supine-sit;sit-supine  -TA      Supine-Sit [place] (Bed Mobility)  contact guard tactile cues  -TA      Sit-Supine [place] (Bed Mobility)  contact guard tactile cues motioning pt to lay back down  -TA      Recorded by [TA] Josefina Alves, PTA 10/15/19 1747      Row Name 10/15/19 1240             Functional Mobility    Functional Mobility- Ind. Level  contact guard assist  -LW      Functional Mobility- Device  other (see comments) No AD HHA  -LW      Recorded by [LW] Madeline Young STEPHENS/L 10/15/19 1601      Row Name 10/15/19 1415 10/15/19 1240          Transfer Assessment/Treatment    Transfer Assessment/Treatment  sit-stand transfer;stand-sit transfer  -TA  sit-stand transfer;stand-sit transfer;toilet transfer;shower transfer  -LW     Recorded by [TA] Josefina Alves, PTA 10/15/19 1747 [LW] Madeline Young STEPHENS/L 10/15/19 1601     Row Name 10/15/19 1415 10/15/19 1240          Sit-Stand Transfer    Sit-Stand [place] (Transfers)  contact guard  -TA  contact guard  -LW     Assistive Device (Sit-Stand Transfers)  other (see comments) no AD  -TA  other (see comments) No AD  -LW     Recorded by [TA] Josefina Alves, PTA 10/15/19 1747 [LW] Madeline Young STEPHENS/L 10/15/19 1601     Row Name 10/15/19 1415 10/15/19 1240          Stand-Sit Transfer    Stand-Sit [place] (Transfers)  contact guard  -TA  contact guard  -LW     Assistive Device (Stand-Sit Transfers)  other (see comments) no AD  -TA  other (see comments) No AD  -LW     Recorded by [TA] Josefina Alves, PTA 10/15/19 1747 [LW] Madeline Young STEPHENS/L 10/15/19 1601     Row Name 10/15/19 1240             Toilet Transfer    Type (Toilet Transfer)  sit-stand;stand-sit  -LW      [place] Level (Toilet Transfer)   contact guard;other (see comments) No AD  -LW      Assistive Device (Toilet Transfer)  grab bars/safety frame  -LW      Recorded by [LW] Madeline Young COTA/L 10/15/19 1601      Row Name 10/15/19 1240             Shower Transfer    Type (Shower Transfer)  sit-stand;stand-sit  -LW      [place] Level (Shower Transfer)  contact guard;other (see comments) No AD  -LW      Assistive Device (Shower Transfer)  grab bars/tub rail;shower chair  -LW      Recorded by [LW] Madeline Young COTA/L 10/15/19 1601      Row Name 10/15/19 1415             Gait/Stairs Assessment/Training    [place] Level (Gait)  contact guard  -TA      Assistive Device (Gait)  other (see comments) no AD, HHA  -TA      Distance in Feet (Gait)  3001  -TA      Pattern (Gait)  step-through  -TA      Deviations/Abnormal Patterns (Gait)  sol decreased;gait speed decreased  -TA      Recorded by [TA] Josefina Alves, PTA 10/15/19 1747      Row Name 10/15/19 1240             ADL Assessment/Intervention    BADL Assessment/Intervention  bathing;upper body dressing;lower body dressing;clothing fastener management;grooming;toileting  -LW      Recorded by [LW] Madeline Young COTA/L 10/15/19 1601      Row Name 10/15/19 1240             Bathing Assessment/Intervention    Bathing [place] Level  bathing skills;lower body;upper body;contact guard assist  -LW      Assistive Devices (Bathing)  bath mitt;shower chair;grab bars/tub rail;long-handled sponge  -LW      Bathing Position  supported sitting  -LW      Recorded by [LW] Madeline Young COTA/L 10/15/19 1601      Row Name 10/15/19 1240             Upper Body Dressing Assessment/Training    Upper Body Dressing [place] Level  upper body dressing skills;doff;don;contact guard assist  -LW      Upper Body Dressing Position  edge of bed sitting  -LW      Recorded by [LW] Madeline Young COTA/L 10/15/19 1601      Row Name 10/15/19 1240             Lower Body Dressing Assessment/Training     Lower Body Dressing [place] Level  lower body dressing skills;doff;don;socks;contact guard assist  -LW      Lower Body Dressing Position  edge of bed sitting  -LW      Recorded by [LW] Madeline Young STEPHENS/L 10/15/19 1601      Row Name 10/15/19 1240             Grooming Assessment/Training    [place] Level (Grooming)  grooming skills;hair care, combing/brushing;contact guard assist  -LW      Grooming Position  edge of bed sitting  -LW      Recorded by [LW] Madeline Young STEPHENS/L 10/15/19 1601      Row Name 10/15/19 1240             Toileting Assessment/Training    [place] Level (Toileting)  toileting skills;adjust/manage clothing;perform perineal hygiene;maximum assist (25% patient effort)  -LW      Assistive Devices (Toileting)  commode;grab bar/safety frame  -LW      Toileting Position  supported sitting  -LW      Recorded by [LW] Madeline Young COTA/L 10/15/19 1601      Row Name 10/15/19 1240             BADL Safety/Performance    Impairments, BADL Safety/Performance  balance;endurance/activity tolerance  -LW      Recorded by [LW] Madeline Young STEPHENS/L 10/15/19 1601      Row Name 10/15/19 1416             Therapeutic Exercise    Lower Extremity (Therapeutic Exercise)  LAQ (long arc quad), bilateral;heel slides, bilateral  -TA      Lower Extremity Range of Motion (Therapeutic Exercise)  hip abduction/adduction, bilateral;ankle dorsiflexion/plantar flexion, bilateral  -TA      Exercise Type (Therapeutic Exercise)  AROM (active range of motion)  -TA      Position (Therapeutic Exercise)  seated;supine  -TA      Sets/Reps (Therapeutic Exercise)  10  -TA      Expected Outcome (Therapeutic Exercise)  facilitate normal movement patterns;improve functional stability;improve motor control;improve postural control  -TA      Recorded by [TA] Josefina Alves, PTA 10/15/19 8637      Row Name 10/15/19 1240             Positioning and Restraints    Pre-Treatment Position  sitting in chair/recliner  -LW       Post Treatment Position  bed  -LW      In Bed  supine;call light within reach;encouraged to call for assist;exit alarm on;with family/caregiver  -LW      Recorded by [LW] Madeline Young COTA/L 10/15/19 1601      Row Name 10/15/19 1415 10/15/19 1240          Pain Scale: Numbers Pre/Post-Treatment    Pain Scale: Numbers, Pretreatment  0/10 - no pain  -TA  -- Pt shows no s/s of pain  -LW     Pain Scale: Numbers, Post-Treatment  0/10 - no pain  -TA  -- Pt shows no s/s of pain  -LW     Recorded by [TA] Josefina Alves, PTA 10/15/19 1747 [LW] Madeline Young STEPHENS/L 10/15/19 1601     Row Name 10/15/19 1415             Sensory Assessment/Intervention    Sensory General Assessment  other (see comments) NT due to deaf, non-verbal  -TA      Recorded by [TA] Josefina Alves, PTA 10/15/19 1747      Row Name 10/15/19 1414             Vision Assessment/Intervention    Visual Impairment/Limitations  corrective lenses full time  -TA      Recorded by [TA] Josefina Alves, PTA 10/15/19 1747      Row Name 10/15/19 1240             Coping    Observed Emotional State  calm;cooperative  -LW      Verbalized Emotional State  acceptance  -LW      Recorded by [LW] Madeline Young COTA/L 10/15/19 1601      Row Name 10/15/19 1240             Plan of Care Review    Plan of Care Reviewed With  patient;sibling  -LW      Recorded by [LW] Madeline Young STEPHENS/L 10/15/19 1601      Row Name 10/15/19 1240             Outcome Summary/Treatment Plan (OT)    Daily Summary of Progress (OT)  progress toward functional goals is good  -LW      Plan for Continued Treatment (OT)  Continue POC  -LW      Anticipated Discharge Disposition (OT)  anticipate therapy at next level of care  -LW      Recorded by [LW] Madeline Young COTA/L 10/15/19 1601      Row Name 10/15/19 1415             Outcome Summary/Treatment Plan (PT)    Daily Summary of Progress (PT)  progress toward functional goals is good  -TA      Plan for Continued Treatment (PT)  continue   -TA      Anticipated Discharge Disposition (PT)  home with 24/7 care  -TA      Recorded by [TA] Master Alvesa FILI, PTA 10/15/19 8509        User Key  (r) = Recorded By, (t) = Taken By, (c) = Cosigned By    Initials Name Effective Dates Discipline    TA Josefina Alves, PTA 03/07/18 -  PT    LW Madelien Young, STEPHENS/L 03/07/18 -  OT               Rehab Goal Summary     Row Name 10/15/19 1415 10/15/19 1240          Physical Therapy Goals    Bed Mobility Goal Selection (PT)  bed mobility, PT goal 1  -TA  --     Transfer Goal Selection (PT)  transfer, PT goal 1  -TA  --     Gait Training Goal Selection (PT)  gait training, PT goal 1  -TA  --        Bed Mobility Goal 1 (PT)    Activity/Assistive Device (Bed Mobility Goal 1, PT)  sit to supine/supine to sit  -TA  --     [place] Level/Cues Needed (Bed Mobility Goal 1, PT)  conditional independence  -TA  --     Time Frame (Bed Mobility Goal 1, PT)  by discharge  -TA  --     Barriers (Bed Mobility Goal 1, PT)  deaf, nonverbal  -TA  --     Progress/Outcomes (Bed Mobility Goal 1, PT)  goal not met  -TA  --        Transfer Goal 1 (PT)    Activity/Assistive Device (Transfer Goal 1, PT)  sit-to-stand/stand-to-sit  -TA  --     [place] Level/Cues Needed (Transfer Goal 1, PT)  standby assist  -TA  --     Time Frame (Transfer Goal 1, PT)  by discharge  -TA  --     Barriers (Transfers Goal 1, PT)  deaf, nonverbal  -TA  --     Progress/Outcome (Transfer Goal 1, PT)  goal not met  -TA  --        Gait Training Goal 1 (PT)    Activity/Assistive Device (Gait Training Goal 1, PT)  gait (walking locomotion);other (see comments) no AD  -TA  --     [place] Level (Gait Training Goal 1, PT)  supervision required  -TA  --     Distance (Gait Goal 1, PT)  250' x 2  -TA  --     Time Frame (Gait Training Goal 1, PT)  by discharge  -TA  --     Barriers (Gait Training Goal 1, PT)  deaf, nonverbal  -TA  --     Progress/Outcome (Gait Training Goal 1, PT)  goal not met  -TA  --         Occupational Therapy Goals    Transfer Goal Selection (OT)  --  transfer, OT goal 1  -LW     Bathing Goal Selection (OT)  --  bathing, OT goal 1  -LW     Dressing Goal Selection (OT)  --  dressing, OT goal 1  -LW     Toileting Goal Selection (OT)  --  toileting, OT goal 1  -LW     Grooming Goal Selection (OT)  --  grooming, OT goal 1  -LW     Self-Feeding Goal Selection (OT)  --  self feeding, OT goal 1  -LW     Functional Mobility Goal Selection (OT)  --  functional mobility, OT goal 1  -LW        Transfer Goal 1 (OT)    Activity/Assistive Device (Transfer Goal 1, OT)  --  toilet  -LW     [place] Level/Cues Needed (Transfer Goal 1, OT)  --  standby assist  -LW     Time Frame (Transfer Goal 1, OT)  --  long term goal (LTG);by discharge  -LW     Progress/Outcome (Transfer Goal 1, OT)  --  goal met  (Significant)   -LW        Bathing Goal 1 (OT)    Activity/Assistive Device (Bathing Goal 1, OT)  --  bathing skills, all  -LW     [place] Level/Cues Needed (Bathing Goal 1, OT)  --  set-up required;standby assist;tactile cues required  -LW     Time Frame (Bathing Goal 1, OT)  --  long term goal (LTG);by discharge  -LW     Progress/Outcomes (Bathing Goal 1, OT)  --  goal met  (Significant)   -LW        Dressing Goal 1 (OT)    Activity/Assistive Device (Dressing Goal 1, OT)  --  dressing skills, all  -LW     [place]/Cues Needed (Dressing Goal 1, OT)  --  set-up required;tactile cues required;standby assist  -LW     Time Frame (Dressing Goal 1, OT)  --  long term goal (LTG);by discharge  -LW     Progress/Outcome (Dressing Goal 1, OT)  --  goal met  (Significant)   -LW        Toileting Goal 1 (OT)    Activity/Device (Toileting Goal 1, OT)  --  toileting skills, all  -LW     [place] Level/Cues Needed (Toileting Goal 1, OT)  --  set-up required;tactile cues required;standby assist  -LW     Time Frame (Toileting Goal 1, OT)  --  long term goal (LTG);by discharge  -LW     Progress/Outcome (Toileting Goal 1,  OT)  --  goal not met  -LW        Grooming Goal 1 (OT)    Activity/Device (Grooming Goal 1, OT)  --  grooming skills, all  -LW     [place] (Grooming Goal 1, OT)  --  set-up required;tactile cues required;standby assist  -LW     Time Frame (Grooming Goal 1, OT)  --  long term goal (LTG);by discharge  -LW     Progress/Outcome (Grooming Goal 1, OT)  --  goal not met  -LW        Self-Feeding Goal 1 (OT)    Activity/Assistive Device (Self-Feeding Goal 1, OT)  --  self-feeding skills, all  -LW     [place] Level/Cues Needed (Self-Feeding Goal 1, OT)  --  set-up required;tactile cues required;standby assist  -LW     Time Frame (Self-Feeding Goal 1, OT)  --  long term goal (LTG);by discharge  -LW     Progress/Outcomes (Self-Feeding Goal 1, OT)  --  goal not met  -LW        Functional Mobility Goal 1 (OT)    Activity/Assistive Device (Functional Mobility Goal 1, OT)  --  -- as needed  -LW     [place] Level/Cues Needed (Functional Mobility Goal 1, OT)  --  standby assist  -LW     Distance Goal 1 (Functional Mobility, OT)  --  for ADL tasks with no LOB and fair safety   -LW     Time Frame (Functional Mobility Goal 1, OT)  --  long term goal (LTG);by discharge  -LW     Progress/Outcome (Functional Mobility Goal 1, OT)  --  goal not met  -LW       User Key  (r) = Recorded By, (t) = Taken By, (c) = Cosigned By    Initials Name Provider Type Discipline    TA Josefina Alves, PTA Physical Therapy Assistant PT    LW Madeline Young, STEPHENS/L Occupational Therapy Assistant OT              PT Recommendation and Plan  Anticipated Discharge Disposition (PT): home with 24/7 care  Therapy Frequency (PT Clinical Impression): daily  Outcome Summary/Treatment Plan (PT)  Daily Summary of Progress (PT): progress toward functional goals is good  Plan for Continued Treatment (PT): continue  Anticipated Discharge Disposition (PT): home with 24/7 care  Progress: improving  Outcome Summary: pt sup<>sit with CGA, sit<>stand with CGA, pt  ambulated 300` with HHA. pt will require 24/7 care @ D/C  Outcome Measures     Row Name 10/15/19 1700 10/15/19 1240 10/14/19 1323       How much help from another person do you currently need...    Turning from your back to your side while in flat bed without using bedrails?  3  -TA  --  --    Moving from lying on back to sitting on the side of a flat bed without bedrails?  3  -TA  --  --    Moving to and from a bed to a chair (including a wheelchair)?  3  -TA  --  --    Standing up from a chair using your arms (e.g., wheelchair, bedside chair)?  3  -TA  --  --    Climbing 3-5 steps with a railing?  3  -TA  --  --    To walk in hospital room?  3  -TA  --  --    AM-PAC 6 Clicks Score (PT)  18  -TA  --  --       How much help from another is currently needed...    Putting on and taking off regular lower body clothing?  --  3  -LW  3  -BH    Bathing (including washing, rinsing, and drying)  --  3  -LW  3  -BH    Toileting (which includes using toilet bed pan or urinal)  --  3  -LW  3  -BH    Putting on and taking off regular upper body clothing  --  3  -LW  3  -BH    Taking care of personal grooming (such as brushing teeth)  --  3  -LW  3  -BH    Eating meals  --  3  -LW  3  -BH    AM-PAC 6 Clicks Score (OT)  --  18  -LW  18  -BH       Functional Assessment    Outcome Measure Options  AM-PAC 6 Clicks Basic Mobility (PT)  -TA  --  AM-PAC 6 Clicks Daily Activity (OT)  -    Row Name 10/14/19 1300             How much help from another person do you currently need...    Turning from your back to your side while in flat bed without using bedrails?  3  -TA      Moving from lying on back to sitting on the side of a flat bed without bedrails?  3  -TA      Moving to and from a bed to a chair (including a wheelchair)?  3  -TA      Standing up from a chair using your arms (e.g., wheelchair, bedside chair)?  3  -TA      Climbing 3-5 steps with a railing?  3  -TA      To walk in hospital room?  3  -TA      AM-PAC 6 Clicks Score  (PT)  18  -TA         Functional Assessment    Outcome Measure Options  AM-PAC 6 Clicks Basic Mobility (PT)  -TA        User Key  (r) = Recorded By, (t) = Taken By, (c) = Cosigned By    Initials Name Provider Type     Cielo Stewart, OTR/L Occupational Therapist    Josefina Bahena PTA Physical Therapy Assistant    Madeline Long, STEPHENS/L Occupational Therapy Assistant         Time Calculation:   PT Charges     Row Name 10/15/19 1749             Time Calculation    Start Time  1415  -TA      Stop Time  1453  -TA      Time Calculation (min)  38 min  -TA      PT Received On  10/15/19  -TA         Time Calculation- PT    Total Timed Code Minutes- PT  38 minute(s)  -TA        User Key  (r) = Recorded By, (t) = Taken By, (c) = Cosigned By    Initials Name Provider Type    Josefina Bahena PTA Physical Therapy Assistant        Therapy Charges for Today     Code Description Service Date Service Provider Modifiers Qty    38790871807 HC GAIT TRAINING EA 15 MIN 10/14/2019 Josefina Alves, PTA GP 1    13564173425 HC PT THERAPEUTIC ACT EA 15 MIN 10/14/2019 Josefina Alves, PTA GP 1    30875694345 HC PT THER PROC EA 15 MIN 10/14/2019 Josefina Alves, PTA GP 1    50564849814 HC GAIT TRAINING EA 15 MIN 10/15/2019 Josefina Alves, PTA GP 1    45001308432 HC PT THERAPEUTIC ACT EA 15 MIN 10/15/2019 Josefina Alves, PTA GP 1    37913916775 HC PT THER PROC EA 15 MIN 10/15/2019 Josefina Alves, PTA GP 1          PT G-Codes  Outcome Measure Options: AM-PAC 6 Clicks Basic Mobility (PT)  AM-PAC 6 Clicks Score (PT): 18  AM-PAC 6 Clicks Score (OT): 18    Josefina Alves PTA  10/15/2019

## 2019-10-15 NOTE — PLAN OF CARE
Problem: Patient Care Overview  Goal: Plan of Care Review  Outcome: Ongoing (interventions implemented as appropriate)   10/15/19 1415 10/15/19 2693   Coping/Psychosocial   Plan of Care Reviewed With --  patient   Plan of Care Review   Progress --  no change   OTHER   Outcome Summary pt sup<>sit with CGA, sit<>stand with CGA, pt ambulated 300` with HHA. pt will require 24/7 care @ D/C --

## 2019-10-15 NOTE — PLAN OF CARE
Problem: Patient Care Overview  Goal: Plan of Care Review   10/15/19 2574   Coping/Psychosocial   Plan of Care Reviewed With patient   Plan of Care Review   Progress no change   OTHER   Outcome Summary Pt showered this afternoon with CGA. Pt dressing and bathing UB and LB CGA. Toileting t/f CGA no AD. Toileting tasks Max A. Pt supine in bed all needs in reach.

## 2019-10-15 NOTE — THERAPY TREATMENT NOTE
Acute Care - Occupational Therapy Treatment Note  [facility]     Patient Name: Melissa Parham  : 1945  MRN: [id number]  Today's Date: 10/15/2019  Onset of Illness/Injury or Date of Surgery: 10/10/19  Date of Referral to OT: 10/10/19  Referring Physician: Renetta PRITCHARD    Admit Date: 10/10/2019       ICD-10-CM ICD-9-CM   1. Acute pyelonephritis N10 590.10   2. Impaired functional mobility and endurance Z74.09 V49.89   3. Impaired mobility and ADLs Z74.09 799.89     Patient Active Problem List   Diagnosis   • Acute pyelonephritis     Past Medical History:   Diagnosis Date   • Arthritis    • CHF (congestive heart failure) (CMS/HCC)    • Deaf    • Diabetes mellitus (CMS/HCC)    • Elevated cholesterol    • Hypertension    • Nonverbal      History reviewed. No pertinent surgical history.    Therapy Treatment    Rehabilitation Treatment Summary     Row Name 10/15/19 1240             Treatment Time/Intention    Discipline  occupational therapy assistant  -LW      Document Type  therapy note (daily note)  -LW      Subjective Information  no complaints  -LW      Mode of Treatment  individual therapy;occupational therapy  -LW      Patient/Family Observations  Pt sleeping in BS[place]. Sister present.  -LW      Care Plan Review  care plan/treatment goals reviewed  -LW      Total Minutes, Occupational Therapy Treatment  60  -LW      Therapy Frequency (OT Eval)  other (see comments) 5-7 days a week  -LW      Patient Effort  good  -LW      Existing Precautions/Restrictions  fall;other (see comments) deaf, non verbal  -LW      Equipment Issued to Patient  gait belt  -LW      Recorded by [LW] Madeline Young COTA/L 10/15/19 1601      Row Name 10/15/19 1240             Vital Signs    Pre Systolic BP Rehab  120  -LW      Pre Treatment Diastolic BP  56  -LW      Pretreatment Heart Rate (beats/min)  66  -LW      Intratreatment Heart Rate (beats/min)  63  -LW      Posttreatment Heart Rate (beats/min)  69  -LW       Pre SpO2 (%)  96  -LW      O2 Delivery Pre Treatment  room air  -LW      Intra SpO2 (%)  95  -LW      O2 Delivery Intra Treatment  room air  -LW      Post SpO2 (%)  96  -LW      O2 Delivery Post Treatment  room air  -LW      Pre Patient Position  Sitting  -LW      Intra Patient Position  Sitting  -LW      Post Patient Position  Sitting  -LW      Recorded by [LW] Madeline Young COTA/L 10/15/19 1601      Row Name 10/15/19 1240             Cognitive Assessment/Intervention- PT/OT    Affect/Mental Status (Cognitive)  WFL  -LW      Orientation Status (Cognition)  unable/difficult to assess  -LW      Follows Commands (Cognition)  follows one step commands;50-74% accuracy;repetition of directions required;physical/tactile prompts required;delayed response/completion;increased processing time needed;initiation impaired  -LW      Safety Deficit (Cognitive)  moderate deficit  -LW      Personal Safety Interventions  fall prevention program maintained;gait belt;nonskid shoes/slippers when out of bed  -LW      Recorded by [LW] Madeline Young COTA/L 10/15/19 1601      Row Name 10/15/19 1240             Safety Issues, Functional Mobility    Safety Issues Affecting Function (Mobility)  ability to follow commands  -LW      Impairments Affecting Function (Mobility)  balance;coordination;endurance/activity tolerance  -LW      Recorded by [LW] Madeline Young COTA/L 10/15/19 1601      Row Name 10/15/19 1240             Functional Mobility    Functional Mobility- Ind. Level  contact guard assist  -LW      Functional Mobility- Device  other (see comments) No AD HHA  -LW      Recorded by [LW] Madeline Young COTA/L 10/15/19 1601      Row Name 10/15/19 1240             Transfer Assessment/Treatment    Transfer Assessment/Treatment  sit-stand transfer;stand-sit transfer;toilet transfer;shower transfer  -LW      Recorded by [LW] Madeline Young COTA/L 10/15/19 1601      Row Name 10/15/19 1240             Sit-Stand Transfer     Sit-Stand [place] (Transfers)  contact guard  -LW      Assistive Device (Sit-Stand Transfers)  other (see comments) No AD  -LW      Recorded by [LW] Madeline Young COTA/L 10/15/19 1601      Row Name 10/15/19 1240             Stand-Sit Transfer    Stand-Sit [place] (Transfers)  contact guard  -LW      Assistive Device (Stand-Sit Transfers)  other (see comments) No AD  -LW      Recorded by [LW] Madeline Young COTA/L 10/15/19 1601      Row Name 10/15/19 1240             Toilet Transfer    Type (Toilet Transfer)  sit-stand;stand-sit  -LW      [place] Level (Toilet Transfer)  contact guard;other (see comments) No AD  -LW      Assistive Device (Toilet Transfer)  grab bars/safety frame  -LW      Recorded by [LW] Madeline Young COTA/L 10/15/19 1601      Row Name 10/15/19 1240             Shower Transfer    Type (Shower Transfer)  sit-stand;stand-sit  -LW      [place] Level (Shower Transfer)  contact guard;other (see comments) No AD  -LW      Assistive Device (Shower Transfer)  grab bars/tub rail;shower chair  -LW      Recorded by [LW] Madeline Young COTA/L 10/15/19 1601      Row Name 10/15/19 1240             ADL Assessment/Intervention    BADL Assessment/Intervention  bathing;upper body dressing;lower body dressing;clothing fastener management;grooming;toileting  -LW      Recorded by [LW] Madeline Young COTA/L 10/15/19 1601      Row Name 10/15/19 1240             Bathing Assessment/Intervention    Bathing [place] Level  bathing skills;lower body;upper body;contact guard assist  -LW      Assistive Devices (Bathing)  bath mitt;shower chair;grab bars/tub rail;long-handled sponge  -LW      Bathing Position  supported sitting  -LW      Recorded by [LW] Madeline Young COTA/L 10/15/19 1601      Row Name 10/15/19 1240             Upper Body Dressing Assessment/Training    Upper Body Dressing [place] Level  upper body dressing skills;doff;don;contact guard assist  -LW      Upper Body  Dressing Position  edge of bed sitting  -LW      Recorded by [LW] Madeline Young STEPHENS/L 10/15/19 1601      Row Name 10/15/19 1240             Lower Body Dressing Assessment/Training    Lower Body Dressing [place] Level  lower body dressing skills;doff;don;socks;contact guard assist  -LW      Lower Body Dressing Position  edge of bed sitting  -LW      Recorded by [LW] Madeline Young STEPHENS/L 10/15/19 1601      Row Name 10/15/19 1240             Grooming Assessment/Training    [place] Level (Grooming)  grooming skills;hair care, combing/brushing;contact guard assist  -LW      Grooming Position  edge of bed sitting  -LW      Recorded by [LW] Madeline Young STEPHENS/L 10/15/19 1601      Row Name 10/15/19 1240             Toileting Assessment/Training    [place] Level (Toileting)  toileting skills;adjust/manage clothing;perform perineal hygiene;maximum assist (25% patient effort)  -LW      Assistive Devices (Toileting)  commode;grab bar/safety frame  -LW      Toileting Position  supported sitting  -LW      Recorded by [LW] Madeline Young COTA/L 10/15/19 1601      Row Name 10/15/19 1240             BADL Safety/Performance    Impairments, BADL Safety/Performance  balance;endurance/activity tolerance  -LW      Recorded by [LW] Madeline Young STEPHENS/L 10/15/19 1601      Row Name 10/15/19 1240             Positioning and Restraints    Pre-Treatment Position  sitting in chair/recliner  -LW      Post Treatment Position  bed  -LW      In Bed  supine;call light within reach;encouraged to call for assist;exit alarm on;with family/caregiver  -LW      Recorded by [LW] Madelien Young STEPHENS/L 10/15/19 1601      Row Name 10/15/19 1240             Pain Scale: Numbers Pre/Post-Treatment    Pain Scale: Numbers, Pretreatment  -- Pt shows no s/s of pain  -LW      Pain Scale: Numbers, Post-Treatment  -- Pt shows no s/s of pain  -LW      Recorded by [LW] Madeline Young STEPHENS/L 10/15/19 1601      Row Name 10/15/19  1240             Coping    Observed Emotional State  calm;cooperative  -LW      Verbalized Emotional State  acceptance  -LW      Recorded by [LW] Madeline Young COTA/L 10/15/19 1601      Row Name 10/15/19 1240             Plan of Care Review    Plan of Care Reviewed With  patient;sibling  -LW      Recorded by [LW] Madeline Young COTA/L 10/15/19 1601      Row Name 10/15/19 1240             Outcome Summary/Treatment Plan (OT)    Daily Summary of Progress (OT)  progress toward functional goals is good  -LW      Plan for Continued Treatment (OT)  Continue POC  -LW      Anticipated Discharge Disposition (OT)  anticipate therapy at next level of care  -LW      Recorded by [LW] Madeline Young COTA/PAUL 10/15/19 1601        User Key  (r) = Recorded By, (t) = Taken By, (c) = Cosigned By    Initials Name Effective Dates Discipline    LW Madeline Young COTA/L 03/07/18 -  OT           Rehab Goal Summary     Row Name 10/15/19 1240             Occupational Therapy Goals    Transfer Goal Selection (OT)  transfer, OT goal 1  -LW      Bathing Goal Selection (OT)  bathing, OT goal 1  -LW      Dressing Goal Selection (OT)  dressing, OT goal 1  -LW      Toileting Goal Selection (OT)  toileting, OT goal 1  -LW      Grooming Goal Selection (OT)  grooming, OT goal 1  -LW      Self-Feeding Goal Selection (OT)  self feeding, OT goal 1  -LW      Functional Mobility Goal Selection (OT)  functional mobility, OT goal 1  -LW         Transfer Goal 1 (OT)    Activity/Assistive Device (Transfer Goal 1, OT)  toilet  -LW      [place] Level/Cues Needed (Transfer Goal 1, OT)  standby assist  -LW      Time Frame (Transfer Goal 1, OT)  long term goal (LTG);by discharge  -LW      Progress/Outcome (Transfer Goal 1, OT)  goal met  (Significant)   -LW         Bathing Goal 1 (OT)    Activity/Assistive Device (Bathing Goal 1, OT)  bathing skills, all  -LW      [place] Level/Cues Needed (Bathing Goal 1, OT)  set-up required;standby  assist;tactile cues required  -LW      Time Frame (Bathing Goal 1, OT)  long term goal (LTG);by discharge  -LW      Progress/Outcomes (Bathing Goal 1, OT)  goal met  (Significant)   -LW         Dressing Goal 1 (OT)    Activity/Assistive Device (Dressing Goal 1, OT)  dressing skills, all  -LW      [place]/Cues Needed (Dressing Goal 1, OT)  set-up required;tactile cues required;standby assist  -LW      Time Frame (Dressing Goal 1, OT)  long term goal (LTG);by discharge  -LW      Progress/Outcome (Dressing Goal 1, OT)  goal met  (Significant)   -LW         Toileting Goal 1 (OT)    Activity/Device (Toileting Goal 1, OT)  toileting skills, all  -LW      [place] Level/Cues Needed (Toileting Goal 1, OT)  set-up required;tactile cues required;standby assist  -LW      Time Frame (Toileting Goal 1, OT)  long term goal (LTG);by discharge  -LW      Progress/Outcome (Toileting Goal 1, OT)  goal not met  -LW         Grooming Goal 1 (OT)    Activity/Device (Grooming Goal 1, OT)  grooming skills, all  -LW      [place] (Grooming Goal 1, OT)  set-up required;tactile cues required;standby assist  -LW      Time Frame (Grooming Goal 1, OT)  long term goal (LTG);by discharge  -LW      Progress/Outcome (Grooming Goal 1, OT)  goal not met  -LW         Self-Feeding Goal 1 (OT)    Activity/Assistive Device (Self-Feeding Goal 1, OT)  self-feeding skills, all  -LW      [place] Level/Cues Needed (Self-Feeding Goal 1, OT)  set-up required;tactile cues required;standby assist  -LW      Time Frame (Self-Feeding Goal 1, OT)  long term goal (LTG);by discharge  -LW      Progress/Outcomes (Self-Feeding Goal 1, OT)  goal not met  -LW         Functional Mobility Goal 1 (OT)    Activity/Assistive Device (Functional Mobility Goal 1, OT)  -- as needed  -LW      [place] Level/Cues Needed (Functional Mobility Goal 1, OT)  standby assist  -LW      Distance Goal 1 (Functional Mobility, OT)  for ADL tasks with no LOB and fair safety    -      Time Frame (Functional Mobility Goal 1, OT)  long term goal (LTG);by discharge  -      Progress/Outcome (Functional Mobility Goal 1, OT)  goal not met  -        User Key  (r) = Recorded By, (t) = Taken By, (c) = Cosigned By    Initials Name Provider Type Discipline    Madeline Long COTA/L Occupational Therapy Assistant OT        Occupational Therapy Education     Title: PT OT SLP Therapies (Done)     Topic: Occupational Therapy (Done)     Point: ADL training (Done)     Description: Instruct learner(s) on proper safety adaptation and remediation techniques during self care or transfers.   Instruct in proper use of assistive devices.    Learning Progress Summary           Patient Acceptance, E,TB, VU by  at 10/15/2019  4:03 PM    Acceptance, E, VU,NR by  at 10/14/2019  3:12 PM    Comment:  Educated about OT and POC. Educated to call for assist. Educated on safety throughout.   Family Acceptance, E, VU,NR by  at 10/14/2019  3:12 PM    Comment:  Educated about OT and POC. Educated to call for assist. Educated on safety throughout.                   Point: Home exercise program (Done)     Description: Instruct learner(s) on appropriate technique for monitoring, assisting and/or progressing therapeutic exercises/activities.    Learning Progress Summary           Patient Acceptance, E,TB, VU by  at 10/15/2019  4:03 PM                   Point: Precautions (Done)     Description: Instruct learner(s) on prescribed precautions during self-care and functional transfers.    Learning Progress Summary           Patient Acceptance, E,TB, VU by  at 10/15/2019  4:03 PM    Acceptance, E, VU,NR by  at 10/14/2019  3:12 PM    Comment:  Educated about OT and POC. Educated to call for assist. Educated on safety throughout.   Family Acceptance, E, VU,NR by  at 10/14/2019  3:12 PM    Comment:  Educated about OT and POC. Educated to call for assist. Educated on safety throughout.                   Point: Body  mechanics (Done)     Description: Instruct learner(s) on proper positioning and spine alignment during self-care, functional mobility activities and/or exercises.    Learning Progress Summary           Patient Acceptance, E,TB, VU by LW at 10/15/2019  4:03 PM                               User Key     Initials Effective Dates Name Provider Type Discipline     06/08/18 -  Cielo Stewart, OTR/L Occupational Therapist OT    LW 03/07/18 -  Madeline Young, STEPHENS/L Occupational Therapy Assistant OT                OT Recommendation and Plan  Outcome Summary/Treatment Plan (OT)  Daily Summary of Progress (OT): progress toward functional goals is good  Plan for Continued Treatment (OT): Continue POC  Anticipated Discharge Disposition (OT): anticipate therapy at next level of care  Therapy Frequency (OT Eval): other (see comments)(5-7 days a week)  Daily Summary of Progress (OT): progress toward functional goals is good  Plan of Care Review  Plan of Care Reviewed With: patient, sibling  Plan of Care Reviewed With: patient, sibling  Outcome Measures     Row Name 10/15/19 1240 10/14/19 1323 10/14/19 1300       How much help from another person do you currently need...    Turning from your back to your side while in flat bed without using bedrails?  --  --  3  -TA    Moving from lying on back to sitting on the side of a flat bed without bedrails?  --  --  3  -TA    Moving to and from a bed to a chair (including a wheelchair)?  --  --  3  -TA    Standing up from a chair using your arms (e.g., wheelchair, bedside chair)?  --  --  3  -TA    Climbing 3-5 steps with a railing?  --  --  3  -TA    To walk in hospital room?  --  --  3  -TA    AM-PAC 6 Clicks Score (PT)  --  --  18  -TA       How much help from another is currently needed...    Putting on and taking off regular lower body clothing?  3  -LW  3  -BH  --    Bathing (including washing, rinsing, and drying)  3  -LW  3  -BH  --    Toileting (which includes using toilet bed  pan or urinal)  3  -LW  3  -BH  --    Putting on and taking off regular upper body clothing  3  -LW  3  -BH  --    Taking care of personal grooming (such as brushing teeth)  3  -LW  3  -BH  --    Eating meals  3  -LW  3  -BH  --    AM-PAC 6 Clicks Score (OT)  18  -LW  18  -BH  --       Functional Assessment    Outcome Measure Options  --  AM-PAC 6 Clicks Daily Activity (OT)  -  AM-PAC 6 Clicks Basic Mobility (PT)  -      User Key  (r) = Recorded By, (t) = Taken By, (c) = Cosigned By    Initials Name Provider Type     Cielo Stewart, OTR/L Occupational Therapist    Josefina Bahena, PTA Physical Therapy Assistant    Madeline Long COTA/L Occupational Therapy Assistant           Time Calculation:   Time Calculation- OT     Row Name 10/15/19 1603             Time Calculation- OT    OT Start Time  1240  -LW      OT Stop Time  1340  -LW      OT Time Calculation (min)  60 min  -LW      Total Timed Code Minutes- OT  60 minute(s)  -LW      OT Received On  10/15/19  -        User Key  (r) = Recorded By, (t) = Taken By, (c) = Cosigned By    Initials Name Provider Type    Madeline Long COTA/L Occupational Therapy Assistant        Therapy Charges for Today     Code Description Service Date Service Provider Modifiers Qty    [id number] HC OT SELF CARE/MGMT/TRAIN EA 15 MIN 10/15/2019 Madeline Young COTA/L GO 4        Non-skid socks and gait belt in place. Toileting offered. Call light and needs within reach. Pt advised to not get up alone and call the nurse for assistance.  Bed alarm on.     BETHANY Cisneros  10/15/2019

## 2019-10-16 LAB
ALBUMIN SERPL-MCNC: 2.9 G/DL (ref 3.5–5.2)
ALBUMIN/GLOB SERPL: 0.6 G/DL
ALP SERPL-CCNC: 122 U/L (ref 39–117)
ALT SERPL W P-5'-P-CCNC: 28 U/L (ref 1–33)
ANION GAP SERPL CALCULATED.3IONS-SCNC: 12 MMOL/L (ref 5–15)
AST SERPL-CCNC: 29 U/L (ref 1–32)
BASOPHILS # BLD AUTO: 0.04 10*3/MM3 (ref 0–0.2)
BASOPHILS NFR BLD AUTO: 0.5 % (ref 0–1.5)
BILIRUB SERPL-MCNC: 0.3 MG/DL (ref 0.2–1.2)
BUN BLD-MCNC: 19 MG/DL (ref 8–23)
BUN/CREAT SERPL: 17.6 (ref 7–25)
CALCIUM SPEC-SCNC: 9.7 MG/DL (ref 8.6–10.5)
CHLORIDE SERPL-SCNC: 105 MMOL/L (ref 98–107)
CO2 SERPL-SCNC: 26 MMOL/L (ref 22–29)
CREAT BLD-MCNC: 1.08 MG/DL (ref 0.57–1)
DEPRECATED RDW RBC AUTO: 39.6 FL (ref 37–54)
EOSINOPHIL # BLD AUTO: 0.23 10*3/MM3 (ref 0–0.4)
EOSINOPHIL NFR BLD AUTO: 2.7 % (ref 0.3–6.2)
ERYTHROCYTE [DISTWIDTH] IN BLOOD BY AUTOMATED COUNT: 13.4 % (ref 12.3–15.4)
GFR SERPL CREATININE-BSD FRML MDRD: 50 ML/MIN/1.73
GLOBULIN UR ELPH-MCNC: 4.5 GM/DL
GLUCOSE BLD-MCNC: 120 MG/DL (ref 65–99)
GLUCOSE BLDC GLUCOMTR-MCNC: 116 MG/DL (ref 70–130)
GLUCOSE BLDC GLUCOMTR-MCNC: 178 MG/DL (ref 70–130)
GLUCOSE BLDC GLUCOMTR-MCNC: 235 MG/DL (ref 70–130)
GLUCOSE BLDC GLUCOMTR-MCNC: 243 MG/DL (ref 70–130)
HCT VFR BLD AUTO: 32 % (ref 34–46.6)
HGB BLD-MCNC: 10.6 G/DL (ref 12–15.9)
IMM GRANULOCYTES # BLD AUTO: 0.37 10*3/MM3 (ref 0–0.05)
IMM GRANULOCYTES NFR BLD AUTO: 4.3 % (ref 0–0.5)
LYMPHOCYTES # BLD AUTO: 1.48 10*3/MM3 (ref 0.7–3.1)
LYMPHOCYTES NFR BLD AUTO: 17.2 % (ref 19.6–45.3)
MAGNESIUM SERPL-MCNC: 1.4 MG/DL (ref 1.6–2.4)
MCH RBC QN AUTO: 26.8 PG (ref 26.6–33)
MCHC RBC AUTO-ENTMCNC: 33.1 G/DL (ref 31.5–35.7)
MCV RBC AUTO: 81 FL (ref 79–97)
MONOCYTES # BLD AUTO: 0.96 10*3/MM3 (ref 0.1–0.9)
MONOCYTES NFR BLD AUTO: 11.2 % (ref 5–12)
NEUTROPHILS # BLD AUTO: 5.5 10*3/MM3 (ref 1.7–7)
NEUTROPHILS NFR BLD AUTO: 64.1 % (ref 42.7–76)
NRBC BLD AUTO-RTO: 0 /100 WBC (ref 0–0.2)
PLATELET # BLD AUTO: 270 10*3/MM3 (ref 140–450)
PMV BLD AUTO: 9.3 FL (ref 6–12)
POTASSIUM BLD-SCNC: 4.2 MMOL/L (ref 3.5–5.2)
PROT SERPL-MCNC: 7.4 G/DL (ref 6–8.5)
RBC # BLD AUTO: 3.95 10*6/MM3 (ref 3.77–5.28)
SODIUM BLD-SCNC: 143 MMOL/L (ref 136–145)
VANCOMYCIN PEAK SERPL-MCNC: 31.4 MCG/ML (ref 20–40)
WBC NRBC COR # BLD: 8.58 10*3/MM3 (ref 3.4–10.8)

## 2019-10-16 PROCEDURE — 80053 COMPREHEN METABOLIC PANEL: CPT | Performed by: NURSE PRACTITIONER

## 2019-10-16 PROCEDURE — 97110 THERAPEUTIC EXERCISES: CPT

## 2019-10-16 PROCEDURE — 90674 CCIIV4 VAC NO PRSV 0.5 ML IM: CPT | Performed by: INTERNAL MEDICINE

## 2019-10-16 PROCEDURE — 82962 GLUCOSE BLOOD TEST: CPT

## 2019-10-16 PROCEDURE — G0008 ADMIN INFLUENZA VIRUS VAC: HCPCS | Performed by: INTERNAL MEDICINE

## 2019-10-16 PROCEDURE — 25010000002 VANCOMYCIN 1 G RECONSTITUTED SOLUTION: Performed by: NURSE PRACTITIONER

## 2019-10-16 PROCEDURE — 25010000002 HEPARIN (PORCINE) PER 1000 UNITS: Performed by: NURSE PRACTITIONER

## 2019-10-16 PROCEDURE — 25010000002 FUROSEMIDE PER 20 MG: Performed by: NURSE PRACTITIONER

## 2019-10-16 PROCEDURE — 25010000002 CEFEPIME PER 500 MG: Performed by: NURSE PRACTITIONER

## 2019-10-16 PROCEDURE — 80202 ASSAY OF VANCOMYCIN: CPT | Performed by: NURSE PRACTITIONER

## 2019-10-16 PROCEDURE — 25010000002 MAGNESIUM SULFATE 2 GM/50ML SOLUTION: Performed by: NURSE PRACTITIONER

## 2019-10-16 PROCEDURE — 25010000002 INFLUENZA VAC SUBUNIT QUAD 0.5 ML SUSPENSION PREFILLED SYRINGE: Performed by: INTERNAL MEDICINE

## 2019-10-16 PROCEDURE — 63710000001 INSULIN DETEMIR PER 5 UNITS: Performed by: NURSE PRACTITIONER

## 2019-10-16 PROCEDURE — 97535 SELF CARE MNGMENT TRAINING: CPT

## 2019-10-16 PROCEDURE — 97116 GAIT TRAINING THERAPY: CPT

## 2019-10-16 PROCEDURE — 97530 THERAPEUTIC ACTIVITIES: CPT

## 2019-10-16 PROCEDURE — 63710000001 INSULIN ASPART PER 5 UNITS: Performed by: NURSE PRACTITIONER

## 2019-10-16 PROCEDURE — 83735 ASSAY OF MAGNESIUM: CPT | Performed by: NURSE PRACTITIONER

## 2019-10-16 PROCEDURE — 85025 COMPLETE CBC W/AUTO DIFF WBC: CPT | Performed by: NURSE PRACTITIONER

## 2019-10-16 RX ORDER — MAGNESIUM SULFATE HEPTAHYDRATE 40 MG/ML
4 INJECTION, SOLUTION INTRAVENOUS AS NEEDED
Status: DISCONTINUED | OUTPATIENT
Start: 2019-10-16 | End: 2019-10-17 | Stop reason: HOSPADM

## 2019-10-16 RX ORDER — MAGNESIUM SULFATE HEPTAHYDRATE 40 MG/ML
2 INJECTION, SOLUTION INTRAVENOUS AS NEEDED
Status: DISCONTINUED | OUTPATIENT
Start: 2019-10-16 | End: 2019-10-17 | Stop reason: HOSPADM

## 2019-10-16 RX ADMIN — MAGNESIUM SULFATE HEPTAHYDRATE 2 G: 40 INJECTION, SOLUTION INTRAVENOUS at 17:23

## 2019-10-16 RX ADMIN — FUROSEMIDE 20 MG: 10 INJECTION, SOLUTION INTRAVENOUS at 04:10

## 2019-10-16 RX ADMIN — CETIRIZINE HYDROCHLORIDE 5 MG: 5 TABLET ORAL at 08:31

## 2019-10-16 RX ADMIN — METOPROLOL TARTRATE 50 MG: 50 TABLET ORAL at 08:31

## 2019-10-16 RX ADMIN — CEFEPIME HYDROCHLORIDE 2 G: 2 INJECTION, POWDER, FOR SOLUTION INTRAVENOUS at 04:10

## 2019-10-16 RX ADMIN — INSULIN ASPART 2 UNITS: 100 INJECTION, SOLUTION INTRAVENOUS; SUBCUTANEOUS at 12:12

## 2019-10-16 RX ADMIN — CLONAZEPAM 0.5 MG: 0.5 TABLET ORAL at 21:47

## 2019-10-16 RX ADMIN — SODIUM CHLORIDE, PRESERVATIVE FREE 10 ML: 5 INJECTION INTRAVENOUS at 08:31

## 2019-10-16 RX ADMIN — RISPERIDONE 0.25 MG: 0.25 TABLET ORAL at 08:31

## 2019-10-16 RX ADMIN — HYDROCORTISONE: 1 CREAM TOPICAL at 21:48

## 2019-10-16 RX ADMIN — INSULIN ASPART 4 UNITS: 100 INJECTION, SOLUTION INTRAVENOUS; SUBCUTANEOUS at 17:24

## 2019-10-16 RX ADMIN — MAGNESIUM SULFATE HEPTAHYDRATE 2 G: 40 INJECTION, SOLUTION INTRAVENOUS at 16:11

## 2019-10-16 RX ADMIN — VANCOMYCIN HYDROCHLORIDE 1000 MG: 1 INJECTION, POWDER, LYOPHILIZED, FOR SOLUTION INTRAVENOUS at 12:11

## 2019-10-16 RX ADMIN — PANTOPRAZOLE SODIUM 40 MG: 40 TABLET, DELAYED RELEASE ORAL at 06:30

## 2019-10-16 RX ADMIN — INSULIN ASPART 4 UNITS: 100 INJECTION, SOLUTION INTRAVENOUS; SUBCUTANEOUS at 08:34

## 2019-10-16 RX ADMIN — HEPARIN SODIUM 5000 UNITS: 5000 INJECTION INTRAVENOUS; SUBCUTANEOUS at 08:31

## 2019-10-16 RX ADMIN — CLONAZEPAM 0.5 MG: 0.5 TABLET ORAL at 08:31

## 2019-10-16 RX ADMIN — INSULIN DETEMIR 15 UNITS: 100 INJECTION, SOLUTION SUBCUTANEOUS at 09:02

## 2019-10-16 RX ADMIN — SERTRALINE HYDROCHLORIDE 25 MG: 25 TABLET ORAL at 08:31

## 2019-10-16 RX ADMIN — HYDROCODONE BITARTRATE AND ACETAMINOPHEN 1 TABLET: 7.5; 325 TABLET ORAL at 21:47

## 2019-10-16 RX ADMIN — FUROSEMIDE 20 MG: 10 INJECTION, SOLUTION INTRAVENOUS at 16:11

## 2019-10-16 RX ADMIN — CLOPIDOGREL BISULFATE 75 MG: 75 TABLET ORAL at 08:31

## 2019-10-16 RX ADMIN — INFLUENZA A VIRUS A/IDAHO/07/2018 (H1N1) ANTIGEN (MDCK CELL DERIVED, PROPIOLACTONE INACTIVATED, INFLUENZA A VIRUS A/INDIANA/08/2018 (H3N2) ANTIGEN (MDCK CELL DERIVED, PROPIOLACTONE INACTIVATED), INFLUENZA B VIRUS B/SINGAPORE/INFTT-16-0610/2016 ANTIGEN (MDCK CELL DERIVED, PROPIOLACTONE INACTIVATED), INFLUENZA B VIRUS B/IOWA/06/2017 ANTIGEN (MDCK CELL DERIVED, PROPIOLACTONE INACTIVATED) 0.5 ML: 15; 15; 15; 15 INJECTION, SUSPENSION INTRAMUSCULAR at 16:11

## 2019-10-16 RX ADMIN — MAGNESIUM SULFATE HEPTAHYDRATE 2 G: 40 INJECTION, SOLUTION INTRAVENOUS at 21:59

## 2019-10-16 RX ADMIN — SODIUM CHLORIDE, PRESERVATIVE FREE 10 ML: 5 INJECTION INTRAVENOUS at 21:48

## 2019-10-16 RX ADMIN — HYDROCORTISONE: 1 CREAM TOPICAL at 08:35

## 2019-10-16 RX ADMIN — ATORVASTATIN CALCIUM 10 MG: 10 TABLET, FILM COATED ORAL at 21:47

## 2019-10-16 RX ADMIN — HEPARIN SODIUM 5000 UNITS: 5000 INJECTION INTRAVENOUS; SUBCUTANEOUS at 21:47

## 2019-10-16 NOTE — PROGRESS NOTES
"Pharmacokinetics by Pharmacy - Vancomycin    Melissa Parham is a 74 y.o. female receiving vancomycin 1000mg IV Q24H day 2 (re-initiated) for pneumonia/fevers  Patient is also receiving cefepime    Objective:  [Ht: 154.9 cm (61\"); Wt: 66.8 kg (147 lb 3.2 oz)]     Lab Results   Component Value Date    WBC 8.58 10/16/2019    WBC 7.02 10/15/2019    WBC 6.70 10/14/2019      Lab Results   Component Value Date    LACTATE 1.5 10/10/2019      Temp Readings from Last 1 Encounters:   10/16/19 98.4 °F (36.9 °C) (Axillary)     Estimated Creatinine Clearance: 40 mL/min (A) (by C-G formula based on SCr of 1.08 mg/dL (H)).   Lab Results   Component Value Date    CREATININE 1.08 (H) 10/16/2019    CREATININE 1.11 (H) 10/15/2019    CREATININE 1.32 (H) 10/14/2019       Lab Results   Component Value Date    VANCORANDOM 9.50 10/13/2019       Culture Results:  Microbiology Results (last 10 days)       Procedure Component Value - Date/Time    MRSA Screen, PCR - Swab, Nares [[id number]]  (Normal) Collected:  10/13/19 0116    Lab Status:  Final result Specimen:  Swab from Nares Updated:  10/13/19 0507     MRSA, PCR Negative    Narrative:       Performed by real-time polymerase chain reaction (qPCR).    Blood Culture - Blood, Hand, Left [[id number]] Collected:  10/12/19 1922    Lab Status:  Preliminary result Specimen:  Blood from Hand, Left Updated:  10/15/19 1931     Blood Culture No growth at 3 days    Blood Culture - Blood, Hand, Left [[id number]] Collected:  10/12/19 1922    Lab Status:  Preliminary result Specimen:  Blood from Hand, Left Updated:  10/15/19 1931     Blood Culture No growth at 3 days    Respiratory Panel, PCR - Swab, Nasopharynx [[id number]]  (Normal) Collected:  10/11/19 1816    Lab Status:  Final result Specimen:  Swab from Nasopharynx Updated:  10/11/19 2008     ADENOVIRUS, PCR Not Detected     Coronavirus 229E Not Detected     Coronavirus HKU1 Not Detected     Coronavirus NL63 Not Detected     Coronavirus OC43 Not " Detected     Human Metapneumovirus Not Detected     Human Rhinovirus/Enterovirus Not Detected     Influenza B PCR Not Detected     Parainfluenza Virus 1 Not Detected     Parainfluenza Virus 2 Not Detected     Parainfluenza Virus 3 Not Detected     Parainfluenza Virus 4 Not Detected     Bordetella pertussis pcr Not Detected     Influenza A H1 2009 PCR Not Detected     Chlamydophila pneumoniae PCR Not Detected     Mycoplasma pneumo by PCR Not Detected     Influenza A PCR Not Detected     Influenza A H3 Not Detected     Influenza A H1 Not Detected     RSV, PCR Not Detected    Legionella Antigen, Urine - Urine, Urine, Clean Catch [[id number]]  (Normal) Collected:  10/11/19 1632    Lab Status:  Final result Specimen:  Urine, Clean Catch Updated:  10/11/19 1709     LEGIONELLA ANTIGEN, URINE Negative    S. Pneumo Ag Urine or CSF - Urine, Urine, Clean Catch [[id number]]  (Normal) Collected:  10/11/19 1632    Lab Status:  Final result Specimen:  Urine, Clean Catch Updated:  10/11/19 1709     Strep Pneumo Ag Negative    Influenza Antigen, Rapid - Swab, Nasopharynx [[id number]]  (Normal) Collected:  10/10/19 1546    Lab Status:  Final result Specimen:  Swab from Nasopharynx Updated:  10/10/19 1622     Influenza A Ag, EIA Negative     Influenza B Ag, EIA Negative    Urine Culture - Urine, Urine, Clean Catch [[id number]]  (Abnormal)  (Susceptibility) Collected:  10/10/19 1533    Lab Status:  Final result Specimen:  Urine, Clean Catch Updated:  10/12/19 0347     Urine Culture >100,000 CFU/mL Escherichia coli    Susceptibility        Escherichia coli     TABATHA     Ampicillin Resistant     Ampicillin + Sulbactam Intermediate     Cefazolin Susceptible     Cefepime Susceptible     Ceftazidime Susceptible     Ceftriaxone Susceptible     Gentamicin Susceptible     Levofloxacin Susceptible     Nitrofurantoin Susceptible     Piperacillin + Tazobactam Susceptible     Tetracycline Resistant     Trimethoprim + Sulfamethoxazole Resistant                                    Assessment:  WBC below normal limits, trending up slightly  SCr trending down, slightly above normal limits  Afebrile today post re-initiation of vancomycin    10/10 urine - E. Coli  10/10 negative influenza, strep pneumo, legionella  10/11 negative respiratory panel  10/12 blood - NG3d  10/13 negative MRSA PCR        Previous vancomycin 750 mg IV daily predicted an AUC of approx 520.  SCr has improved.  Will re-initiate with vancomycin 1000 mg IV q24h.  Last dose of vancomycin 750 mg IV was 10/13 2123.  Reviewed patient with attending today, re-initiating vancomycin for persistent fevers.     Utilizing AUC dosing  Goal AUC for pneumonia: 400-600     Plan:  1. Vancomycin 1000 mg IV q24h  2. Peak 10/16 02333, Trough 10/17 1200  3. Pharmacy will monitor renal function and adjust dose accordingly.      Wenceslao Squires, [facility]   10/16/19 11:14 AM

## 2019-10-16 NOTE — PLAN OF CARE
Problem: Patient Care Overview  Goal: Plan of Care Review  Outcome: Ongoing (interventions implemented as appropriate)   10/16/19 1456   Coping/Psychosocial   Plan of Care Reviewed With patient;family   Plan of Care Review   Progress no change   OTHER   Outcome Summary Current weight 147#, BMI 27.8. Family at bedside she often has trouble chewing d/t edentulous and thinks may do better w/ soft/chopped meats. RD following.

## 2019-10-16 NOTE — THERAPY TREATMENT NOTE
Acute Care - Physical Therapy Treatment Note  [facility]     Patient Name: Melissa Parham  : 1945  MRN: [id number]  Today's Date: 10/16/2019  Onset of Illness/Injury or Date of Surgery: 10/10/19  Date of Referral to PT: 10/11/19  Referring Physician: Renetta PRITCHARD    Admit Date: 10/10/2019    Visit Dx:    ICD-10-CM ICD-9-CM   1. Acute pyelonephritis N10 590.10   2. Impaired functional mobility and endurance Z74.09 V49.89   3. Impaired mobility and ADLs Z74.09 799.89     Patient Active Problem List   Diagnosis   • Acute pyelonephritis       Therapy Treatment    Rehabilitation Treatment Summary     Row Name 10/16/19 1030 10/16/19 0705          Treatment Time/Intention    Discipline  physical therapy assistant  -TA  occupational therapy assistant  -LW     Document Type  therapy note (daily note)  -TA  therapy note (daily note)  -LW     Subjective Information  no complaints  -TA  no complaints  -LW     Mode of Treatment  individual therapy;physical therapy  -TA  occupational therapy  -LW     Patient/Family Observations  --  Pt supine in bed. Sister present.   -LW     Care Plan Review  --  care plan/treatment goals reviewed  -LW     Therapy Frequency (PT Clinical Impression)  daily  -TA  --     Total Minutes, Occupational Therapy Treatment  --  60  -LW     Therapy Frequency (OT Eval)  --  other (see comments) 5-7 days per week  -LW     Patient Effort  fair  -TA  adequate  -LW     Existing Precautions/Restrictions  other (see comments) deaf, non verbal  -TA  other (see comments) deaf, non verbal  -LW     Equipment Issued to Patient  --  gait belt  -LW     Recorded by [TA] Josefina Alves, PTA 10/16/19 1311 [LW] Madeline Young, STEPHENS/L 10/16/19 1155     Row Name 10/16/19 1030 10/16/19 0705          Vital Signs    Pre Systolic BP Rehab  129  -TA  151  -LW     Pre Treatment Diastolic BP  74  -TA  65  -LW     Pretreatment Heart Rate (beats/min)  61  -TA  77  -LW     Intratreatment Heart Rate  (beats/min)  67  -TA  --     Posttreatment Heart Rate (beats/min)  69  -TA  79  -LW     Pre SpO2 (%)  93  -TA  96  -LW     O2 Delivery Pre Treatment  room air  -TA  room air  -LW     Intra SpO2 (%)  93  -TA  --     O2 Delivery Intra Treatment  room air  -TA  --     Post SpO2 (%)  94  -TA  95  -LW     O2 Delivery Post Treatment  room air  -TA  room air  -LW     Pre Patient Position  Sitting  -TA  Supine  -LW     Post Patient Position  Sitting  -TA  Sitting  -LW     Recorded by [TA] Josefina Alves, PTA 10/16/19 1311 [LW] Madeline Young COTA/L 10/16/19 1155     Row Name 10/16/19 1030 10/16/19 0705          Cognitive Assessment/Intervention- PT/OT    Affect/Mental Status (Cognitive)  --  WFL  -LW     Orientation Status (Cognition)  unable/difficult to assess deaf, non-verbal  -TA  unable/difficult to assess deaf, non-verbal  -LW     Follows Commands (Cognition)  follows one step commands;50-74% accuracy;physical/tactile prompts required;repetition of directions required  -TA  follows one step commands;75-90% accuracy  -LW     Safety Deficit (Cognitive)  --  moderate deficit  -LW     Personal Safety Interventions  fall prevention program maintained;gait belt;nonskid shoes/slippers when out of bed;supervised activity  -TA  fall prevention program maintained;gait belt;nonskid shoes/slippers when out of bed  -LW     Recorded by [TA] Josefina Alves, PTA 10/16/19 1311 [LW] Madeline Young COTA/L 10/16/19 1155     Row Name 10/16/19 0705             Safety Issues, Functional Mobility    Safety Issues Affecting Function (Mobility)  ability to follow commands  -LW      Impairments Affecting Function (Mobility)  balance;endurance/activity tolerance;strength  -LW      Recorded by [LW] Madeline Young COTA/L 10/16/19 1155      Row Name 10/16/19 1030 10/16/19 0705          Bed Mobility Assessment/Treatment    Bed Mobility Assessment/Treatment  supine-sit;sit-supine  -TA  supine-sit  -LW     Supine-Sit [place] (Bed  Mobility)  not tested  -TA  contact guard  -LW     Assistive Device (Bed Mobility)  --  bed rails  -LW     Recorded by [TA] Josefina Alves, PTA 10/16/19 1311 [LW] Madeline Young STEPHENS/L 10/16/19 1155     Row Name 10/16/19 0705             Functional Mobility    Functional Mobility- Ind. Level  contact guard assist  -LW      Functional Mobility- Device  other (see comments) No AD HHA  -LW      Recorded by [LW] Madeline Young STEPHENS/L 10/16/19 1155      Row Name 10/16/19 1030 10/16/19 0705          Transfer Assessment/Treatment    Transfer Assessment/Treatment  sit-stand transfer;stand-sit transfer  -TA  sit-stand transfer;stand-sit transfer;bed-chair transfer;toilet transfer  -LW     Recorded by [TA] Josefina Alves, PTA 10/16/19 1311 [LW] Madeline Young STEPHENS/L 10/16/19 1155     Row Name 10/16/19 1030 10/16/19 0705          Sit-Stand Transfer    Sit-Stand [place] (Transfers)  supervision  -TA  contact guard  -LW     Assistive Device (Sit-Stand Transfers)  other (see comments) no AD  -TA  other (see comments) HHA  -LW     Recorded by [TA] Josefina Alves, PTA 10/16/19 1311 [LW] Madeline Young STEPHENS/L 10/16/19 1155     Row Name 10/16/19 1030 10/16/19 0705          Stand-Sit Transfer    Stand-Sit [place] (Transfers)  supervision  -TA  contact guard  -LW     Assistive Device (Stand-Sit Transfers)  other (see comments) no AD  -TA  other (see comments) HHA  -LW     Recorded by [TA] Josefina Alves, PTA 10/16/19 1311 [LW] Madeline Young STEPHENS/L 10/16/19 1155     Row Name 10/16/19 1030 10/16/19 0705          Toilet Transfer    Type (Toilet Transfer)  sit-stand;stand-sit  -TA  sit-stand;stand-sit  -LW     [place] Level (Toilet Transfer)  supervision  -TA  contact guard HHA  -LW     Assistive Device (Toilet Transfer)  --  commode;grab bars/safety frame  -LW     Recorded by [TA] Josefina Alves, PTA 10/16/19 1311 [LW] Madeline Young, STEPHENS/L 10/16/19 1155     Row Name 10/16/19 1030              Gait/Stairs Assessment/Training    [place] Level (Gait)  contact guard  -TA      Assistive Device (Gait)  other (see comments) no AD  -TA      Distance in Feet (Gait)  450  -TA      Pattern (Gait)  step-through  -TA      Deviations/Abnormal Patterns (Gait)  sol decreased;gait speed decreased  -TA      Recorded by [TA] Josefina Alves, PTA 10/16/19 1311      Row Name 10/16/19 0705             ADL Assessment/Intervention    BADL Assessment/Intervention  grooming;feeding;toileting  -LW      Recorded by [LW] Madeline Young COTA/L 10/16/19 1155      Row Name 10/16/19 0705             Grooming Assessment/Training    [place] Level (Grooming)  grooming skills;hair care, combing/brushing;wash face, hands;contact guard assist  -LW      Grooming Position  supported sitting  -LW      Recorded by [LW] Madeline Young COTA/L 10/16/19 1155      Row Name 10/16/19 0705             Self-Feeding Assessment/Training    [place] Level (Feeding)  liquids to mouth;scoop food and bring to mouth;supervision;set up  -LW      Self-Feeding Assess/Train, Spillage Amount  minimal  -LW      Position (Self-Feeding)  supported sitting  -LW      Comment (Feeding)  Pt needing assistance for tray setup  -LW      Recorded by [LW] Madeline Young COTA/L 10/16/19 1155      Row Name 10/16/19 0705             Toileting Assessment/Training    [place] Level (Toileting)  toileting skills;adjust/manage clothing;perform perineal hygiene;minimum assist (75% patient effort)  -LW      Assistive Devices (Toileting)  commode;grab bar/safety frame  -LW      Toileting Position  supported sitting  -LW      Comment (Toileting)  Pt needing assistance with pericare  -LW      Recorded by [LW] Madeline Young COTA/L 10/16/19 1155      Row Name 10/16/19 0705             BADL Safety/Performance    Impairments, BADL Safety/Performance  balance;endurance/activity tolerance  -LW      Recorded by [LW] Madeline Young COTA/L 10/16/19 1155      Row  Name 10/16/19 1030             Therapeutic Exercise    Lower Extremity (Therapeutic Exercise)  LAQ (long arc quad), bilateral;marching while seated  -TA      Lower Extremity Range of Motion (Therapeutic Exercise)  ankle dorsiflexion/plantar flexion, bilateral;hip abduction/adduction, bilateral  -TA      Exercise Type (Therapeutic Exercise)  AROM (active range of motion)  -TA      Position (Therapeutic Exercise)  seated  -TA      Sets/Reps (Therapeutic Exercise)  10  -TA      Expected Outcome (Therapeutic Exercise)  facilitate normal movement patterns;improve functional stability;improve motor control;increase active range of motion  -TA      Recorded by [TA] Josefina Alves, PTA 10/16/19 1311      Row Name 10/16/19 1030 10/16/19 0705          Positioning and Restraints    Pre-Treatment Position  sitting in chair/recliner  -TA  in bed  -LW     Post Treatment Position  chair  -TA  chair  -LW     In Chair  sitting;call light within reach;with family/caregiver  -TA  sitting;call light within reach;encouraged to call for assist;with family/caregiver  -LW     Recorded by [TA] Josefina Alves, WILLIAM 10/16/19 1311 [LW] Madeline Young STEPHENS/L 10/16/19 1155     Row Name 10/16/19 1030 10/16/19 0705          Pain Scale: Numbers Pre/Post-Treatment    Pain Scale: Numbers, Pretreatment  0/10 - no pain  -TA  0/10 - no pain  -LW     Pain Scale: Numbers, Post-Treatment  0/10 - no pain  -TA  0/10 - no pain  -LW     Recorded by [TA] Josefina Alves PTA 10/16/19 1311 [LW] Madeline Young STEPHENS/L 10/16/19 1155     Row Name 10/16/19 1030             Sensory Assessment/Intervention    Sensory General Assessment  other (see comments) NT due to deaf, non-verbal  -TA      Recorded by [TA] Josefina Alves PTA 10/16/19 1311      Row Name 10/16/19 1030             Vision Assessment/Intervention    Visual Impairment/Limitations  corrective lenses full time  -TA      Recorded by [TA] Josefina Alves PTA 10/16/19 1311      Row Name 10/16/19  0705             Coping    Observed Emotional State  cooperative  -LW      Verbalized Emotional State  acceptance  -LW      Recorded by [LW] Madeline Young COTA/L 10/16/19 1155      Row Name 10/16/19 0705             Plan of Care Review    Plan of Care Reviewed With  patient;sibling  -LW      Recorded by [LW] Madeline Young COTA/L 10/16/19 1155      Row Name 10/16/19 0705             Outcome Summary/Treatment Plan (OT)    Daily Summary of Progress (OT)  progress toward functional goals is good  -LW      Plan for Continued Treatment (OT)  Continue POC  -LW      Anticipated Discharge Disposition (OT)  anticipate therapy at next level of care  -LW      Recorded by [LW] Madeline Young COTA/L 10/16/19 1155      Row Name 10/16/19 1030             Outcome Summary/Treatment Plan (PT)    Daily Summary of Progress (PT)  progress toward functional goals is good  -TA      Plan for Continued Treatment (PT)  continue  -TA      Anticipated Discharge Disposition (PT)  home with 24/7 care  -TA      Recorded by [TA] Josefina Alves, WILLIAM 10/16/19 1311        User Key  (r) = Recorded By, (t) = Taken By, (c) = Cosigned By    Initials Name Effective Dates Discipline    TA Josefina Alves, PTA 03/07/18 -  PT    LW Madeline Young COTA/L 03/07/18 -  OT               Rehab Goal Summary     Row Name 10/16/19 1030 10/16/19 0705          Physical Therapy Goals    Bed Mobility Goal Selection (PT)  bed mobility, PT goal 1  -TA  --     Transfer Goal Selection (PT)  transfer, PT goal 1  -TA  --     Gait Training Goal Selection (PT)  gait training, PT goal 1  -TA  --        Bed Mobility Goal 1 (PT)    Activity/Assistive Device (Bed Mobility Goal 1, PT)  sit to supine/supine to sit  -TA  --     [place] Level/Cues Needed (Bed Mobility Goal 1, PT)  conditional independence  -TA  --     Time Frame (Bed Mobility Goal 1, PT)  by discharge  -TA  --     Barriers (Bed Mobility Goal 1, PT)  deaf, nonverbal  -TA  --     Progress/Outcomes (Bed  Mobility Goal 1, PT)  goal not met  -TA  --        Transfer Goal 1 (PT)    Activity/Assistive Device (Transfer Goal 1, PT)  sit-to-stand/stand-to-sit  -TA  --     [place] Level/Cues Needed (Transfer Goal 1, PT)  standby assist  -TA  --     Time Frame (Transfer Goal 1, PT)  by discharge  -TA  --     Barriers (Transfers Goal 1, PT)  deaf, nonverbal  -TA  --     Progress/Outcome (Transfer Goal 1, PT)  goal met  (Significant)   -TA  --        Gait Training Goal 1 (PT)    Activity/Assistive Device (Gait Training Goal 1, PT)  gait (walking locomotion);other (see comments) no AD  -TA  --     [place] Level (Gait Training Goal 1, PT)  supervision required  -TA  --     Distance (Gait Goal 1, PT)  250' x 2  -TA  --     Time Frame (Gait Training Goal 1, PT)  by discharge  -TA  --     Barriers (Gait Training Goal 1, PT)  deaf, nonverbal  -TA  --     Progress/Outcome (Gait Training Goal 1, PT)  goal not met  -TA  --        Occupational Therapy Goals    Transfer Goal Selection (OT)  --  transfer, OT goal 1  -LW     Bathing Goal Selection (OT)  --  bathing, OT goal 1  -LW     Dressing Goal Selection (OT)  --  dressing, OT goal 1  -LW     Toileting Goal Selection (OT)  --  toileting, OT goal 1  -LW     Grooming Goal Selection (OT)  --  grooming, OT goal 1  -LW     Self-Feeding Goal Selection (OT)  --  self feeding, OT goal 1  -LW     Functional Mobility Goal Selection (OT)  --  functional mobility, OT goal 1  -LW        Transfer Goal 1 (OT)    Activity/Assistive Device (Transfer Goal 1, OT)  --  toilet  -LW     [place] Level/Cues Needed (Transfer Goal 1, OT)  --  standby assist  -LW     Time Frame (Transfer Goal 1, OT)  --  long term goal (LTG);by discharge  -LW     Progress/Outcome (Transfer Goal 1, OT)  --  goal met  (Significant)   -LW        Bathing Goal 1 (OT)    Activity/Assistive Device (Bathing Goal 1, OT)  --  bathing skills, all  -LW     [place] Level/Cues Needed (Bathing Goal 1, OT)  --  set-up  required;standby assist;tactile cues required  -LW     Time Frame (Bathing Goal 1, OT)  --  long term goal (LTG);by discharge  -LW     Progress/Outcomes (Bathing Goal 1, OT)  --  goal met  (Significant)   -LW        Dressing Goal 1 (OT)    Activity/Assistive Device (Dressing Goal 1, OT)  --  dressing skills, all  -LW     [place]/Cues Needed (Dressing Goal 1, OT)  --  set-up required;tactile cues required;standby assist  -LW     Time Frame (Dressing Goal 1, OT)  --  long term goal (LTG);by discharge  -LW     Progress/Outcome (Dressing Goal 1, OT)  --  goal met  (Significant)   -LW        Toileting Goal 1 (OT)    Activity/Device (Toileting Goal 1, OT)  --  toileting skills, all  -LW     [place] Level/Cues Needed (Toileting Goal 1, OT)  --  set-up required;tactile cues required;standby assist  -LW     Time Frame (Toileting Goal 1, OT)  --  long term goal (LTG);by discharge  -LW     Progress/Outcome (Toileting Goal 1, OT)  --  goal not met  -LW        Grooming Goal 1 (OT)    Activity/Device (Grooming Goal 1, OT)  --  grooming skills, all  -LW     [place] (Grooming Goal 1, OT)  --  set-up required;tactile cues required;standby assist  -LW     Time Frame (Grooming Goal 1, OT)  --  long term goal (LTG);by discharge  -LW     Progress/Outcome (Grooming Goal 1, OT)  --  goal met  (Significant)   -LW        Self-Feeding Goal 1 (OT)    Activity/Assistive Device (Self-Feeding Goal 1, OT)  --  self-feeding skills, all  -LW     [place] Level/Cues Needed (Self-Feeding Goal 1, OT)  --  set-up required;tactile cues required;standby assist  -LW     Time Frame (Self-Feeding Goal 1, OT)  --  long term goal (LTG);by discharge  -LW     Progress/Outcomes (Self-Feeding Goal 1, OT)  --  goal not met  -LW        Functional Mobility Goal 1 (OT)    Activity/Assistive Device (Functional Mobility Goal 1, OT)  --  -- as needed  -LW     [place] Level/Cues Needed (Functional Mobility Goal 1, OT)  --  standby assist  -LW      Distance Goal 1 (Functional Mobility, OT)  --  for ADL tasks with no LOB and fair safety   -LW     Time Frame (Functional Mobility Goal 1, OT)  --  long term goal (LTG);by discharge  -LW     Progress/Outcome (Functional Mobility Goal 1, OT)  --  goal not met  -LW       User Key  (r) = Recorded By, (t) = Taken By, (c) = Cosigned By    Initials Name Provider Type Discipline    TA Josefina Alves, PTA Physical Therapy Assistant PT    Madeline Long, KAT/L Occupational Therapy Assistant OT              PT Recommendation and Plan  Anticipated Discharge Disposition (PT): home with 24/7 care  Therapy Frequency (PT Clinical Impression): daily  Outcome Summary/Treatment Plan (PT)  Daily Summary of Progress (PT): progress toward functional goals is good  Plan for Continued Treatment (PT): continue  Anticipated Discharge Disposition (PT): home with 24/7 care  Progress: improving  Outcome Summary: pt sit<>stand with SBA, pt ambulated 450` without AD with CGA. pt will require 24/7 care @ D/C  Outcome Measures     Row Name 10/16/19 1300 10/16/19 0705 10/15/19 1700       How much help from another person do you currently need...    Turning from your back to your side while in flat bed without using bedrails?  3  -TA  --  3  -TA    Moving from lying on back to sitting on the side of a flat bed without bedrails?  3  -TA  --  3  -TA    Moving to and from a bed to a chair (including a wheelchair)?  3  -TA  --  3  -TA    Standing up from a chair using your arms (e.g., wheelchair, bedside chair)?  3  -TA  --  3  -TA    Climbing 3-5 steps with a railing?  3  -TA  --  3  -TA    To walk in hospital room?  3  -TA  --  3  -TA    AM-PAC 6 Clicks Score (PT)  18  -TA  --  18  -TA       How much help from another is currently needed...    Putting on and taking off regular lower body clothing?  --  3  -LW  --    Bathing (including washing, rinsing, and drying)  --  3  -LW  --    Toileting (which includes using toilet bed pan or urinal)  --   3  -LW  --    Putting on and taking off regular upper body clothing  --  3  -LW  --    Taking care of personal grooming (such as brushing teeth)  --  3  -LW  --    Eating meals  --  3  -LW  --    AM-PAC 6 Clicks Score (OT)  --  18  -LW  --       Functional Assessment    Outcome Measure Options  AM-PAC 6 Clicks Basic Mobility (PT)  -TA  --  AM-PAC 6 Clicks Basic Mobility (PT)  -TA    Row Name 10/15/19 1240 10/14/19 1323 10/14/19 1300       How much help from another person do you currently need...    Turning from your back to your side while in flat bed without using bedrails?  --  --  3  -TA    Moving from lying on back to sitting on the side of a flat bed without bedrails?  --  --  3  -TA    Moving to and from a bed to a chair (including a wheelchair)?  --  --  3  -TA    Standing up from a chair using your arms (e.g., wheelchair, bedside chair)?  --  --  3  -TA    Climbing 3-5 steps with a railing?  --  --  3  -TA    To walk in hospital room?  --  --  3  -TA    AM-PAC 6 Clicks Score (PT)  --  --  18  -TA       How much help from another is currently needed...    Putting on and taking off regular lower body clothing?  3  -LW  3  -BH  --    Bathing (including washing, rinsing, and drying)  3  -LW  3  -BH  --    Toileting (which includes using toilet bed pan or urinal)  3  -LW  3  -BH  --    Putting on and taking off regular upper body clothing  3  -LW  3  -BH  --    Taking care of personal grooming (such as brushing teeth)  3  -LW  3  -BH  --    Eating meals  3  -LW  3  -BH  --    AM-PAC 6 Clicks Score (OT)  18  -LW  18  -BH  --       Functional Assessment    Outcome Measure Options  --  AM-PAC 6 Clicks Daily Activity (OT)  -  AM-PAC 6 Clicks Basic Mobility (PT)  -TA      User Key  (r) = Recorded By, (t) = Taken By, (c) = Cosigned By    Initials Name Provider Type    Cielo Vora, OTR/L Occupational Therapist    Josefina Bahena, PTA Physical Therapy Assistant    Madeline Long, KAT/PAUL Occupational  Therapy Assistant         Time Calculation:   PT Charges     Row Name 10/16/19 1314             Time Calculation    Start Time  1030  -TA      Stop Time  1109  -TA      Time Calculation (min)  39 min  -TA         Time Calculation- PT    Total Timed Code Minutes- PT  39 minute(s)  -TA        User Key  (r) = Recorded By, (t) = Taken By, (c) = Cosigned By    Initials Name Provider Type    TA Josefina Alves PTA Physical Therapy Assistant        Therapy Charges for Today     Code Description Service Date Service Provider Modifiers Qty    69732521949 HC GAIT TRAINING EA 15 MIN 10/15/2019 Josefina Alves, PTA GP 1    06074787902 HC PT THERAPEUTIC ACT EA 15 MIN 10/15/2019 Josefina Alves, PTA GP 1    69716238911 HC PT THER PROC EA 15 MIN 10/15/2019 Josefina Alves, PTA GP 1    60282701499 HC GAIT TRAINING EA 15 MIN 10/16/2019 Josefina Alves, PTA GP 1    38471289296 HC PT THERAPEUTIC ACT EA 15 MIN 10/16/2019 Josefina Alves, PTA GP 1    40682268756 HC PT THER PROC EA 15 MIN 10/16/2019 Josefina Alves, PTA GP 1          PT G-Codes  Outcome Measure Options: AM-PAC 6 Clicks Basic Mobility (PT)  AM-PAC 6 Clicks Score (PT): 18  AM-PAC 6 Clicks Score (OT): 18    Josefina Alves PTA  10/16/2019

## 2019-10-16 NOTE — PLAN OF CARE
Problem: Patient Care Overview  Goal: Plan of Care Review  Outcome: Ongoing (interventions implemented as appropriate)   10/16/19 1548   Coping/Psychosocial   Plan of Care Reviewed With patient   Plan of Care Review   Progress no change   OTHER   Outcome Summary Pt reports no pain at this time. Mag 1.4; mag protocol ordered. Will continue to monitor.      Goal: Individualization and Mutuality  Outcome: Ongoing (interventions implemented as appropriate)      Problem: Sepsis/Septic Shock (Adult)  Goal: Signs and Symptoms of Listed Potential Problems Will be Absent, Minimized or Managed (Sepsis/Septic Shock)  Outcome: Ongoing (interventions implemented as appropriate)      Problem: Fall Risk (Adult)  Goal: Absence of Fall  Outcome: Ongoing (interventions implemented as appropriate)      Problem: Skin Injury Risk (Adult)  Goal: Skin Health and Integrity  Outcome: Ongoing (interventions implemented as appropriate)      Problem: Hearing Impairment/Deaf (Adult,Obstetrics,Pediatric)  Goal: Enhanced Communication  Outcome: Ongoing (interventions implemented as appropriate)      Problem: Renal Failure/Kidney Injury, Acute (Adult)  Goal: Signs and Symptoms of Listed Potential Problems Will be Absent, Minimized or Managed (Renal Failure/Kidney Injury, Acute)  Outcome: Ongoing (interventions implemented as appropriate)      Problem: Anxiety (Adult)  Goal: Identify Related Risk Factors and Signs and Symptoms  Outcome: Outcome(s) achieved Date Met: 10/16/19    Goal: Reduction/Resolution  Outcome: Ongoing (interventions implemented as appropriate)      Problem: Pneumonia (Adult)  Goal: Signs and Symptoms of Listed Potential Problems Will be Absent, Minimized or Managed (Pneumonia)  Outcome: Ongoing (interventions implemented as appropriate)      Problem: Activity Intolerance (Adult)  Goal: Identify Related Risk Factors and Signs and Symptoms  Outcome: Outcome(s) achieved Date Met: 10/16/19    Goal: Activity Tolerance  Outcome:  Ongoing (interventions implemented as appropriate)    Goal: Effective Energy Conservation Techniques  Outcome: Ongoing (interventions implemented as appropriate)      Problem: Fluid Volume Excess (Adult)  Goal: Identify Related Risk Factors and Signs and Symptoms  Outcome: Outcome(s) achieved Date Met: 10/16/19    Goal: Optimal Fluid Balance  Outcome: Ongoing (interventions implemented as appropriate)

## 2019-10-16 NOTE — NURSING NOTE
"Called DOMINIQUE Choi at [facility]. RN states \"She hasn't had her flu vaccine this season.\" Informed RN pt will receive her flu vaccine today.   "

## 2019-10-16 NOTE — THERAPY TREATMENT NOTE
Acute Care - Occupational Therapy Treatment Note  [facility]     Patient Name: Melissa Parham  : 1945  MRN: [id number]  Today's Date: 10/16/2019  Onset of Illness/Injury or Date of Surgery: 10/10/19  Date of Referral to OT: 10/10/19  Referring Physician: Renetta PRITCHARD    Admit Date: 10/10/2019       ICD-10-CM ICD-9-CM   1. Acute pyelonephritis N10 590.10   2. Impaired functional mobility and endurance Z74.09 V49.89   3. Impaired mobility and ADLs Z74.09 799.89     Patient Active Problem List   Diagnosis   • Acute pyelonephritis     Past Medical History:   Diagnosis Date   • Arthritis    • CHF (congestive heart failure) (CMS/HCC)    • Deaf    • Diabetes mellitus (CMS/HCC)    • Elevated cholesterol    • Hypertension    • Nonverbal      History reviewed. No pertinent surgical history.    Therapy Treatment    Rehabilitation Treatment Summary     Row Name 10/16/19 0705             Treatment Time/Intention    Discipline  occupational therapy assistant  -LW      Document Type  therapy note (daily note)  -LW      Subjective Information  no complaints  -LW      Mode of Treatment  occupational therapy  -LW      Patient/Family Observations  Pt supine in bed. Sister present.   -LW      Care Plan Review  care plan/treatment goals reviewed  -LW      Total Minutes, Occupational Therapy Treatment  60  -LW      Therapy Frequency (OT Eval)  other (see comments) 5-7 days per week  -LW      Patient Effort  adequate  -LW      Existing Precautions/Restrictions  other (see comments) deaf, non verbal  -LW      Equipment Issued to Patient  gait belt  -LW      Recorded by [LW] Madeline Young COTA/L 10/16/19 1155      Row Name 10/16/19 0705             Vital Signs    Pre Systolic BP Rehab  151  -LW      Pre Treatment Diastolic BP  65  -LW      Pretreatment Heart Rate (beats/min)  77  -LW      Posttreatment Heart Rate (beats/min)  79  -LW      Pre SpO2 (%)  96  -LW      O2 Delivery Pre Treatment  room air  -LW       Post SpO2 (%)  95  -LW      O2 Delivery Post Treatment  room air  -LW      Pre Patient Position  Supine  -LW      Post Patient Position  Sitting  -LW      Recorded by [LW] Madeline Young COTA/L 10/16/19 1155      Row Name 10/16/19 0705             Cognitive Assessment/Intervention- PT/OT    Affect/Mental Status (Cognitive)  WFL  -LW      Orientation Status (Cognition)  unable/difficult to assess deaf, non-verbal  -LW      Follows Commands (Cognition)  follows one step commands;75-90% accuracy  -LW      Safety Deficit (Cognitive)  moderate deficit  -LW      Personal Safety Interventions  fall prevention program maintained;gait belt;nonskid shoes/slippers when out of bed  -LW      Recorded by [LW] Madeline Young COTA/L 10/16/19 1155      Row Name 10/16/19 0705             Safety Issues, Functional Mobility    Safety Issues Affecting Function (Mobility)  ability to follow commands  -LW      Impairments Affecting Function (Mobility)  balance;endurance/activity tolerance;strength  -LW      Recorded by [LW] Madeline Young COTA/L 10/16/19 1155      Row Name 10/16/19 0705             Bed Mobility Assessment/Treatment    Bed Mobility Assessment/Treatment  supine-sit  -LW      Supine-Sit [place] (Bed Mobility)  contact guard  -LW      Assistive Device (Bed Mobility)  bed rails  -LW      Recorded by [LW] Madeline Young COTA/L 10/16/19 1155      Row Name 10/16/19 0705             Functional Mobility    Functional Mobility- Ind. Level  contact guard assist  -LW      Functional Mobility- Device  other (see comments) No AD HHA  -LW      Recorded by [LW] Madeline Young COTA/L 10/16/19 1155      Row Name 10/16/19 0705             Transfer Assessment/Treatment    Transfer Assessment/Treatment  sit-stand transfer;stand-sit transfer;bed-chair transfer;toilet transfer  -LW      Recorded by [LW] Madeline Young COTA/L 10/16/19 1155      Row Name 10/16/19 0705             Sit-Stand Transfer    Sit-Stand [place]  (Transfers)  contact guard  -LW      Assistive Device (Sit-Stand Transfers)  other (see comments) HHA  -LW      Recorded by [LW] Madeline Young COTA/L 10/16/19 1155      Row Name 10/16/19 0705             Stand-Sit Transfer    Stand-Sit [place] (Transfers)  contact guard  -LW      Assistive Device (Stand-Sit Transfers)  other (see comments) HHA  -LW      Recorded by [LW] Madeline Young COTA/L 10/16/19 115      Row Name 10/16/19 0705             Toilet Transfer    Type (Toilet Transfer)  sit-stand;stand-sit  -LW      [place] Level (Toilet Transfer)  contact guard HHA  -LW      Assistive Device (Toilet Transfer)  commode;grab bars/safety frame  -LW      Recorded by [LW] Madeline Young COTA/L 10/16/19 1155      Row Name 10/16/19 0705             ADL Assessment/Intervention    BADL Assessment/Intervention  grooming;feeding;toileting  -LW      Recorded by [LW] Madeline Young COTA/L 10/16/19 115      Row Name 10/16/19 0705             Grooming Assessment/Training    [place] Level (Grooming)  grooming skills;hair care, combing/brushing;wash face, hands;contact guard assist  -LW      Grooming Position  supported sitting  -LW      Recorded by [LW] Madeline Young COTA/L 10/16/19 1155      Row Name 10/16/19 0705             Self-Feeding Assessment/Training    [place] Level (Feeding)  liquids to mouth;scoop food and bring to mouth;supervision;set up  -LW      Self-Feeding Assess/Train, Spillage Amount  minimal  -LW      Position (Self-Feeding)  supported sitting  -LW      Comment (Feeding)  Pt needing assistance for tray setup  -LW      Recorded by [LW] Madeline Young STEPHENS/L 10/16/19 1155      Row Name 10/16/19 0705             Toileting Assessment/Training    [place] Level (Toileting)  toileting skills;adjust/manage clothing;perform perineal hygiene;minimum assist (75% patient effort)  -LW      Assistive Devices (Toileting)  commode;grab bar/safety frame  -LW      Toileting Position   supported sitting  -LW      Comment (Toileting)  Pt needing assistance with pericare  -LW      Recorded by [LW] Madeline Young COTA/L 10/16/19 1155      Row Name 10/16/19 0705             BADL Safety/Performance    Impairments, BADL Safety/Performance  balance;endurance/activity tolerance  -LW      Recorded by [LW] Madeline Young COTA/L 10/16/19 1155      Row Name 10/16/19 0705             Positioning and Restraints    Pre-Treatment Position  in bed  -LW      Post Treatment Position  chair  -LW      In Chair  sitting;call light within reach;encouraged to call for assist;with family/caregiver  -LW      Recorded by [LW] Madeline Young COTA/L 10/16/19 1155      Row Name 10/16/19 0705             Pain Scale: Numbers Pre/Post-Treatment    Pain Scale: Numbers, Pretreatment  0/10 - no pain  -LW      Pain Scale: Numbers, Post-Treatment  0/10 - no pain  -LW      Recorded by [LW] Madeline Young COTA/L 10/16/19 1155      Row Name 10/16/19 0705             Coping    Observed Emotional State  cooperative  -LW      Verbalized Emotional State  acceptance  -LW      Recorded by [LW] Madeline Young COTA/L 10/16/19 1155      Row Name 10/16/19 0705             Plan of Care Review    Plan of Care Reviewed With  patient;sibling  -LW      Recorded by [LW] Madeline Young COTA/L 10/16/19 1155      Row Name 10/16/19 0705             Outcome Summary/Treatment Plan (OT)    Daily Summary of Progress (OT)  progress toward functional goals is good  -LW      Plan for Continued Treatment (OT)  Continue POC  -LW      Anticipated Discharge Disposition (OT)  anticipate therapy at next level of care  -LW      Recorded by [LW] Madeline Young COTA/PAUL 10/16/19 1155        User Key  (r) = Recorded By, (t) = Taken By, (c) = Cosigned By    Initials Name Effective Dates Discipline    LW Madeline Young COTA/L 03/07/18 -  OT           Rehab Goal Summary     Row Name 10/16/19 0705             Occupational Therapy Goals    Transfer Goal  Selection (OT)  transfer, OT goal 1  -LW      Bathing Goal Selection (OT)  bathing, OT goal 1  -LW      Dressing Goal Selection (OT)  dressing, OT goal 1  -LW      Toileting Goal Selection (OT)  toileting, OT goal 1  -LW      Grooming Goal Selection (OT)  grooming, OT goal 1  -LW      Self-Feeding Goal Selection (OT)  self feeding, OT goal 1  -LW      Functional Mobility Goal Selection (OT)  functional mobility, OT goal 1  -LW         Transfer Goal 1 (OT)    Activity/Assistive Device (Transfer Goal 1, OT)  toilet  -LW      [place] Level/Cues Needed (Transfer Goal 1, OT)  standby assist  -LW      Time Frame (Transfer Goal 1, OT)  long term goal (LTG);by discharge  -LW      Progress/Outcome (Transfer Goal 1, OT)  goal met  (Significant)   -LW         Bathing Goal 1 (OT)    Activity/Assistive Device (Bathing Goal 1, OT)  bathing skills, all  -LW      [place] Level/Cues Needed (Bathing Goal 1, OT)  set-up required;standby assist;tactile cues required  -LW      Time Frame (Bathing Goal 1, OT)  long term goal (LTG);by discharge  -LW      Progress/Outcomes (Bathing Goal 1, OT)  goal met  (Significant)   -LW         Dressing Goal 1 (OT)    Activity/Assistive Device (Dressing Goal 1, OT)  dressing skills, all  -LW      [place]/Cues Needed (Dressing Goal 1, OT)  set-up required;tactile cues required;standby assist  -LW      Time Frame (Dressing Goal 1, OT)  long term goal (LTG);by discharge  -LW      Progress/Outcome (Dressing Goal 1, OT)  goal met  (Significant)   -LW         Toileting Goal 1 (OT)    Activity/Device (Toileting Goal 1, OT)  toileting skills, all  -LW      [place] Level/Cues Needed (Toileting Goal 1, OT)  set-up required;tactile cues required;standby assist  -LW      Time Frame (Toileting Goal 1, OT)  long term goal (LTG);by discharge  -LW      Progress/Outcome (Toileting Goal 1, OT)  goal not met  -LW         Grooming Goal 1 (OT)    Activity/Device (Grooming Goal 1, OT)  grooming skills,  all  -LW      [place] (Grooming Goal 1, OT)  set-up required;tactile cues required;standby assist  -LW      Time Frame (Grooming Goal 1, OT)  long term goal (LTG);by discharge  -LW      Progress/Outcome (Grooming Goal 1, OT)  goal met  (Significant)   -LW         Self-Feeding Goal 1 (OT)    Activity/Assistive Device (Self-Feeding Goal 1, OT)  self-feeding skills, all  -LW      [place] Level/Cues Needed (Self-Feeding Goal 1, OT)  set-up required;tactile cues required;standby assist  -LW      Time Frame (Self-Feeding Goal 1, OT)  long term goal (LTG);by discharge  -LW      Progress/Outcomes (Self-Feeding Goal 1, OT)  goal not met  -LW         Functional Mobility Goal 1 (OT)    Activity/Assistive Device (Functional Mobility Goal 1, OT)  -- as needed  -LW      [place] Level/Cues Needed (Functional Mobility Goal 1, OT)  standby assist  -LW      Distance Goal 1 (Functional Mobility, OT)  for ADL tasks with no LOB and fair safety   -LW      Time Frame (Functional Mobility Goal 1, OT)  long term goal (LTG);by discharge  -LW      Progress/Outcome (Functional Mobility Goal 1, OT)  goal not met  -LW        User Key  (r) = Recorded By, (t) = Taken By, (c) = Cosigned By    Initials Name Provider Type Discipline    Madeline Long COTA/L Occupational Therapy Assistant OT        Occupational Therapy Education     Title: PT OT SLP Therapies (Done)     Topic: Occupational Therapy (Done)     Point: ADL training (Done)     Description: Instruct learner(s) on proper safety adaptation and remediation techniques during self care or transfers.   Instruct in proper use of assistive devices.    Learning Progress Summary           Patient Acceptance, E,TB, VU by  at 10/16/2019 11:56 AM    Acceptance, E,TB, VU by  at 10/15/2019  4:03 PM    Acceptance, E, VU,NR by  at 10/14/2019  3:12 PM    Comment:  Educated about OT and POC. Educated to call for assist. Educated on safety throughout.   Family Acceptance, E, VU,NR by   at 10/14/2019  3:12 PM    Comment:  Educated about OT and POC. Educated to call for assist. Educated on safety throughout.                   Point: Home exercise program (Done)     Description: Instruct learner(s) on appropriate technique for monitoring, assisting and/or progressing therapeutic exercises/activities.    Learning Progress Summary           Patient Acceptance, E,TB, VU by  at 10/16/2019 11:56 AM    Acceptance, E,TB, VU by  at 10/15/2019  4:03 PM                   Point: Precautions (Done)     Description: Instruct learner(s) on prescribed precautions during self-care and functional transfers.    Learning Progress Summary           Patient Acceptance, E,TB, VU by  at 10/16/2019 11:56 AM    Acceptance, E,TB, VU by  at 10/15/2019  4:03 PM    Acceptance, E, VU,NR by  at 10/14/2019  3:12 PM    Comment:  Educated about OT and POC. Educated to call for assist. Educated on safety throughout.   Family Acceptance, E, VU,NR by  at 10/14/2019  3:12 PM    Comment:  Educated about OT and POC. Educated to call for assist. Educated on safety throughout.                   Point: Body mechanics (Done)     Description: Instruct learner(s) on proper positioning and spine alignment during self-care, functional mobility activities and/or exercises.    Learning Progress Summary           Patient Acceptance, E,TB, VU by  at 10/16/2019 11:56 AM    Acceptance, E,TB, VU by  at 10/15/2019  4:03 PM                               User Key     Initials Effective Dates Name Provider Type Discipline     06/08/18 -  Cielo Stewart OTR/L Occupational Therapist OT     03/07/18 -  Madeline Young COTA/L Occupational Therapy Assistant OT                OT Recommendation and Plan  Outcome Summary/Treatment Plan (OT)  Daily Summary of Progress (OT): progress toward functional goals is good  Plan for Continued Treatment (OT): Continue POC  Anticipated Discharge Disposition (OT): anticipate therapy at next level of  care  Therapy Frequency (OT Eval): other (see comments)(5-7 days per week)  Daily Summary of Progress (OT): progress toward functional goals is good  Plan of Care Review  Plan of Care Reviewed With: patient  Plan of Care Reviewed With: patient  Outcome Summary: Pt needing increased time for hearing impairment. Pt sit to stand t/f to bathroom with CGA. Toilet to chair with CGA. Pt needing Min A with Bathroom tasks. Pt performed feeding with Supervision but needed tray setup. Grooming with CGA.   Outcome Measures     Row Name 10/16/19 0705 10/15/19 1700 10/15/19 1240       How much help from another person do you currently need...    Turning from your back to your side while in flat bed without using bedrails?  --  3  -TA  --    Moving from lying on back to sitting on the side of a flat bed without bedrails?  --  3  -TA  --    Moving to and from a bed to a chair (including a wheelchair)?  --  3  -TA  --    Standing up from a chair using your arms (e.g., wheelchair, bedside chair)?  --  3  -TA  --    Climbing 3-5 steps with a railing?  --  3  -TA  --    To walk in hospital room?  --  3  -TA  --    AM-PAC 6 Clicks Score (PT)  --  18  -TA  --       How much help from another is currently needed...    Putting on and taking off regular lower body clothing?  3  -LW  --  3  -LW    Bathing (including washing, rinsing, and drying)  3  -LW  --  3  -LW    Toileting (which includes using toilet bed pan or urinal)  3  -LW  --  3  -LW    Putting on and taking off regular upper body clothing  3  -LW  --  3  -LW    Taking care of personal grooming (such as brushing teeth)  3  -LW  --  3  -LW    Eating meals  3  -LW  --  3  -LW    AM-PAC 6 Clicks Score (OT)  18  -LW  --  18  -LW       Functional Assessment    Outcome Measure Options  --  AM-PAC 6 Clicks Basic Mobility (PT)  -TA  --    Row Name 10/14/19 1323 10/14/19 1300          How much help from another person do you currently need...    Turning from your back to your side while in  flat bed without using bedrails?  --  3  -TA     Moving from lying on back to sitting on the side of a flat bed without bedrails?  --  3  -TA     Moving to and from a bed to a chair (including a wheelchair)?  --  3  -TA     Standing up from a chair using your arms (e.g., wheelchair, bedside chair)?  --  3  -TA     Climbing 3-5 steps with a railing?  --  3  -TA     To walk in hospital room?  --  3  -TA     AM-PAC 6 Clicks Score (PT)  --  18  -TA        How much help from another is currently needed...    Putting on and taking off regular lower body clothing?  3  -BH  --     Bathing (including washing, rinsing, and drying)  3  -BH  --     Toileting (which includes using toilet bed pan or urinal)  3  -BH  --     Putting on and taking off regular upper body clothing  3  -BH  --     Taking care of personal grooming (such as brushing teeth)  3  -BH  --     Eating meals  3  -BH  --     AM-PAC 6 Clicks Score (OT)  18  -BH  --        Functional Assessment    Outcome Measure Options  AM-PAC 6 Clicks Daily Activity (OT)  -  AM-PAC 6 Clicks Basic Mobility (PT)  -TA       User Key  (r) = Recorded By, (t) = Taken By, (c) = Cosigned By    Initials Name Provider Type     Cielo Stewart, OTR/L Occupational Therapist    TA Josefina Alves, PTA Physical Therapy Assistant    Madeline Long COTA/L Occupational Therapy Assistant           Time Calculation:   Time Calculation- OT     Row Name 10/16/19 1159             Time Calculation- OT    OT Start Time  0705  -      OT Stop Time  0805  -      OT Time Calculation (min)  60 min  -      Total Timed Code Minutes- OT  60 minute(s)  -      OT Received On  10/16/19  -        User Key  (r) = Recorded By, (t) = Taken By, (c) = Cosigned By    Initials Name Provider Type    Madeline Long COTA/L Occupational Therapy Assistant        Therapy Charges for Today     Code Description Service Date Service Provider Modifiers Qty    [id number]  OT SELF CARE/MGMT/TRAIN EA 15  MIN 10/15/2019 Madeline Young COTA/L GO 4    [id number] HC OT SELF CARE/MGMT/TRAIN EA 15 MIN 10/16/2019 Madeline Young COTA/L GO 4            Non-skid socks and gait belt in place. Toileting offered. Call light and needs within reach. Pt advised to not get up alone and call the nurse for assistance.  Tag alarm on.     BETHANY Cisneros  10/16/2019

## 2019-10-16 NOTE — PLAN OF CARE
Problem: Patient Care Overview  Goal: Plan of Care Review  Outcome: Ongoing (interventions implemented as appropriate)   10/16/19 3706   Coping/Psychosocial   Plan of Care Reviewed With patient   Plan of Care Review   Progress no change   OTHER   Outcome Summary Pt needing increased time for hearing impairment. Pt sit to stand t/f to bathroom with CGA. Toilet to chair with CGA. Pt needing Min A with Bathroom tasks. Pt performed feeding with Supervision but needed tray setup. Grooming with CGA.
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
## 2019-10-16 NOTE — PLAN OF CARE
Problem: Patient Care Overview  Goal: Plan of Care Review  Outcome: Ongoing (interventions implemented as appropriate)   10/16/19 1030 10/16/19 1157   Coping/Psychosocial   Plan of Care Reviewed With --  patient   Plan of Care Review   Progress improving --    OTHER   Outcome Summary pt sit<>stand with SBA, pt ambulated 450` without AD with CGA. pt will require 24/7 care @ D/C --

## 2019-10-16 NOTE — PLAN OF CARE
Problem: Patient Care Overview  Goal: Plan of Care Review  Outcome: Ongoing (interventions implemented as appropriate)   10/15/19 2016   Coping/Psychosocial   Plan of Care Reviewed With patient   Plan of Care Review   Progress no change   OTHER   Outcome Summary pt resting , no complaints       Problem: Fall Risk (Adult)  Goal: Absence of Fall  Outcome: Outcome(s) achieved Date Met: 10/15/19      Problem: Skin Injury Risk (Adult)  Goal: Skin Health and Integrity  Outcome: Ongoing (interventions implemented as appropriate)      Problem: Hearing Impairment/Deaf (Adult,Obstetrics,Pediatric)  Goal: Enhanced Communication  Outcome: Ongoing (interventions implemented as appropriate)      Problem: Urinary Tract Infection (Adult)  Goal: Signs and Symptoms of Listed Potential Problems Will be Absent, Minimized or Managed (Urinary Tract Infection)  Outcome: Outcome(s) achieved Date Met: 10/15/19

## 2019-10-17 VITALS
BODY MASS INDEX: 27.72 KG/M2 | HEART RATE: 56 BPM | TEMPERATURE: 96.6 F | WEIGHT: 146.8 LBS | SYSTOLIC BLOOD PRESSURE: 124 MMHG | HEIGHT: 61 IN | OXYGEN SATURATION: 96 % | RESPIRATION RATE: 18 BRPM | DIASTOLIC BLOOD PRESSURE: 59 MMHG

## 2019-10-17 PROBLEM — J18.9 CAP (COMMUNITY ACQUIRED PNEUMONIA): Status: ACTIVE | Noted: 2019-10-17

## 2019-10-17 PROBLEM — I10 ESSENTIAL HYPERTENSION: Status: ACTIVE | Noted: 2019-10-17

## 2019-10-17 PROBLEM — N39.0 SEPSIS DUE TO URINARY TRACT INFECTION (HCC): Status: ACTIVE | Noted: 2019-10-17

## 2019-10-17 PROBLEM — A41.9 SEPSIS DUE TO URINARY TRACT INFECTION: Status: ACTIVE | Noted: 2019-10-17

## 2019-10-17 PROBLEM — E11.9 DMII (DIABETES MELLITUS, TYPE 2) (HCC): Status: ACTIVE | Noted: 2019-10-17

## 2019-10-17 LAB
ALBUMIN SERPL-MCNC: 3 G/DL (ref 3.5–5.2)
ALBUMIN/GLOB SERPL: 0.7 G/DL
ALP SERPL-CCNC: 120 U/L (ref 39–117)
ALT SERPL W P-5'-P-CCNC: 26 U/L (ref 1–33)
ANION GAP SERPL CALCULATED.3IONS-SCNC: 15 MMOL/L (ref 5–15)
AST SERPL-CCNC: 25 U/L (ref 1–32)
BACTERIA SPEC AEROBE CULT: NORMAL
BACTERIA SPEC AEROBE CULT: NORMAL
BASOPHILS # BLD AUTO: 0.03 10*3/MM3 (ref 0–0.2)
BASOPHILS NFR BLD AUTO: 0.4 % (ref 0–1.5)
BILIRUB SERPL-MCNC: 0.4 MG/DL (ref 0.2–1.2)
BUN BLD-MCNC: 16 MG/DL (ref 8–23)
BUN/CREAT SERPL: 16.8 (ref 7–25)
CALCIUM SPEC-SCNC: 9.4 MG/DL (ref 8.6–10.5)
CHLORIDE SERPL-SCNC: 99 MMOL/L (ref 98–107)
CO2 SERPL-SCNC: 27 MMOL/L (ref 22–29)
CREAT BLD-MCNC: 0.95 MG/DL (ref 0.57–1)
DEPRECATED RDW RBC AUTO: 38.3 FL (ref 37–54)
EOSINOPHIL # BLD AUTO: 0.19 10*3/MM3 (ref 0–0.4)
EOSINOPHIL NFR BLD AUTO: 2.8 % (ref 0.3–6.2)
ERYTHROCYTE [DISTWIDTH] IN BLOOD BY AUTOMATED COUNT: 13.3 % (ref 12.3–15.4)
GFR SERPL CREATININE-BSD FRML MDRD: 58 ML/MIN/1.73
GLOBULIN UR ELPH-MCNC: 4.5 GM/DL
GLUCOSE BLD-MCNC: 147 MG/DL (ref 65–99)
GLUCOSE BLDC GLUCOMTR-MCNC: 153 MG/DL (ref 70–130)
GLUCOSE BLDC GLUCOMTR-MCNC: 263 MG/DL (ref 70–130)
HCT VFR BLD AUTO: 31.5 % (ref 34–46.6)
HGB BLD-MCNC: 10.7 G/DL (ref 12–15.9)
IMM GRANULOCYTES # BLD AUTO: 0.31 10*3/MM3 (ref 0–0.05)
IMM GRANULOCYTES NFR BLD AUTO: 4.5 % (ref 0–0.5)
LYMPHOCYTES # BLD AUTO: 1.18 10*3/MM3 (ref 0.7–3.1)
LYMPHOCYTES NFR BLD AUTO: 17.2 % (ref 19.6–45.3)
MAGNESIUM SERPL-MCNC: 2.2 MG/DL (ref 1.6–2.4)
MCH RBC QN AUTO: 27.2 PG (ref 26.6–33)
MCHC RBC AUTO-ENTMCNC: 34 G/DL (ref 31.5–35.7)
MCV RBC AUTO: 79.9 FL (ref 79–97)
MONOCYTES # BLD AUTO: 0.58 10*3/MM3 (ref 0.1–0.9)
MONOCYTES NFR BLD AUTO: 8.4 % (ref 5–12)
NEUTROPHILS # BLD AUTO: 4.58 10*3/MM3 (ref 1.7–7)
NEUTROPHILS NFR BLD AUTO: 66.7 % (ref 42.7–76)
NRBC BLD AUTO-RTO: 0 /100 WBC (ref 0–0.2)
PLATELET # BLD AUTO: 271 10*3/MM3 (ref 140–450)
PMV BLD AUTO: 8.9 FL (ref 6–12)
POTASSIUM BLD-SCNC: 4.1 MMOL/L (ref 3.5–5.2)
PROT SERPL-MCNC: 7.5 G/DL (ref 6–8.5)
RBC # BLD AUTO: 3.94 10*6/MM3 (ref 3.77–5.28)
SODIUM BLD-SCNC: 141 MMOL/L (ref 136–145)
WBC NRBC COR # BLD: 6.87 10*3/MM3 (ref 3.4–10.8)

## 2019-10-17 PROCEDURE — 97535 SELF CARE MNGMENT TRAINING: CPT

## 2019-10-17 PROCEDURE — 25010000002 FUROSEMIDE PER 20 MG: Performed by: NURSE PRACTITIONER

## 2019-10-17 PROCEDURE — 80053 COMPREHEN METABOLIC PANEL: CPT | Performed by: NURSE PRACTITIONER

## 2019-10-17 PROCEDURE — 63710000001 INSULIN DETEMIR PER 5 UNITS: Performed by: NURSE PRACTITIONER

## 2019-10-17 PROCEDURE — 85025 COMPLETE CBC W/AUTO DIFF WBC: CPT | Performed by: NURSE PRACTITIONER

## 2019-10-17 PROCEDURE — 97110 THERAPEUTIC EXERCISES: CPT

## 2019-10-17 PROCEDURE — 63710000001 INSULIN ASPART PER 5 UNITS: Performed by: NURSE PRACTITIONER

## 2019-10-17 PROCEDURE — 25010000002 HEPARIN (PORCINE) PER 1000 UNITS: Performed by: NURSE PRACTITIONER

## 2019-10-17 PROCEDURE — 83735 ASSAY OF MAGNESIUM: CPT | Performed by: INTERNAL MEDICINE

## 2019-10-17 PROCEDURE — 25010000002 CEFEPIME PER 500 MG: Performed by: NURSE PRACTITIONER

## 2019-10-17 PROCEDURE — 82962 GLUCOSE BLOOD TEST: CPT

## 2019-10-17 PROCEDURE — 97116 GAIT TRAINING THERAPY: CPT

## 2019-10-17 RX ORDER — CEFPODOXIME PROXETIL 200 MG/1
200 TABLET, FILM COATED ORAL 2 TIMES DAILY
Qty: 14 TABLET | Refills: 0
Start: 2019-10-17 | End: 2019-10-24

## 2019-10-17 RX ORDER — HYDROCODONE BITARTRATE AND ACETAMINOPHEN 5; 325 MG/1; MG/1
1 TABLET ORAL NIGHTLY
Qty: 3 TABLET | Refills: 0 | Status: SHIPPED | OUTPATIENT
Start: 2019-10-17

## 2019-10-17 RX ORDER — CLONAZEPAM 0.5 MG/1
0.5 TABLET ORAL 2 TIMES DAILY
Qty: 6 TABLET | Refills: 0 | Status: SHIPPED | OUTPATIENT
Start: 2019-10-17 | End: 2022-08-04

## 2019-10-17 RX ADMIN — CLOPIDOGREL BISULFATE 75 MG: 75 TABLET ORAL at 08:39

## 2019-10-17 RX ADMIN — CEFEPIME HYDROCHLORIDE 2 G: 2 INJECTION, POWDER, FOR SOLUTION INTRAVENOUS at 04:33

## 2019-10-17 RX ADMIN — METOPROLOL TARTRATE 50 MG: 50 TABLET ORAL at 08:39

## 2019-10-17 RX ADMIN — HYDROCORTISONE: 1 CREAM TOPICAL at 08:44

## 2019-10-17 RX ADMIN — INSULIN ASPART 6 UNITS: 100 INJECTION, SOLUTION INTRAVENOUS; SUBCUTANEOUS at 12:08

## 2019-10-17 RX ADMIN — FUROSEMIDE 20 MG: 10 INJECTION, SOLUTION INTRAVENOUS at 04:33

## 2019-10-17 RX ADMIN — RISPERIDONE 0.25 MG: 0.25 TABLET ORAL at 08:39

## 2019-10-17 RX ADMIN — CLONAZEPAM 0.5 MG: 0.5 TABLET ORAL at 08:41

## 2019-10-17 RX ADMIN — SERTRALINE HYDROCHLORIDE 25 MG: 25 TABLET ORAL at 08:39

## 2019-10-17 RX ADMIN — HEPARIN SODIUM 5000 UNITS: 5000 INJECTION INTRAVENOUS; SUBCUTANEOUS at 08:39

## 2019-10-17 RX ADMIN — SODIUM CHLORIDE, PRESERVATIVE FREE 10 ML: 5 INJECTION INTRAVENOUS at 08:39

## 2019-10-17 RX ADMIN — INSULIN DETEMIR 15 UNITS: 100 INJECTION, SOLUTION SUBCUTANEOUS at 08:39

## 2019-10-17 RX ADMIN — INSULIN ASPART 2 UNITS: 100 INJECTION, SOLUTION INTRAVENOUS; SUBCUTANEOUS at 08:38

## 2019-10-17 RX ADMIN — CETIRIZINE HYDROCHLORIDE 5 MG: 5 TABLET ORAL at 08:38

## 2019-10-17 RX ADMIN — PANTOPRAZOLE SODIUM 40 MG: 40 TABLET, DELAYED RELEASE ORAL at 06:28

## 2019-10-17 NOTE — PLAN OF CARE
Problem: Patient Care Overview  Goal: Plan of Care Review  Outcome: Ongoing (interventions implemented as appropriate)   10/16/19 1548   Coping/Psychosocial   Plan of Care Reviewed With patient   Plan of Care Review   Progress no change     Goal: Individualization and Mutuality  Outcome: Ongoing (interventions implemented as appropriate)    Goal: Discharge Needs Assessment  Outcome: Ongoing (interventions implemented as appropriate)   10/14/19 1030   Discharge Needs Assessment   Readmission Within the Last 30 Days no previous admission in last 30 days   Concerns to be Addressed adjustment to diagnosis/illness   Patient/Family Anticipates Transition to long term care facility   Patient/Family Anticipated Services at Transition skilled nursing   Transportation Anticipated family or friend will provide;health plan transportation   Anticipated Changes Related to Illness none   Equipment Needed After Discharge none   Outpatient/Agency/Support Group Needs skilled nursing facility   Discharge Facility/Level of Care Needs nursing facility, skilled   Offered/Gave Vendor List yes   Patient's Choice of Community Agency(s) [facility]   Current Discharge Risk chronically ill   Disability   Equipment Currently Used at Home other (see comments)  (has all DME needed @ SNF)     Goal: Interprofessional Rounds/Family Conf  Outcome: Ongoing (interventions implemented as appropriate)   10/17/19 0118   Interdisciplinary Rounds/Family Conf   Participants nursing;patient       Problem: Sepsis/Septic Shock (Adult)  Goal: Signs and Symptoms of Listed Potential Problems Will be Absent, Minimized or Managed (Sepsis/Septic Shock)  Outcome: Ongoing (interventions implemented as appropriate)   10/17/19 0118   Goal/Outcome Evaluation   Problems Assessed (Sepsis) all   Problems Present (Sepsis) situational response       Problem: Fall Risk (Adult)  Goal: Absence of Fall  Outcome: Ongoing (interventions implemented as appropriate)    10/15/19 2016   Fall Risk (Adult)   Absence of Fall achieves outcome       Problem: Skin Injury Risk (Adult)  Goal: Skin Health and Integrity  Outcome: Ongoing (interventions implemented as appropriate)   10/14/19 0117   Skin Injury Risk (Adult)   Skin Health and Integrity making progress toward outcome       Problem: Hearing Impairment/Deaf (Adult,Obstetrics,Pediatric)  Goal: Enhanced Communication  Outcome: Ongoing (interventions implemented as appropriate)   10/14/19 0117   Hearing Impairment/Deaf (Adult,Obstetrics,Pediatric)   Enhanced Communication making progress toward outcome       Problem: Renal Failure/Kidney Injury, Acute (Adult)  Goal: Signs and Symptoms of Listed Potential Problems Will be Absent, Minimized or Managed (Renal Failure/Kidney Injury, Acute)  Outcome: Ongoing (interventions implemented as appropriate)   10/17/19 0118   Goal/Outcome Evaluation   Problems Assessed (Acute Renal Failure/Kidney Injury) all   Problems Present (ARF/Kidney Injury) infection       Problem: Anxiety (Adult)  Goal: Reduction/Resolution  Outcome: Ongoing (interventions implemented as appropriate)   10/17/19 0118   Anxiety (Adult)   Reduction/Resolution making progress toward outcome       Problem: Pneumonia (Adult)  Goal: Signs and Symptoms of Listed Potential Problems Will be Absent, Minimized or Managed (Pneumonia)  Outcome: Ongoing (interventions implemented as appropriate)   10/17/19 0118   Goal/Outcome Evaluation   Problems Assessed (Pneumonia) all       Problem: Activity Intolerance (Adult)  Goal: Activity Tolerance  Outcome: Ongoing (interventions implemented as appropriate)   10/17/19 0118   Activity Intolerance (Adult)   Activity Tolerance making progress toward outcome     Goal: Effective Energy Conservation Techniques  Outcome: Ongoing (interventions implemented as appropriate)   10/17/19 0118   Activity Intolerance (Adult)   Effective Energy Conservation Techniques making progress toward outcome        Problem: Fluid Volume Excess (Adult)  Goal: Optimal Fluid Balance  Outcome: Ongoing (interventions implemented as appropriate)   10/17/19 0118   Fluid Volume Excess (Adult)   Optimal Fluid Balance making progress toward outcome          I have personally evaluated and examined the patient. The Attending was available to me as a supervising provider if needed.

## 2019-10-17 NOTE — PLAN OF CARE
Problem: Patient Care Overview  Goal: Plan of Care Review  Outcome: Ongoing (interventions implemented as appropriate)   10/17/19 1100   Coping/Psychosocial   Plan of Care Reviewed With patient   Plan of Care Review   Progress improving   OTHER   Outcome Summary Pt does not seem to be in pain at this time. MACHO Brennan thoroughly educated family on the need for antibiotics and fevers once pt returns to nursing home. Possible dc today; will continue to monitor.     Goal: Individualization and Mutuality  Outcome: Ongoing (interventions implemented as appropriate)      Problem: Sepsis/Septic Shock (Adult)  Goal: Signs and Symptoms of Listed Potential Problems Will be Absent, Minimized or Managed (Sepsis/Septic Shock)  Outcome: Ongoing (interventions implemented as appropriate)      Problem: Fall Risk (Adult)  Goal: Absence of Fall  Outcome: Ongoing (interventions implemented as appropriate)      Problem: Skin Injury Risk (Adult)  Goal: Skin Health and Integrity  Outcome: Ongoing (interventions implemented as appropriate)      Problem: Hearing Impairment/Deaf (Adult,Obstetrics,Pediatric)  Goal: Enhanced Communication  Outcome: Ongoing (interventions implemented as appropriate)      Problem: Renal Failure/Kidney Injury, Acute (Adult)  Goal: Signs and Symptoms of Listed Potential Problems Will be Absent, Minimized or Managed (Renal Failure/Kidney Injury, Acute)  Outcome: Ongoing (interventions implemented as appropriate)      Problem: Anxiety (Adult)  Goal: Reduction/Resolution  Outcome: Ongoing (interventions implemented as appropriate)      Problem: Pneumonia (Adult)  Goal: Signs and Symptoms of Listed Potential Problems Will be Absent, Minimized or Managed (Pneumonia)  Outcome: Ongoing (interventions implemented as appropriate)      Problem: Activity Intolerance (Adult)  Goal: Activity Tolerance  Outcome: Ongoing (interventions implemented as appropriate)    Goal: Effective Energy Conservation Techniques  Outcome:  Ongoing (interventions implemented as appropriate)      Problem: Fluid Volume Excess (Adult)  Goal: Optimal Fluid Balance  Outcome: Ongoing (interventions implemented as appropriate)

## 2019-10-17 NOTE — DISCHARGE SUMMARY
[facility] Medicine Services  DISCHARGE SUMMARY       Date of Admission: 10/10/2019  Date of Discharge:  10/17/2019  Primary Care Physician: Austyn Pelayo MD    Presenting Problem/History of Present Illness:  Acute pyelonephritis [N10]  Acute pyelonephritis [N10]     Final Discharge Diagnoses:    Sepsis due to urinary tract infection (CMS/HCC)    Acute pyelonephritis    CAP (community acquired pneumonia)    DMII (diabetes mellitus, type 2) (CMS/[facility])    Essential hypertension      Consults:   Consults     No orders found from 9/11/2019 to 10/11/2019.          Pertinent Test Results:   Xr Abdomen 2 View With Chest 1 View    Result Date: 10/13/2019  PROCEDURE: XR ABDOMEN 2 VW W CHEST 1 VW INDICATION:  Abdominal pain, pyelonephritis COMPARISON VIEWS:  Chest x-ray dated 10/13/2019 FINDINGS: An acute abdominal series was performed by obtaining a frontal chest radiograph and two abdominal radiographs.  CHEST: Heart size: Mild stable cardiac megaly Mediastinal contour: Within normal limits Lungs: Central vascular and interstitial prominence and indistinctness. Pleura: Minimal blunting of bilateral costophrenic angles, and tiny effusions are not excluded Osseous: Limited assessment of the osseous structures is unremarkable for age. ABDOMEN: Bowel gas pattern: Nonobstructive. There is gas and stool is for distally as the rectum No gross evidence of organomegaly. There is no evidence of free intraperitoneal air. Osseous:  Limited assessment of the osseous structures is unremarkable for age. Irregular calcific density in the pelvis may represent a calcified fibroid.     1. Cardiomegaly and suspected pulmonary edema as above 2. No acute abnormality of the abdomen or pelvis identified. Electronically signed by:  Krys Barragan MD  10/13/2019 3:30 PM CDT Workstation: [id number]    Nm Lung Scan Perfusion Particulate    Result Date: 10/13/2019  PROCEDURE: NM LUNG SCAN PERFUSION PARTICULATE  INDICATION:   Acute respiratory distress syndrome COMPARISON: 10/13/2019 TECHNIQUE:   Perfusion:   - 6.1 mCi of technetium labeled MAA FINDINGS: Perfusion:   - no evidence of pleural based wedge-shaped perfusion defect(s), corresponding to segmental / lobar anatomy, to suggest the presence of  acute pulmonary embolism.     No findings consistent with pulmonary embolism. Electronically signed by:  Krys Barragan MD  10/13/2019 4:45 PM CDT Workstation: [id number]    Ct Abdomen Pelvis With Contrast    Result Date: 10/10/2019  CT abdomen, pelvis with contrast. CLINICAL INDICATION: Abdominal pain, vomiting.   COMPARISON: CT July 10, 2015.   TECHNIQUE: 80 mL of Visipaque 320  nonionic IV contrast. Helical scanning with axial and coronal reformations Soft tissue, lung, and bone windows reviewed. This exam was performed according to our departmental dose-optimization program, which includes automated exposure control, adjustment of the mA and/or kV according to patient size and/or use of iterative reconstruction technique. ABDOMEN CT FINDINGS: The visualized lung bases show minor dependent changes. Liver, spleen, pancreas, adrenal glands and right kidney are are normal in appearance. There are perinephric inflammatory changes (stranding) surrounding the left kidney without evidence of hydronephrosis. The above findings may indicate inflammation, infection, early changes of pyelonephritis. Left kidney is otherwise unremarkable. There are several small retroperitoneal lymph nodes adjacent to the left periaortic region similar to prior exam. Bowel grossly unremarkable. There is a tiny calcification within the gallbladder wall possibly cholesterol polyp. This however is unchanged since prior CT examination July 10, 2015 and therefore is of incidental significance. No evidence of pathologically enlarged nodes, free air, or free fluid. Degenerative changes of the lumbar spine. The bones are otherwise unremarkable. PELVIS CT FINDINGS:  There is a 2.85 cm calcified subserosal fibroid arising from the right side of the uterus. The pelvis otherwise unremarkable.. No evidence of pathologically enlarged nodes, free air, or free fluid. The bones are grossly unremarkable.     CONCLUSION: There are perinephric left-sided inflammatory changes surrounding the left kidney more pronounced than on prior examination. This may suggest inflammation, infection i.e. early changes of pyelonephritis . No hydronephrotic changes. 2.5 cm calcified subserosal fibroid arising from the uterine fundus. Pelvis otherwise unremarkable. CT abdomen, pelvis with contrast is otherwise unremarkable.  Electronically signed by:  Carlos Greenwood MD  10/10/2019 4:44 PM CDT Workstation: [id number]    Xr Chest Pa & Lateral    Result Date: 10/13/2019  Chest 2 view on  10/13/2019 CLINICAL INDICATION: Pneumonia COMPARISON: 10/10/2019 FINDINGS: Cardiomegaly is noted. There are trace bilateral pleural effusions. Mild increased interstitial changes may be chronic in nature versus edema. Vascular calcification is noted in the aorta.     Findings suggesting mild changes of CHF. Electronically signed by:  Wenceslao Toussaint  10/13/2019 5:59 AM CDT Workstation: [id number]    Xr Chest Pa & Lateral    Result Date: 10/10/2019  Chest 2 view on  10/10/2019 CLINICAL INDICATION: Cough, congestion COMPARISON: None FINDINGS: Heart is borderline in size. Mild increased interstitial changes may be chronic in nature but cannot exclude mild edema or atypical pneumonia. Hilar and mediastinal contours are within normal limits. No bony abnormality is noted.     Mild increased interstitial changes may be chronic in nature versus mild edema or atypical pneumonia. Correlation with an old exam or short-term follow-up will be useful. Electronically signed by:  Wenceslao Toussaint  10/10/2019 9:14 PM CDT Workstation: [id number]      HPI/Hospital Course:  The patient is a 74 y.o. female with a history of CAD, HTN, and DMII who  "presented to [facility] with fevers, chills, and fatigue.  UA was consistent with UTI.  CT of the abdomen and pelvis reveals  perinephric left-sided inflammatory changes around the left kidney consistent with pyelonephritis.  She was admitted on broad spectrum IV antibiotics.  Chest x-ray on 10/10 demonstrated mild increased interstitial changes consistent with mild edema vs atypical pneumonia.  She received 6 days of cefepime and vancomycin.  Urine culture grew E. Coli.  She will complete her antibiotic course with PO cefpodoxime.  She is afebrile, feeling well, and wishes to return to nursing home.  She is stable and discharged to home.    Condition on Discharge:  Stable    Physical Exam on Discharge:  /59 (BP Location: Left arm, Patient Position: Sitting)   Pulse 56   Temp 96.6 °F (35.9 °C) (Oral)   Resp 18   Ht 154.9 cm (61\")   Wt 66.6 kg (146 lb 12.8 oz)   SpO2 96%   BMI 27.74 kg/m²   Physical Exam   Constitutional: She appears well-developed and well-nourished.   deaf   HENT:   Head: Normocephalic.   Eyes: Conjunctivae are normal.   Cardiovascular: Normal rate and regular rhythm.   Pulmonary/Chest: Effort normal and breath sounds normal. No respiratory distress.   Abdominal: Soft. Bowel sounds are normal. She exhibits no distension. There is no tenderness.   Musculoskeletal: She exhibits no edema or deformity.   Neurological: She is alert. She exhibits normal muscle tone.   Skin: Skin is warm and dry. She is not diaphoretic.   Vitals reviewed.        Discharge Disposition:  Skilled Nursing Facility (DC - External)    Discharge Medications:     Discharge Medications      New Medications      Instructions Start Date   cefpodoxime 200 MG tablet  Commonly known as:  VANTIN  Notes to patient:  10/17/2019 at 9PM   200 mg, Oral, 2 Times Daily      HYDROcodone-acetaminophen 5-325 MG per tablet  Commonly known as:  NORCO  Notes to patient:  10/17/2019 at 9PM   1 tablet, Oral, Nightly       "   Changes to Medications      Instructions Start Date   clonazePAM 0.5 MG tablet  Commonly known as:  KlonoPIN  What changed:    · when to take this  · reasons to take this  Notes to patient:  10/17/2019 at 9PM   0.5 mg, Oral, 2 Times Daily         Continue These Medications      Instructions Start Date   acetaminophen 500 MG tablet  Commonly known as:  TYLENOL  Notes to patient:  As needed.   500 mg, Oral, Every 6 Hours PRN      atorvastatin 10 MG tablet  Commonly known as:  LIPITOR  Notes to patient:  10/17/2019 at 9PM   10 mg, Oral, Nightly      clopidogrel 75 MG tablet  Commonly known as:  PLAVIX  Notes to patient:  10/18/2019 at 9AM   75 mg, Oral, Daily      glucagon 1 MG injection  Commonly known as:  GLUCAGEN   Intravenous, Once      Glucose 15 GM/32ML gel  Notes to patient:  As needed.   Oral, 2 Times Daily PRN      hydroCHLOROthiazide 25 MG tablet  Commonly known as:  HYDRODIURIL  Notes to patient:  10/18/2019 at 9AM   25 mg, Oral, Daily      Insulin Glargine 100 UNIT/ML injection pen  Commonly known as:  BASAGLAR KWIKPEN  Notes to patient:  10/18/2019 at 9AM   50 Units, Subcutaneous, Daily      metFORMIN 1000 MG tablet  Commonly known as:  GLUCOPHAGE  Notes to patient:  10/18/2019 at 9AM   1,000 mg, Oral      metoprolol tartrate 50 MG tablet  Commonly known as:  LOPRESSOR  Notes to patient:  10/18/2019 at 9AM   50 mg, Oral, Daily      pantoprazole 40 MG EC tablet  Commonly known as:  PROTONIX  Notes to patient:  10/18/2019 at 9AM   40 mg, Oral, Daily      risperiDONE 0.25 MG tablet  Commonly known as:  risperDAL  Notes to patient:  10/18/2019 at 9AM   0.25 mg, Oral, Daily      sertraline 25 MG tablet  Commonly known as:  ZOLOFT  Notes to patient:  10/18/2019 at 9AM   25 mg, Oral, Daily      simethicone 80 MG chewable tablet  Commonly known as:  MYLICON  Notes to patient:  As needed.   80 mg, Oral, Every 6 Hours PRN         Stop These Medications    meloxicam 7.5 MG tablet  Commonly known as:  MOBIC             Discharge Diet:   Diet Instructions     Diet: Consistent Carbohydrate, Regular; Thin      Discharge Diet:   Consistent Carbohydrate  Regular       Fluid Consistency:  Thin          Activity at Discharge:   Activity Instructions     Activity as Tolerated            Follow-up Appointments:   1. PCP 1 week    Test Results Pending at Discharge:    Order Current Status    Blood Culture - Blood, Hand, Left Preliminary result    Blood Culture - Blood, Hand, Left Preliminary result        MACHO Stiles  10/17/19  3:56 PM    Time: Discharge planning and teaching took greater than 30 minutes.

## 2019-10-17 NOTE — PLAN OF CARE
Problem: Patient Care Overview  Goal: Plan of Care Review  Outcome: Ongoing (interventions implemented as appropriate)   10/17/19 1044 10/17/19 1100   Coping/Psychosocial   Plan of Care Reviewed With --  patient   Plan of Care Review   Progress --  improving   OTHER   Outcome Summary pt sit<>stand with SBA, pt ambulated 250` x 2 without AD with SBA. pt will require 24/7 care @ D/C --

## 2019-10-17 NOTE — PROGRESS NOTES
"Pharmacokinetics by Pharmacy - Vancomycin    Melissa Parham is a 74 y.o. female receiving vancomycin 1000mg IV Q24H day 3 for pneumonia  Patient is also receiving cefepime    Objective:  [Ht: 154.9 cm (61\"); Wt: 66.6 kg (146 lb 12.8 oz)]     Lab Results   Component Value Date    WBC 6.87 10/17/2019    WBC 8.58 10/16/2019    WBC 7.02 10/15/2019      Lab Results   Component Value Date    LACTATE 1.5 10/10/2019      Temp Readings from Last 1 Encounters:   10/17/19 97.5 °F (36.4 °C) (Oral)     Estimated Creatinine Clearance: 45.4 mL/min (by C-G formula based on SCr of 0.95 mg/dL).   Lab Results   Component Value Date    CREATININE 0.95 10/17/2019    CREATININE 1.08 (H) 10/16/2019    CREATININE 1.11 (H) 10/15/2019       Lab Results   Component Value Date    VANCOPEAK 31.40 10/16/2019    VANCORANDOM 9.50 10/13/2019       Culture Results:  Microbiology Results (last 10 days)       Procedure Component Value - Date/Time    MRSA Screen, PCR - Swab, Nares [[id number]]  (Normal) Collected:  10/13/19 0116    Lab Status:  Final result Specimen:  Swab from Nares Updated:  10/13/19 0507     MRSA, PCR Negative    Narrative:       Performed by real-time polymerase chain reaction (qPCR).    Blood Culture - Blood, Hand, Left [[id number]] Collected:  10/12/19 1922    Lab Status:  Preliminary result Specimen:  Blood from Hand, Left Updated:  10/16/19 1934     Blood Culture No growth at 4 days    Blood Culture - Blood, Hand, Left [[id number]] Collected:  10/12/19 1922    Lab Status:  Preliminary result Specimen:  Blood from Hand, Left Updated:  10/16/19 1934     Blood Culture No growth at 4 days    Respiratory Panel, PCR - Swab, Nasopharynx [[id number]]  (Normal) Collected:  10/11/19 1816    Lab Status:  Final result Specimen:  Swab from Nasopharynx Updated:  10/11/19 2008     ADENOVIRUS, PCR Not Detected     Coronavirus 229E Not Detected     Coronavirus HKU1 Not Detected     Coronavirus NL63 Not Detected     Coronavirus OC43 Not " Detected     Human Metapneumovirus Not Detected     Human Rhinovirus/Enterovirus Not Detected     Influenza B PCR Not Detected     Parainfluenza Virus 1 Not Detected     Parainfluenza Virus 2 Not Detected     Parainfluenza Virus 3 Not Detected     Parainfluenza Virus 4 Not Detected     Bordetella pertussis pcr Not Detected     Influenza A H1 2009 PCR Not Detected     Chlamydophila pneumoniae PCR Not Detected     Mycoplasma pneumo by PCR Not Detected     Influenza A PCR Not Detected     Influenza A H3 Not Detected     Influenza A H1 Not Detected     RSV, PCR Not Detected    Legionella Antigen, Urine - Urine, Urine, Clean Catch [[id number]]  (Normal) Collected:  10/11/19 1632    Lab Status:  Final result Specimen:  Urine, Clean Catch Updated:  10/11/19 1709     LEGIONELLA ANTIGEN, URINE Negative    S. Pneumo Ag Urine or CSF - Urine, Urine, Clean Catch [[id number]]  (Normal) Collected:  10/11/19 1632    Lab Status:  Final result Specimen:  Urine, Clean Catch Updated:  10/11/19 1709     Strep Pneumo Ag Negative    Influenza Antigen, Rapid - Swab, Nasopharynx [[id number]]  (Normal) Collected:  10/10/19 1546    Lab Status:  Final result Specimen:  Swab from Nasopharynx Updated:  10/10/19 1622     Influenza A Ag, EIA Negative     Influenza B Ag, EIA Negative    Urine Culture - Urine, Urine, Clean Catch [[id number]]  (Abnormal)  (Susceptibility) Collected:  10/10/19 1533    Lab Status:  Final result Specimen:  Urine, Clean Catch Updated:  10/12/19 0347     Urine Culture >100,000 CFU/mL Escherichia coli    Susceptibility        Escherichia coli     TABATHA     Ampicillin Resistant     Ampicillin + Sulbactam Intermediate     Cefazolin Susceptible     Cefepime Susceptible     Ceftazidime Susceptible     Ceftriaxone Susceptible     Gentamicin Susceptible     Levofloxacin Susceptible     Nitrofurantoin Susceptible     Piperacillin + Tazobactam Susceptible     Tetracycline Resistant     Trimethoprim + Sulfamethoxazole Resistant                                    Assessment:  WBC WNL  SCr WNL, continues to trend down   Afebrile    10/10 urine - E. Coli  10/10 negative influenza, strep pneumo, legionella  10/11 negative respiratory panel  10/12 blood - NG4d  10/13 negative MRSA PCR     Re-initiated vancomycin for persistent fevers.  Vancomycin may be stopped today, afebrile post vanc, blood cultures were taken post antibiotics, will leave trough for today in case continued for proper AUC dosing.     Utilizing AUC dosing  Goal AUC for pneumonia: 400-600     Plan:  1. Vancomycin 1000 mg IV q24h  2. Peak 10/16 21032, Trough 10/17 1200  3. Pharmacy will monitor renal function and adjust dose accordingly.      Wenceslao Squires [facility]   10/17/19 9:56 AM

## 2019-10-17 NOTE — THERAPY TREATMENT NOTE
Acute Care - Occupational Therapy Treatment Note  [facility]     Patient Name: Melissa Parham  : 1945  MRN: [id number]  Today's Date: 10/17/2019  Onset of Illness/Injury or Date of Surgery: 10/10/19  Date of Referral to OT: 10/10/19  Referring Physician: Renetta PRITCHARD    Admit Date: 10/10/2019       ICD-10-CM ICD-9-CM   1. Acute pyelonephritis N10 590.10   2. Impaired functional mobility and endurance Z74.09 V49.89   3. Impaired mobility and ADLs Z74.09 799.89     Patient Active Problem List   Diagnosis   • Acute pyelonephritis     Past Medical History:   Diagnosis Date   • Arthritis    • CHF (congestive heart failure) (CMS/HCC)    • Deaf    • Diabetes mellitus (CMS/HCC)    • Elevated cholesterol    • Hypertension    • Nonverbal      History reviewed. No pertinent surgical history.    Therapy Treatment    Rehabilitation Treatment Summary     Row Name 10/17/19 0715             Treatment Time/Intention    Discipline  occupational therapy assistant  -LW      Document Type  therapy note (daily note)  -LW      Subjective Information  no complaints  -LW      Mode of Treatment  occupational therapy  -LW      Patient/Family Observations  Pt side lying in bed. Sister present.   -LW      Care Plan Review  care plan/treatment goals reviewed  -LW      Total Minutes, Occupational Therapy Treatment  60  -LW      Therapy Frequency (OT Eval)  other (see comments) 5-7 days per week  -LW      Patient Effort  adequate  -LW      Existing Precautions/Restrictions  other (see comments) deaf, non verbal  -LW      Equipment Issued to Patient  gait belt  -LW      Recorded by [LW] Madeline Young COTA/L 10/17/19 1024      Row Name 10/17/19 0715             Vital Signs    Pre Systolic BP Rehab  133  -LW      Pre Treatment Diastolic BP  72  -LW      Pretreatment Heart Rate (beats/min)  63  -LW      Posttreatment Heart Rate (beats/min)  65  -LW      Pre SpO2 (%)  97  -LW      O2 Delivery Pre Treatment  room air  -LW       Post SpO2 (%)  95  -LW      O2 Delivery Post Treatment  room air  -LW      Pre Patient Position  Supine  -LW      Post Patient Position  Sitting  -LW      Recorded by [LW] Madeline Young COTA/L 10/17/19 1024      Row Name 10/17/19 0715             Cognitive Assessment/Intervention- PT/OT    Affect/Mental Status (Cognitive)  WFL  -LW      Orientation Status (Cognition)  unable/difficult to assess  -LW      Follows Commands (Cognition)  follows one step commands;75-90% accuracy  -LW      Safety Deficit (Cognitive)  moderate deficit  -LW      Personal Safety Interventions  fall prevention program maintained;gait belt;nonskid shoes/slippers when out of bed  -LW      Recorded by [LW] Madeline Young COTA/L 10/17/19 1024      Row Name 10/17/19 0715             Safety Issues, Functional Mobility    Safety Issues Affecting Function (Mobility)  ability to follow commands  -LW      Impairments Affecting Function (Mobility)  endurance/activity tolerance  -LW      Recorded by [LW] Madeline Young COTA/L 10/17/19 1024      Row Name 10/17/19 0715             Bed Mobility Assessment/Treatment    Bed Mobility Assessment/Treatment  supine-sit  -LW      Sit-Supine [place] (Bed Mobility)  contact guard  -LW      Assistive Device (Bed Mobility)  bed rails  -LW      Recorded by [LW] Madeline Young COTA/L 10/17/19 1024      Row Name 10/17/19 0715             Functional Mobility    Functional Mobility- Ind. Level  contact guard assist  -LW      Functional Mobility- Device  other (see comments) No AD HHA  -LW      Recorded by [LW] Madeline Young COTA/L 10/17/19 1024      Row Name 10/17/19 0715             Sit-Stand Transfer    Sit-Stand [place] (Transfers)  supervision  -LW      Assistive Device (Sit-Stand Transfers)  other (see comments) no AD  -LW      Recorded by [LW] Madeline Young COTA/L 10/17/19 1024      Row Name 10/17/19 0715             Stand-Sit Transfer    Stand-Sit [place] (Transfers)   supervision  -LW      Assistive Device (Stand-Sit Transfers)  other (see comments) no AD  -LW      Recorded by [LW] Madeline Young COTA/L 10/17/19 [facility]      Row Name 10/17/19 0715             Toilet Transfer    Type (Toilet Transfer)  sit-stand;stand-sit  -LW      [place] Level (Toilet Transfer)  supervision  -LW      Assistive Device (Toilet Transfer)  commode;grab bars/safety frame  -LW      Recorded by [LW] Madeline Young COTA/L 10/17/19 [facility]      Row Name 10/17/19 0715             ADL Assessment/Intervention    BADL Assessment/Intervention  lower body dressing;grooming;feeding;toileting  -LW      Recorded by [LW] Madeline Young COTA/L 10/17/19 [facility]      Row Name 10/17/19 0715             Lower Body Dressing Assessment/Training    Lower Body Dressing [place] Level  lower body dressing skills;don;socks;independent  -LW      Lower Body Dressing Position  edge of bed sitting  -LW      Recorded by [LW] Madeline Young COTA/L 10/17/19 [facility]      Row Name 10/17/19 0715             Grooming Assessment/Training    [place] Level (Grooming)  grooming skills;hair care, combing/brushing;wash face, hands  -LW      Grooming Position  supported sitting  -LW      Recorded by [LW] Madeline Young COTA/L 10/17/19 [facility]      Row Name 10/17/19 0715             Self-Feeding Assessment/Training    [place] Level (Feeding)  feeding skills;liquids to mouth;prepare tray/open items;scoop food and bring to mouth;conditional independence;set up  -LW      Self-Feeding Assess/Train, Spillage Amount  minimal  -LW      Position (Self-Feeding)  supported sitting  -LW      Comment (Feeding)  Pt needing assistance to set up tray and open items.   -LW      Recorded by [LW] Madeline Young COTA/L 10/17/19 1024      Row Name 10/17/19 0715             Toileting Assessment/Training    [place] Level (Toileting)  toileting skills;adjust/manage clothing;perform perineal hygiene;contact guard assist  -LW      Assistive  Devices (Toileting)  commode;grab bar/safety frame  -LW      Toileting Position  supported sitting  -LW      Comment (Toileting)  Pt needing assistance with pericare.   -LW      Recorded by [LW] Madeline Young COTA/L 10/17/19 1024      Row Name 10/17/19 0715             BADL Safety/Performance    Impairments, BADL Safety/Performance  endurance/activity tolerance  -LW      Recorded by [LW] Madeline Young COTA/L 10/17/19 1024      Row Name 10/17/19 0715             Positioning and Restraints    Pre-Treatment Position  in bed  -LW      Post Treatment Position  chair  -LW      In Chair  reclined;call light within reach;encouraged to call for assist;exit alarm on;with family/caregiver  -LW      Recorded by [LW] Madeline Young COTA/L 10/17/19 1024      Row Name 10/17/19 0715             Pain Scale: Numbers Pre/Post-Treatment    Pain Scale: Numbers, Pretreatment  0/10 - no pain  -LW      Pain Scale: Numbers, Post-Treatment  0/10 - no pain  -LW      Recorded by [LW] Madeline Young COTA/L 10/17/19 1024      Row Name 10/17/19 0715             Coping    Observed Emotional State  cooperative  -LW      Verbalized Emotional State  acceptance  -LW      Recorded by [LW] Madeline Young COTA/L 10/17/19 1024      Row Name 10/17/19 0715             Plan of Care Review    Plan of Care Reviewed With  patient  -LW      Recorded by [LW] Madeline Young COTA/L 10/17/19 1024      Row Name 10/17/19 0715             Outcome Summary/Treatment Plan (OT)    Daily Summary of Progress (OT)  progress toward functional goals is good  -LW      Plan for Continued Treatment (OT)  Continue POC  -LW      Anticipated Discharge Disposition (OT)  anticipate therapy at next level of care  -LW      Recorded by [LW] Madeline Young COTA/L 10/17/19 1024        User Key  (r) = Recorded By, (t) = Taken By, (c) = Cosigned By    Initials Name Effective Dates Discipline    LW Madeline Young COTA/L 03/07/18 -  OT           Rehab Goal Summary      Row Name 10/17/19 0715             Occupational Therapy Goals    Transfer Goal Selection (OT)  transfer, OT goal 1  -LW      Bathing Goal Selection (OT)  bathing, OT goal 1  -LW      Dressing Goal Selection (OT)  dressing, OT goal 1  -LW      Toileting Goal Selection (OT)  toileting, OT goal 1  -LW      Grooming Goal Selection (OT)  grooming, OT goal 1  -LW      Self-Feeding Goal Selection (OT)  self feeding, OT goal 1  -LW      Functional Mobility Goal Selection (OT)  functional mobility, OT goal 1  -LW         Transfer Goal 1 (OT)    Activity/Assistive Device (Transfer Goal 1, OT)  toilet  -LW      [place] Level/Cues Needed (Transfer Goal 1, OT)  standby assist  -LW      Time Frame (Transfer Goal 1, OT)  long term goal (LTG);by discharge  -LW      Progress/Outcome (Transfer Goal 1, OT)  goal met  (Significant)   -LW         Bathing Goal 1 (OT)    Activity/Assistive Device (Bathing Goal 1, OT)  bathing skills, all  -LW      [place] Level/Cues Needed (Bathing Goal 1, OT)  set-up required;standby assist;tactile cues required  -LW      Time Frame (Bathing Goal 1, OT)  long term goal (LTG);by discharge  -LW      Progress/Outcomes (Bathing Goal 1, OT)  goal met  (Significant)   -LW         Dressing Goal 1 (OT)    Activity/Assistive Device (Dressing Goal 1, OT)  dressing skills, all  -LW      [place]/Cues Needed (Dressing Goal 1, OT)  set-up required;tactile cues required;standby assist  -LW      Time Frame (Dressing Goal 1, OT)  long term goal (LTG);by discharge  -LW      Progress/Outcome (Dressing Goal 1, OT)  goal met  (Significant)   -LW         Toileting Goal 1 (OT)    Activity/Device (Toileting Goal 1, OT)  toileting skills, all  -LW      [place] Level/Cues Needed (Toileting Goal 1, OT)  set-up required;tactile cues required;standby assist  -LW      Time Frame (Toileting Goal 1, OT)  long term goal (LTG);by discharge  -LW      Progress/Outcome (Toileting Goal 1, OT)  goal met  (Significant)    -LW         Grooming Goal 1 (OT)    Activity/Device (Grooming Goal 1, OT)  grooming skills, all  -LW      [place] (Grooming Goal 1, OT)  set-up required;tactile cues required;standby assist  -LW      Time Frame (Grooming Goal 1, OT)  long term goal (LTG);by discharge  -LW      Progress/Outcome (Grooming Goal 1, OT)  goal met  (Significant)   -LW         Self-Feeding Goal 1 (OT)    Activity/Assistive Device (Self-Feeding Goal 1, OT)  self-feeding skills, all  -LW      [place] Level/Cues Needed (Self-Feeding Goal 1, OT)  set-up required;tactile cues required;standby assist  -LW      Time Frame (Self-Feeding Goal 1, OT)  long term goal (LTG);by discharge  -LW      Progress/Outcomes (Self-Feeding Goal 1, OT)  goal met  (Significant)   -LW         Functional Mobility Goal 1 (OT)    Activity/Assistive Device (Functional Mobility Goal 1, OT)  -- as needed  -LW      [place] Level/Cues Needed (Functional Mobility Goal 1, OT)  standby assist  -LW      Distance Goal 1 (Functional Mobility, OT)  for ADL tasks with no LOB and fair safety   -LW      Time Frame (Functional Mobility Goal 1, OT)  long term goal (LTG);by discharge  -LW      Progress/Outcome (Functional Mobility Goal 1, OT)  goal not met  -LW        User Key  (r) = Recorded By, (t) = Taken By, (c) = Cosigned By    Initials Name Provider Type Discipline    Madeline Long COTA/L Occupational Therapy Assistant OT        Occupational Therapy Education     Title: PT OT SLP Therapies (Done)     Topic: Occupational Therapy (Done)     Point: ADL training (Done)     Description: Instruct learner(s) on proper safety adaptation and remediation techniques during self care or transfers.   Instruct in proper use of assistive devices.    Learning Progress Summary           Patient Acceptance, E,TB, VU by SYLVESTER at 10/17/2019 10:26 AM    Acceptance, E,TB, VU by SYLVESTER at 10/16/2019 11:56 AM    Acceptance, E,TB, VU by SYLVESTER at 10/15/2019  4:03 PM    Acceptance, E, VU,NR by   at 10/14/2019  3:12 PM    Comment:  Educated about OT and POC. Educated to call for assist. Educated on safety throughout.   Family Acceptance, E, VU,NR by  at 10/14/2019  3:12 PM    Comment:  Educated about OT and POC. Educated to call for assist. Educated on safety throughout.                   Point: Home exercise program (Done)     Description: Instruct learner(s) on appropriate technique for monitoring, assisting and/or progressing therapeutic exercises/activities.    Learning Progress Summary           Patient Acceptance, E,TB, VU by  at 10/17/2019 10:26 AM    Acceptance, E,TB, VU by  at 10/16/2019 11:56 AM    Acceptance, E,TB, VU by  at 10/15/2019  4:03 PM                   Point: Precautions (Done)     Description: Instruct learner(s) on prescribed precautions during self-care and functional transfers.    Learning Progress Summary           Patient Acceptance, E,TB, VU by  at 10/17/2019 10:26 AM    Acceptance, E,TB, VU by  at 10/16/2019 11:56 AM    Acceptance, E,TB, VU by  at 10/15/2019  4:03 PM    Acceptance, E, VU,NR by  at 10/14/2019  3:12 PM    Comment:  Educated about OT and POC. Educated to call for assist. Educated on safety throughout.   Family Acceptance, E, VU,NR by  at 10/14/2019  3:12 PM    Comment:  Educated about OT and POC. Educated to call for assist. Educated on safety throughout.                   Point: Body mechanics (Done)     Description: Instruct learner(s) on proper positioning and spine alignment during self-care, functional mobility activities and/or exercises.    Learning Progress Summary           Patient Acceptance, E,TB, VU by  at 10/17/2019 10:26 AM    Acceptance, E,TB, VU by  at 10/16/2019 11:56 AM    Acceptance, E,TB, VU by  at 10/15/2019  4:03 PM                               User Key     Initials Effective Dates Name Provider Type Discipline     06/08/18 -  Cielo Stewart OTR/L Occupational Therapist OT     03/07/18 -  Madeline Young  STEPHENS/L Occupational Therapy Assistant OT                OT Recommendation and Plan  Outcome Summary/Treatment Plan (OT)  Daily Summary of Progress (OT): progress toward functional goals is good  Plan for Continued Treatment (OT): Continue POC  Anticipated Discharge Disposition (OT): anticipate therapy at next level of care  Therapy Frequency (OT Eval): other (see comments)(5-7 days per week)  Daily Summary of Progress (OT): progress toward functional goals is good  Plan of Care Review  Plan of Care Reviewed With: patient  Plan of Care Reviewed With: patient  Outcome Summary: Pt sup to sit with cond I. Pt t/f to toilet with Supervision. Toileting tasks CGA. Pt feeding goals met.   Outcome Measures     Row Name 10/17/19 0715 10/16/19 1300 10/16/19 0705       How much help from another person do you currently need...    Turning from your back to your side while in flat bed without using bedrails?  --  3  -TA  --    Moving from lying on back to sitting on the side of a flat bed without bedrails?  --  3  -TA  --    Moving to and from a bed to a chair (including a wheelchair)?  --  3  -TA  --    Standing up from a chair using your arms (e.g., wheelchair, bedside chair)?  --  3  -TA  --    Climbing 3-5 steps with a railing?  --  3  -TA  --    To walk in hospital room?  --  3  -TA  --    AM-PAC 6 Clicks Score (PT)  --  18  -TA  --       How much help from another is currently needed...    Putting on and taking off regular lower body clothing?  3  -LW  --  3  -LW    Bathing (including washing, rinsing, and drying)  3  -LW  --  3  -LW    Toileting (which includes using toilet bed pan or urinal)  3  -LW  --  3  -LW    Putting on and taking off regular upper body clothing  3  -LW  --  3  -LW    Taking care of personal grooming (such as brushing teeth)  3  -LW  --  3  -LW    Eating meals  3  -LW  --  3  -LW    AM-PAC 6 Clicks Score (OT)  18  -LW  --  18  -LW       Functional Assessment    Outcome Measure Options  --  AM-PAC 6  Clicks Basic Mobility (PT)  -TA  --    Row Name 10/15/19 1700 10/15/19 1240 10/14/19 1323       How much help from another person do you currently need...    Turning from your back to your side while in flat bed without using bedrails?  3  -TA  --  --    Moving from lying on back to sitting on the side of a flat bed without bedrails?  3  -TA  --  --    Moving to and from a bed to a chair (including a wheelchair)?  3  -TA  --  --    Standing up from a chair using your arms (e.g., wheelchair, bedside chair)?  3  -TA  --  --    Climbing 3-5 steps with a railing?  3  -TA  --  --    To walk in hospital room?  3  -TA  --  --    AM-PAC 6 Clicks Score (PT)  18  -TA  --  --       How much help from another is currently needed...    Putting on and taking off regular lower body clothing?  --  3  -LW  3  -BH    Bathing (including washing, rinsing, and drying)  --  3  -LW  3  -BH    Toileting (which includes using toilet bed pan or urinal)  --  3  -LW  3  -BH    Putting on and taking off regular upper body clothing  --  3  -LW  3  -BH    Taking care of personal grooming (such as brushing teeth)  --  3  -LW  3  -BH    Eating meals  --  3  -LW  3  -BH    AM-PAC 6 Clicks Score (OT)  --  18  -LW  18  -BH       Functional Assessment    Outcome Measure Options  AM-PAC 6 Clicks Basic Mobility (PT)  -TA  --  AM-PAC 6 Clicks Daily Activity (OT)  -    Row Name 10/14/19 1300             How much help from another person do you currently need...    Turning from your back to your side while in flat bed without using bedrails?  3  -TA      Moving from lying on back to sitting on the side of a flat bed without bedrails?  3  -TA      Moving to and from a bed to a chair (including a wheelchair)?  3  -TA      Standing up from a chair using your arms (e.g., wheelchair, bedside chair)?  3  -TA      Climbing 3-5 steps with a railing?  3  -TA      To walk in hospital room?  3  -TA      AM-PAC 6 Clicks Score (PT)  18  -TA         Functional  Assessment    Outcome Measure Options  AM-PAC 6 Clicks Basic Mobility (PT)  -TA        User Key  (r) = Recorded By, (t) = Taken By, (c) = Cosigned By    Initials Name Provider Type     Cielo Stewart, OTR/L Occupational Therapist    Josefina Bahena, PTA Physical Therapy Assistant    Madeline Long STEPHENS/L Occupational Therapy Assistant           Time Calculation:   Time Calculation- OT     Row Name 10/17/19 1028             Time Calculation- OT    OT Start Time  0715  -LW      OT Stop Time  0815  -LW      OT Time Calculation (min)  60 min  -LW      Total Timed Code Minutes- OT  60 minute(s)  -LW      OT Received On  10/17/19  -        User Key  (r) = Recorded By, (t) = Taken By, (c) = Cosigned By    Initials Name Provider Type    Madeline Long, STEPHENS/L Occupational Therapy Assistant        Therapy Charges for Today     Code Description Service Date Service Provider Modifiers Qty    72737313918 HC OT SELF CARE/MGMT/TRAIN EA 15 MIN 10/16/2019 Madeline Young COTA/L GO 4    78903367185 HC OT SELF CARE/MGMT/TRAIN EA 15 MIN 10/17/2019 Madeline Young COTA/PAUL GO 4               BETHANY Cisneros  10/17/2019

## 2019-10-17 NOTE — PLAN OF CARE
Problem: Patient Care Overview  Goal: Plan of Care Review  Outcome: Ongoing (interventions implemented as appropriate)   10/17/19 1026   Coping/Psychosocial   Plan of Care Reviewed With patient   Plan of Care Review   Progress improving   OTHER   Outcome Summary Pt sup to sit with cond I. Pt t/f to toilet with Supervision. Toileting tasks CGA. Pt feeding goals met.

## 2019-10-17 NOTE — THERAPY TREATMENT NOTE
Acute Care - Physical Therapy Treatment Note  [facility] [place]     Patient Name: Melissa Parham  : 1945  MRN: [id number]  Today's Date: 10/17/2019  Onset of Illness/Injury or Date of Surgery: 10/10/19  Date of Referral to PT: 10/11/19  Referring Physician: Renetta PRITCHARD    Admit Date: 10/10/2019    Visit Dx:    ICD-10-CM ICD-9-CM   1. Acute pyelonephritis N10 590.10   2. Impaired functional mobility and endurance Z74.09 V49.89   3. Impaired mobility and ADLs Z74.09 799.89     Patient Active Problem List   Diagnosis   • Acute pyelonephritis       Therapy Treatment    Rehabilitation Treatment Summary     Row Name 10/17/19 1044 10/17/19 0715          Treatment Time/Intention    Discipline  physical therapy assistant  -TA  occupational therapy assistant  -LW     Document Type  therapy note (daily note)  -TA  therapy note (daily note)  -LW     Subjective Information  no complaints  -TA  no complaints  -LW     Mode of Treatment  individual therapy;physical therapy  -TA  occupational therapy  -LW     Patient/Family Observations  --  Pt side lying in bed. Sister present.   -LW     Care Plan Review  --  care plan/treatment goals reviewed  -LW     Therapy Frequency (PT Clinical Impression)  daily  -TA  --     Total Minutes, Occupational Therapy Treatment  --  60  -LW     Therapy Frequency (OT Eval)  --  other (see comments) 5-7 days per week  -LW     Patient Effort  fair  -TA  adequate  -LW     Existing Precautions/Restrictions  other (see comments) deaf, non verbal  -TA  other (see comments) deaf, non verbal  -LW     Equipment Issued to Patient  --  gait belt  -LW     Recorded by [TA] Josefina Alves, WILLIAM 10/17/19 1136 [LW] Madeline Young, KAT/L 10/17/19 1024     Row Name 10/17/19 1044 10/17/19 0715          Vital Signs    Pre Systolic BP Rehab  --  133  -LW     Pre Treatment Diastolic BP  --  72  -LW     Post Systolic BP Rehab  127  -TA  --     Post Treatment Diastolic BP  58  -TA  --     Pretreatment  Heart Rate (beats/min)  64  -TA  63  -LW     Intratreatment Heart Rate (beats/min)  77  -TA  --     Posttreatment Heart Rate (beats/min)  62  -TA  65  -LW     Pre SpO2 (%)  96  -TA  97  -LW     O2 Delivery Pre Treatment  room air  -TA  room air  -LW     Intra SpO2 (%)  93  -TA  --     O2 Delivery Intra Treatment  room air  -TA  --     Post SpO2 (%)  95  -TA  95  -LW     O2 Delivery Post Treatment  room air  -TA  room air  -LW     Pre Patient Position  Sitting  -TA  Supine  -LW     Post Patient Position  Sitting  -TA  Sitting  -LW     Recorded by [TA] Josefina Alves, PTA 10/17/19 1136 [LW] Madeline Young COTA/L 10/17/19 1024     Row Name 10/17/19 1044 10/17/19 0715          Cognitive Assessment/Intervention- PT/OT    Affect/Mental Status (Cognitive)  --  WFL  -LW     Orientation Status (Cognition)  unable/difficult to assess deaf, non-verbal  -TA  unable/difficult to assess  -LW     Follows Commands (Cognition)  follows one step commands;50-74% accuracy;physical/tactile prompts required;repetition of directions required  -TA  follows one step commands;75-90% accuracy  -LW     Safety Deficit (Cognitive)  --  moderate deficit  -LW     Personal Safety Interventions  fall prevention program maintained;gait belt;nonskid shoes/slippers when out of bed;supervised activity  -TA  fall prevention program maintained;gait belt;nonskid shoes/slippers when out of bed  -LW     Recorded by [TA] Josefina Alves, PTA 10/17/19 1136 [LW] Madeline Young COTA/L 10/17/19 1024     Row Name 10/17/19 0715             Safety Issues, Functional Mobility    Safety Issues Affecting Function (Mobility)  ability to follow commands  -LW      Impairments Affecting Function (Mobility)  endurance/activity tolerance  -LW      Recorded by [LW] Madeline Young COTA/L 10/17/19 1024      Row Name 10/17/19 1044 10/17/19 0715          Bed Mobility Assessment/Treatment    Bed Mobility Assessment/Treatment  supine-sit;sit-supine  -TA  supine-sit  -LW      Supine-Sit [place] (Bed Mobility)  not tested  -TA  --     Sit-Supine [place] (Bed Mobility)  not tested  -TA  contact guard  -LW     Assistive Device (Bed Mobility)  --  bed rails  -LW     Recorded by [TA] Josefina Alves, PTA 10/17/19 1136 [LW] Madeline Young STEPHENS/L 10/17/19 1024     Row Name 10/17/19 0715             Functional Mobility    Functional Mobility- Ind. Level  contact guard assist  -LW      Functional Mobility- Device  other (see comments) No AD HHA  -LW      Recorded by [LW] Madeline Young STEPHENS/L 10/17/19 1024      Row Name 10/17/19 1044             Transfer Assessment/Treatment    Transfer Assessment/Treatment  sit-stand transfer;stand-sit transfer  -TA      Recorded by [TA] Josefina Alves, PTA 10/17/19 1136      Row Name 10/17/19 1044 10/17/19 0715          Sit-Stand Transfer    Sit-Stand [place] (Transfers)  supervision  -TA  supervision  -LW     Assistive Device (Sit-Stand Transfers)  other (see comments) no AD  -TA  other (see comments) no AD  -LW     Recorded by [TA] Josefina Alves, PTA 10/17/19 1136 [LW] Madeline Young STEPHENS/L 10/17/19 1024     Row Name 10/17/19 1044 10/17/19 0715          Stand-Sit Transfer    Stand-Sit [place] (Transfers)  supervision  -TA  supervision  -LW     Assistive Device (Stand-Sit Transfers)  other (see comments) no AD  -TA  other (see comments) no AD  -LW     Recorded by [TA] Josefina Alves, PTA 10/17/19 1136 [LW] Madeline Young STEPHENS/L 10/17/19 1024     Row Name 10/17/19 1044 10/17/19 0715          Toilet Transfer    Type (Toilet Transfer)  sit-stand;stand-sit  -TA  sit-stand;stand-sit  -LW     [place] Level (Toilet Transfer)  supervision  -TA  supervision  -LW     Assistive Device (Toilet Transfer)  --  commode;grab bars/safety frame  -LW     Recorded by [TA] Josefina Alves, PTA 10/17/19 1136 [LW] Madeline Young STEPHENS/L 10/17/19 1024     Row Name 10/17/19 1044             Gait/Stairs Assessment/Training    [place]  Level (Gait)  supervision  -TA      Assistive Device (Gait)  other (see comments) no AD  -TA      Distance in Feet (Gait)  250` x 2  -TA      Pattern (Gait)  step-through  -TA      Recorded by [TA] Josefina Alves, PTA 10/17/19 1136      Row Name 10/17/19 0715             ADL Assessment/Intervention    BADL Assessment/Intervention  lower body dressing;grooming;feeding;toileting  -LW      Recorded by [LW] Madeline Young COTA/L 10/17/19 1024      Row Name 10/17/19 0715             Lower Body Dressing Assessment/Training    Lower Body Dressing [place] Level  lower body dressing skills;don;socks;independent  -LW      Lower Body Dressing Position  edge of bed sitting  -LW      Recorded by [LW] Madeline Young COTA/L 10/17/19 1024      Row Name 10/17/19 0715             Grooming Assessment/Training    [place] Level (Grooming)  grooming skills;hair care, combing/brushing;wash face, hands  -LW      Grooming Position  supported sitting  -LW      Recorded by [LW] Madeline Young COTA/L 10/17/19 1024      Row Name 10/17/19 0715             Self-Feeding Assessment/Training    [place] Level (Feeding)  feeding skills;liquids to mouth;prepare tray/open items;scoop food and bring to mouth;conditional independence;set up  -LW      Self-Feeding Assess/Train, Spillage Amount  minimal  -LW      Position (Self-Feeding)  supported sitting  -LW      Comment (Feeding)  Pt needing assistance to set up tray and open items.   -LW      Recorded by [LW] Madeline Young COTA/L 10/17/19 1024      Row Name 10/17/19 0715             Toileting Assessment/Training    [place] Level (Toileting)  toileting skills;adjust/manage clothing;perform perineal hygiene;contact guard assist  -LW      Assistive Devices (Toileting)  commode;grab bar/safety frame  -LW      Toileting Position  supported sitting  -LW      Comment (Toileting)  Pt needing assistance with pericare.   -LW      Recorded by [LW] Madeline Young COTA/PALU 10/17/19  1024      Row Name 10/17/19 0715             BADL Safety/Performance    Impairments, BADL Safety/Performance  endurance/activity tolerance  -LW      Recorded by [LW] Madeline Young COTA/L 10/17/19 1024      Row Name 10/17/19 1044             Therapeutic Exercise    Lower Extremity (Therapeutic Exercise)  LAQ (long arc quad), bilateral;marching while seated  -TA      Lower Extremity Range of Motion (Therapeutic Exercise)  ankle dorsiflexion/plantar flexion, bilateral;hip abduction/adduction, bilateral  -TA      Exercise Type (Therapeutic Exercise)  AROM (active range of motion)  -TA      Position (Therapeutic Exercise)  seated  -TA      Sets/Reps (Therapeutic Exercise)  10  -TA      Expected Outcome (Therapeutic Exercise)  improve functional stability;facilitate normal movement patterns;improve motor control;increase active range of motion  -TA      Recorded by [TA] Josefina Alves, PTA 10/17/19 1136      Row Name 10/17/19 1044 10/17/19 0715          Positioning and Restraints    Pre-Treatment Position  sitting in chair/recliner  -TA  in bed  -LW     Post Treatment Position  chair  -TA  chair  -LW     In Chair  sitting;with family/caregiver  -TA  reclined;call light within reach;encouraged to call for assist;exit alarm on;with family/caregiver  -LW     Recorded by [TA] Josefina Alves, PTA 10/17/19 1136 [LW] Madeline Young COTA/L 10/17/19 1024     Row Name 10/17/19 1044 10/17/19 0715          Pain Scale: Numbers Pre/Post-Treatment    Pain Scale: Numbers, Pretreatment  0/10 - no pain  -TA  0/10 - no pain  -LW     Pain Scale: Numbers, Post-Treatment  0/10 - no pain  -TA  0/10 - no pain  -LW     Recorded by [TA] Josefina Alves, PTA 10/17/19 1136 [LW] Madeline Young COTA/L 10/17/19 1024     Row Name 10/17/19 1044             Sensory Assessment/Intervention    Sensory General Assessment  other (see comments) NT due to deaf, non-verbal  -TA      Recorded by [TA] Josefina Alves, PTA 10/17/19 1136      Row Name  10/17/19 1044             Vision Assessment/Intervention    Visual Impairment/Limitations  corrective lenses full time  -TA      Recorded by [TA] Josefina Alves, PTA 10/17/19 1136      Row Name 10/17/19 0715             Coping    Observed Emotional State  cooperative  -LW      Verbalized Emotional State  acceptance  -LW      Recorded by [LW] Madeline Young COTA/L 10/17/19 1024      Row Name 10/17/19 0715             Plan of Care Review    Plan of Care Reviewed With  patient  -LW      Recorded by [LW] Madeline Young COTA/L 10/17/19 1024      Row Name 10/17/19 0715             Outcome Summary/Treatment Plan (OT)    Daily Summary of Progress (OT)  progress toward functional goals is good  -LW      Plan for Continued Treatment (OT)  Continue POC  -LW      Anticipated Discharge Disposition (OT)  anticipate therapy at next level of care  -LW      Recorded by [LW] Madeline Young COTA/L 10/17/19 1024      Row Name 10/17/19 1044             Outcome Summary/Treatment Plan (PT)    Daily Summary of Progress (PT)  progress toward functional goals is good  -TA      Plan for Continued Treatment (PT)  continue  -TA      Anticipated Discharge Disposition (PT)  home with 24/7 care  -TA      Recorded by [TA] Josefina Alves, PTA 10/17/19 1136        User Key  (r) = Recorded By, (t) = Taken By, (c) = Cosigned By    Initials Name Effective Dates Discipline    TA Josefina Alves, PTA 03/07/18 -  PT    LW Madeline Young STEPHENS/L 03/07/18 -  OT               Rehab Goal Summary     Row Name 10/17/19 0715             Occupational Therapy Goals    Transfer Goal Selection (OT)  transfer, OT goal 1  -LW      Bathing Goal Selection (OT)  bathing, OT goal 1  -LW      Dressing Goal Selection (OT)  dressing, OT goal 1  -LW      Toileting Goal Selection (OT)  toileting, OT goal 1  -LW      Grooming Goal Selection (OT)  grooming, OT goal 1  -LW      Self-Feeding Goal Selection (OT)  self feeding, OT goal 1  -LW      Functional Mobility  Goal Selection (OT)  functional mobility, OT goal 1  -LW         Transfer Goal 1 (OT)    Activity/Assistive Device (Transfer Goal 1, OT)  toilet  -LW      [place] Level/Cues Needed (Transfer Goal 1, OT)  standby assist  -LW      Time Frame (Transfer Goal 1, OT)  long term goal (LTG);by discharge  -LW      Progress/Outcome (Transfer Goal 1, OT)  goal met  (Significant)   -LW         Bathing Goal 1 (OT)    Activity/Assistive Device (Bathing Goal 1, OT)  bathing skills, all  -LW      [place] Level/Cues Needed (Bathing Goal 1, OT)  set-up required;standby assist;tactile cues required  -LW      Time Frame (Bathing Goal 1, OT)  long term goal (LTG);by discharge  -LW      Progress/Outcomes (Bathing Goal 1, OT)  goal met  (Significant)   -LW         Dressing Goal 1 (OT)    Activity/Assistive Device (Dressing Goal 1, OT)  dressing skills, all  -LW      [place]/Cues Needed (Dressing Goal 1, OT)  set-up required;tactile cues required;standby assist  -LW      Time Frame (Dressing Goal 1, OT)  long term goal (LTG);by discharge  -LW      Progress/Outcome (Dressing Goal 1, OT)  goal met  (Significant)   -LW         Toileting Goal 1 (OT)    Activity/Device (Toileting Goal 1, OT)  toileting skills, all  -LW      [place] Level/Cues Needed (Toileting Goal 1, OT)  set-up required;tactile cues required;standby assist  -LW      Time Frame (Toileting Goal 1, OT)  long term goal (LTG);by discharge  -LW      Progress/Outcome (Toileting Goal 1, OT)  goal met  (Significant)   -LW         Grooming Goal 1 (OT)    Activity/Device (Grooming Goal 1, OT)  grooming skills, all  -LW      [place] (Grooming Goal 1, OT)  set-up required;tactile cues required;standby assist  -LW      Time Frame (Grooming Goal 1, OT)  long term goal (LTG);by discharge  -LW      Progress/Outcome (Grooming Goal 1, OT)  goal met  (Significant)   -LW         Self-Feeding Goal 1 (OT)    Activity/Assistive Device (Self-Feeding Goal 1, OT)  self-feeding  skills, all  -LW      [place] Level/Cues Needed (Self-Feeding Goal 1, OT)  set-up required;tactile cues required;standby assist  -LW      Time Frame (Self-Feeding Goal 1, OT)  long term goal (LTG);by discharge  -LW      Progress/Outcomes (Self-Feeding Goal 1, OT)  goal met  (Significant)   -LW         Functional Mobility Goal 1 (OT)    Activity/Assistive Device (Functional Mobility Goal 1, OT)  -- as needed  -LW      [place] Level/Cues Needed (Functional Mobility Goal 1, OT)  standby assist  -LW      Distance Goal 1 (Functional Mobility, OT)  for ADL tasks with no LOB and fair safety   -LW      Time Frame (Functional Mobility Goal 1, OT)  long term goal (LTG);by discharge  -LW      Progress/Outcome (Functional Mobility Goal 1, OT)  goal not met  -LW        User Key  (r) = Recorded By, (t) = Taken By, (c) = Cosigned By    Initials Name Provider Type Discipline    Madeline Long COTA/L Occupational Therapy Assistant OT              PT Recommendation and Plan  Anticipated Discharge Disposition (PT): home with 24/7 care  Therapy Frequency (PT Clinical Impression): daily  Outcome Summary/Treatment Plan (PT)  Daily Summary of Progress (PT): progress toward functional goals is good  Plan for Continued Treatment (PT): continue  Anticipated Discharge Disposition (PT): home with 24/7 care  Progress: improving  Outcome Summary: pt sit<>stand with SBA, pt ambulated 250` x 2 without AD with SBA. pt will require 24/7 care @ D/C  Outcome Measures     Row Name 10/17/19 1100 10/17/19 0715 10/16/19 1300       How much help from another person do you currently need...    Turning from your back to your side while in flat bed without using bedrails?  3  -TA  --  3  -TA    Moving from lying on back to sitting on the side of a flat bed without bedrails?  3  -TA  --  3  -TA    Moving to and from a bed to a chair (including a wheelchair)?  3  -TA  --  3  -TA    Standing up from a chair using your arms (e.g., wheelchair,  bedside chair)?  3  -TA  --  3  -TA    Climbing 3-5 steps with a railing?  3  -TA  --  3  -TA    To walk in hospital room?  3  -TA  --  3  -TA    AM-PAC 6 Clicks Score (PT)  18  -TA  --  18  -TA       How much help from another is currently needed...    Putting on and taking off regular lower body clothing?  --  3  -LW  --    Bathing (including washing, rinsing, and drying)  --  3  -LW  --    Toileting (which includes using toilet bed pan or urinal)  --  3  -LW  --    Putting on and taking off regular upper body clothing  --  3  -LW  --    Taking care of personal grooming (such as brushing teeth)  --  3  -LW  --    Eating meals  --  3  -LW  --    AM-PAC 6 Clicks Score (OT)  --  18  -LW  --       Functional Assessment    Outcome Measure Options  AM-[facility] 6 Clicks Basic Mobility (PT)  -TA  --  AM-[facility] 6 Clicks Basic Mobility (PT)  -TA    Row Name 10/16/19 0705 10/15/19 1700 10/15/19 1240       How much help from another person do you currently need...    Turning from your back to your side while in flat bed without using bedrails?  --  3  -TA  --    Moving from lying on back to sitting on the side of a flat bed without bedrails?  --  3  -TA  --    Moving to and from a bed to a chair (including a wheelchair)?  --  3  -TA  --    Standing up from a chair using your arms (e.g., wheelchair, bedside chair)?  --  3  -TA  --    Climbing 3-5 steps with a railing?  --  3  -TA  --    To walk in hospital room?  --  3  -TA  --    AM-PAC 6 Clicks Score (PT)  --  18  -TA  --       How much help from another is currently needed...    Putting on and taking off regular lower body clothing?  3  -LW  --  3  -LW    Bathing (including washing, rinsing, and drying)  3  -LW  --  3  -LW    Toileting (which includes using toilet bed pan or urinal)  3  -LW  --  3  -LW    Putting on and taking off regular upper body clothing  3  -LW  --  3  -LW    Taking care of personal grooming (such as brushing teeth)  3  -LW  --  3  -LW    Eating meals  3  -LW   --  3  -LW    AM-PAC 6 Clicks Score (OT)  18  -LW  --  18  -LW       Functional Assessment    Outcome Measure Options  --  AM-PAC 6 Clicks Basic Mobility (PT)  -TA  --    Row Name 10/14/19 1323 10/14/19 1300          How much help from another person do you currently need...    Turning from your back to your side while in flat bed without using bedrails?  --  3  -TA     Moving from lying on back to sitting on the side of a flat bed without bedrails?  --  3  -TA     Moving to and from a bed to a chair (including a wheelchair)?  --  3  -TA     Standing up from a chair using your arms (e.g., wheelchair, bedside chair)?  --  3  -TA     Climbing 3-5 steps with a railing?  --  3  -TA     To walk in hospital room?  --  3  -TA     AM-PAC 6 Clicks Score (PT)  --  18  -TA        How much help from another is currently needed...    Putting on and taking off regular lower body clothing?  3  -BH  --     Bathing (including washing, rinsing, and drying)  3  -BH  --     Toileting (which includes using toilet bed pan or urinal)  3  -BH  --     Putting on and taking off regular upper body clothing  3  -BH  --     Taking care of personal grooming (such as brushing teeth)  3  -BH  --     Eating meals  3  -BH  --     AM-PAC 6 Clicks Score (OT)  18  -BH  --        Functional Assessment    Outcome Measure Options  AM-PAC 6 Clicks Daily Activity (OT)  -  AM-PAC 6 Clicks Basic Mobility (PT)  -TA       User Key  (r) = Recorded By, (t) = Taken By, (c) = Cosigned By    Initials Name Provider Type     Cielo Stewart, OTR/L Occupational Therapist    Josefina Bahena, PTA Physical Therapy Assistant    Madeline Long, STEPHENS/L Occupational Therapy Assistant         Time Calculation:   PT Charges     Row Name 10/17/19 1138             Time Calculation    Start Time  1044  -TA      Stop Time  1112  -TA      Time Calculation (min)  28 min  -TA      PT Received On  10/17/19  -TA         Time Calculation- PT    Total Timed Code Minutes- PT   28 minute(s)  -MONROE        User Key  (r) = Recorded By, (t) = Taken By, (c) = Cosigned By    Initials Name Provider Type    Josefina Bahena PTA Physical Therapy Assistant        Therapy Charges for Today     Code Description Service Date Service Provider Modifiers Qty    23943731080 HC GAIT TRAINING EA 15 MIN 10/16/2019 Josefina Alves, PTA GP 1    37648384142 HC PT THERAPEUTIC ACT EA 15 MIN 10/16/2019 Josefina Alves, PTA GP 1    07452457217 HC PT THER PROC EA 15 MIN 10/16/2019 Josefina Alves, PTA GP 1    89400408396 HC GAIT TRAINING EA 15 MIN 10/17/2019 Josefina Alves, PTA GP 1    03128814427 HC PT THER PROC EA 15 MIN 10/17/2019 Josefina Alves, PTA GP 1          PT G-Codes  Outcome Measure Options: AM-PAC 6 Clicks Basic Mobility (PT)  AM-PAC 6 Clicks Score (PT): 18  AM-PAC 6 Clicks Score (OT): 18    Josefina Alves PTA  10/17/2019

## 2019-12-11 ENCOUNTER — LAB REQUISITION (OUTPATIENT)
Dept: LAB | Facility: HOSPITAL | Age: 74
End: 2019-12-11

## 2019-12-11 DIAGNOSIS — Z00.00 ROUTINE GENERAL MEDICAL EXAMINATION AT A HEALTH CARE FACILITY: ICD-10-CM

## 2020-10-15 ENCOUNTER — TELEPHONE (OUTPATIENT)
Dept: FAMILY MEDICINE CLINIC | Facility: CLINIC | Age: 75
End: 2020-10-15

## 2020-10-15 DIAGNOSIS — F41.9 ANXIETY: Primary | ICD-10-CM

## 2020-10-15 RX ORDER — CLONAZEPAM 0.5 MG/1
0.5 TABLET ORAL 3 TIMES DAILY
Qty: 90 TABLET | Refills: 0 | OUTPATIENT
Start: 2020-10-15 | End: 2022-08-04

## 2020-10-15 NOTE — TELEPHONE ENCOUNTER
Rec'd req from Sherry Bowens from [facility]. Requesting to order and administer Pneumonia Vaccine. Please advise.

## 2020-10-15 NOTE — TELEPHONE ENCOUNTER
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 Mays. Medication requested is not listed in Pt's chart.

## 2020-10-20 ENCOUNTER — NURSING HOME (OUTPATIENT)
Dept: FAMILY MEDICINE CLINIC | Facility: CLINIC | Age: 75
End: 2020-10-20

## 2020-10-20 DIAGNOSIS — I50.42 CHRONIC COMBINED SYSTOLIC AND DIASTOLIC CONGESTIVE HEART FAILURE (HCC): ICD-10-CM

## 2020-10-20 DIAGNOSIS — E11.9 TYPE 2 DIABETES MELLITUS WITHOUT COMPLICATION, WITHOUT LONG-TERM CURRENT USE OF INSULIN (HCC): ICD-10-CM

## 2020-10-20 DIAGNOSIS — I10 ESSENTIAL HYPERTENSION: Primary | ICD-10-CM

## 2020-10-20 PROCEDURE — 99308 SBSQ NF CARE LOW MDM 20: CPT | Performed by: FAMILY MEDICINE

## 2020-10-21 ENCOUNTER — OUTSIDE FACILITY SERVICE (OUTPATIENT)
Dept: FAMILY MEDICINE CLINIC | Facility: CLINIC | Age: 75
End: 2020-10-21

## 2020-10-21 PROCEDURE — 99219 PR INITIAL OBSERVATION CARE/DAY 50 MINUTES: CPT | Performed by: FAMILY MEDICINE

## 2020-10-29 PROBLEM — I50.42 CHRONIC COMBINED SYSTOLIC AND DIASTOLIC CONGESTIVE HEART FAILURE (HCC): Status: ACTIVE | Noted: 2020-10-29

## 2020-10-29 NOTE — PATIENT INSTRUCTIONS
Suspect Essential HTN.Good BP control is encouraged with Goal BP based on JNC 8 guidelines 2014 <140/90 for patients with known cardiac disease and diabetes. (YANDY. 2014:322 (5):507-520. doi:10.1001/yandy.2013.87335): general population <60 yr old goal BP <140/90 and for those >60 <150/90.  For patients of all ages with Diabetes, CKD, Known CAD <140/90. Recommended to the patient to obtain electronic home BP machine with upper arm blood pressure cuff and to check regularly as instructed.  Keep BP log and bring to subsequent visits. Stable, at goal.  a. LABS: routine for hypertension recommended and ordered if necessary.  b. Recommend if you do not have a home BP machine to obtain an electronic machine with arm blood pressure cuff.      c. Monitor BP over the next week and keep log to bring back to office. Discussed medication therapy however pt wants to try to control with diet exercise. .  Your provider  has recommended self-monitoring of your blood pressure.  If you do not have a blood pressure cuff you may purchase one from the local pharmacy.  You may ask the pharmacist which brand and model they recommend.  Obtain your blood pressure measurement at least 2x per week.  You should also check your blood pressure if you experience any symptoms of blurred visit, dizziness or headache.  Please record all blood pressure measurements and bring them to next office visit.  If you have any questions about the accuracy of your blood pressure machine please bring it in to the office and our staff will be happy to check accuracy.   d. Encouraged to eat a low sodium heart healthy diet  e. Offered handout on HTN educational topics.  These were provided if patient requested these today.  f. MEDS: as listed in today's visit.  g. Risks/benefits of current and new medications discussed with the patient and or family today.  The patient/family are aware and accept that if there any side effects they should call or return to clinic  as soon as possible.  Appropriate F/U discussed for topics addressed today. All questions were answered to the  satisfactory state of patient/family.  Should symptoms fail to improve or worsen they agree to call or return to clinic or to go to the ER. Education handouts were offered on any new Rx if requested.  Discussed the importance of following up with any needed screening tests/labs/specialist appointments and any requested follow-up recommended by me today.  Importance of maintaining follow-up discussed and patient accepts that missed appointments can delay diagnosis and potentially lead to worsening of conditions.      Heart Failure, Diagnosis    Heart failure is a condition in which the heart has trouble pumping blood because it has become weak or stiff. This means that the heart does not pump blood well enough for the body to stay healthy. For some people with heart failure, fluid may back up into the lungs. There may also be swelling (edema) in the lower legs. Heart failure is usually a long-term (chronic) condition. It is important for you to take good care of yourself and follow the treatment plan from your health care provider.  What are the causes?  This condition may be caused by:  · High blood pressure (hypertension). Hypertension causes the heart muscle to work harder than normal. This makes the heart stiff or weak.  · Coronary artery disease, or CAD. CAD is the buildup of cholesterol and fat (plaque) in the arteries of the heart.  · Heart attack, also called myocardial infarction. This injures the heart muscle, making it hard for the heart to pump blood.  · Abnormal heart valves. The valves do not open and close properly, forcing the heart to pump harder to keep the blood flowing.  · Heart muscle disease (cardiomyopathy or myocarditis). This is damage to the heart muscle. It can increase the risk of heart failure.  · Lung disease. The heart works harder when the lungs are not healthy.  · Abnormal  heart rhythms. These can lead to heart failure.  What increases the risk?  The risk of heart failure increases as a person ages. This condition is also more likely to develop in people who:  · Are overweight.  · Are male.  · Smoke or chew tobacco.  · Abuse alcohol or illegal drugs.  · Have taken medicines that can damage the heart, such as chemotherapy drugs.  · Have diabetes.  · Have abnormal heart rhythms.  · Have thyroid problems.  · Have low blood counts (anemia).  What are the signs or symptoms?  Symptoms of this condition include:  · Shortness of breath with activity, such as when climbing stairs.  · A cough that does not go away.  · Swelling of the feet, ankles, legs, or abdomen.  · Losing weight for no reason.  · Trouble breathing when lying flat (orthopnea).  · Waking from sleep because of the need to sit up and get more air.  · Rapid heartbeat.  · Tiredness (fatigue) and loss of energy.  · Feeling light-headed, dizzy, or close to fainting.  · Loss of appetite.  · Nausea.  · Waking up more often during the night to urinate (nocturia).  · Confusion.  How is this diagnosed?  This condition is diagnosed based on:  · Your medical history, symptoms, and a physical exam.  · Diagnostic tests, which may include:  ? Echocardiogram.  ? Electrocardiogram (ECG).  ? Chest X-ray.  ? Blood tests.  ? Exercise stress test.  ? Radionuclide scans.  ? Cardiac catheterization and angiogram.  How is this treated?  Treatment for this condition is aimed at managing the symptoms of heart failure.  Medicines  Treatment may include medicines that:  · Help lower blood pressure by relaxing (dilating) the blood vessels. These medicines are called ACE inhibitors (angiotensin-converting enzyme) and ARBs (angiotensin receptor blockers).  · Cause the kidneys to remove salt and water from the blood through urination (diuretics).  · Improve heart muscle strength and prevent the heart from beating too fast (beta blockers).  · Increase the  force of the heartbeat (digoxin).  Healthy behavior changes         Treatment may also include making healthy lifestyle changes, such as:  · Reaching and staying at a healthy weight.  · Quitting smoking or chewing tobacco.  · Eating heart-healthy foods.  · Limiting or avoiding alcohol.  · Stopping the use of illegal drugs.  · Being physically active.    Other treatments  Other treatments may include:  · Procedures to open blocked arteries or repair damaged valves.  · Placing a pacemaker to improve heart function (cardiac resynchronization therapy).  · Placing a device to treat serious abnormal heart rhythms (implantable cardioverter defibrillator, or ICD).  · Placing a device to improve the pumping ability of the heart (left ventricular assist device, or LVAD).  · Receiving a healthy heart from a donor (heart transplant). This is done when other treatments have not helped.  Follow these instructions at home:  · Manage other health conditions as told by your health care provider. These may include hypertension, diabetes, thyroid disease, or abnormal heart rhythms.  · Get ongoing education and support as needed. Learn as much as you can about heart failure.  · Keep all follow-up visits as told by your health care provider. This is important.  Summary  · Heart failure is a condition in which the heart has trouble pumping blood because it has become weak or stiff.  · This condition is caused by high blood pressure and other diseases of the heart and lungs.  · Symptoms of this condition include shortness of breath, tiredness (fatigue), nausea, and swelling of the feet, ankles, legs, or abdomen.  · Treatments for this condition may include medicines, lifestyle changes, and surgery.  · Manage other health conditions as told by your health care provider.  This information is not intended to replace advice given to you by your health care provider. Make sure you discuss any questions you have with your health care  provider.  Document Released: 12/18/2006 Document Revised: 03/06/2020 Document Reviewed: 03/06/2020  [employer] Patient Education © 2020 [employer]    Diabetes: Insulin non dependent. Nephropathy, Retinopathy, Neuropahty status discussed.  Discussed goals of Diabetes today.  Goal Hgb A1C <7.0 for most patient.  Good BP control is encouraged with Goal BP based on JNC 8 guidelines 2014 <140/90.  Discussed role of Ace-I and ARB with DM.  DM imparts risk equivalence for CAD based on ATP III.  Current guidelines support moderate intensity statin with goal of 30-50% reduction in LDL unless 10 yr risk ASCVD >7.5 then high intensity should be used. Close monitoring of Lipid levels encouraged. Recommend once yearly eye evaluation by optometry or ophthalmology.  Good foot health discussed and foot exam completed.  Recommended toe health, wear good shoes, cut nails straight across and tend calluses if present. Take medications as encouraged.  Monitor blood sugars as encouraged and bring log to future meetings. Weight needs to be monitored. Monitor portions and caloric intake.  Pneumovax frequency discussed.   a. Labs: CMP, Microalbumin, A1C  b. Encouraged pt to bring glucose logs to each appointment  c. Encouraged self foot exams, and yearly eye exams.  d. Encouraged to lose weight/please see information provided  e. Recommend regular exercise   f. Medications as listed in today's visit

## 2020-10-29 NOTE — PROGRESS NOTES
Nursing Home Monthly Visit Note      Harshad Mays MD   [x]  [address] [place] [address]  Phone: [phone]  Fax: [phone]      PATIENT NAME: Melissa Parham                                                                          YOB: 1945            DATE OF SERVICE: 10/20/2020    FACILITY: [facility] ____________________________________________________________________     CHIEF COMPLAINT:  Patient is seen for nursing home follow up visit for : Hypertension, Diabetes and CHF (Congestive Heart Failure)      HISTORY OF PRESENT ILLNESS:   BRENDA Torres is a 75 y.o. year old female who is evaluated today at the nursing home for a nursing home follow-up visit, for the above complaints and conditions. I've reviewed the patient's weights, vitals recorded in nursing notes, and have made a brief chart review, in addition to discussion with the patient/nursing staff about the patient's total care.    Voices no new complaints, confirmed with nursing., Vital signs stable as recorded and reviewed. , BP is stable. , FSBS reviewed and are running fairly NORMAL.  and Some dyspnea noted.    PAST MEDICAL & SURGICAL HISTORY, FAMILY and SOCIAL HISTORY:   These have been reviewed thoroughly by myself through the nursing home chart and Epic, and reconciled accordingly.    MEDICATIONS:  I have reviewed and reconciled the patients medication list in the patients chart at the skilled nursing facility today.      ALLERGIES:  No Known Allergies     REVIEW OF SYSTEMS:  No fever or chills. No weight change. No chest pain or pressure. No shortness of breath. No abdominal pain or blood in stool. No difficulty with urination. No headache or syncope. No skin rash.      PHYSICAL EXAMINATION:   VITAL SIGNS reviewed from nursing notes at visit.  CHEST: clear bilaterally, without wheezes. No distress.  HEART: regular rate and rhythm, no murmurs.  ABDOMEN: soft, non-tender,  no rebound or guarding, bowel sounds present.  EXTREM: (CVI) chronic venous insufficiency noted.  SKIN: warm, dry, no rashes, good color and turgor.  MENTAL: alert, cooperative, pleasant, but confused.      RECORDS REVIEW:   I have reviewed and interpreted the most recent labs and XRays and consults present in the chart today.     ASSESSMENT   Problem List Items Addressed This Visit        Cardiovascular and Mediastinum    Essential hypertension - Primary    Chronic combined systolic and diastolic congestive heart failure (CMS/HCC)       Endocrine    DMII (diabetes mellitus, type 2) (CMS/[facility])          PLAN  Discussed the patient with nursing/staff, and addressed all needs today.  Plan of Care Reviewed.   Tests, including labs and/or Xrays/imaging, were reviewed at this visit, copies in the NH chart.   For any adjustments listed in this plan, orders were given/written today to be implemented and entered in the NH EMR.   Continue with current treatment plan, including her medications and close monitoring of underlying medical conditions.   BP meds were continued and will be adjusted as necessary.  DIET was continued and not changed at this time.      Will follow-up the patient in the nursing home as per protocol for their level of nursing care.  Return in about 4 weeks (around 11/17/2020).    Total face to face time spent with patient was at least 15 minutes, of which at least 50% was in counseling the patient and/or family/caregiver.         Harshad Mays MD, FAAFP  10/20/2020

## 2020-12-20 ENCOUNTER — APPOINTMENT (OUTPATIENT)
Dept: GENERAL RADIOLOGY | Age: 75
DRG: 247 | End: 2020-12-20
Payer: MEDICARE

## 2020-12-20 ENCOUNTER — HOSPITAL ENCOUNTER (INPATIENT)
Age: 75
LOS: 3 days | Discharge: HOME | DRG: 247 | End: 2020-12-23
Attending: EMERGENCY MEDICINE | Admitting: HOSPITALIST
Payer: MEDICARE

## 2020-12-20 ENCOUNTER — APPOINTMENT (OUTPATIENT)
Dept: CT IMAGING | Age: 75
DRG: 247 | End: 2020-12-20
Payer: MEDICARE

## 2020-12-20 PROBLEM — N39.0 UTI (URINARY TRACT INFECTION): Status: ACTIVE | Noted: 2020-12-20

## 2020-12-20 PROBLEM — R77.8 ELEVATED TROPONIN: Status: ACTIVE | Noted: 2020-12-20

## 2020-12-20 PROBLEM — I44.7 LBBB (LEFT BUNDLE BRANCH BLOCK): Status: ACTIVE | Noted: 2020-12-20

## 2020-12-20 PROBLEM — I10 ESSENTIAL HYPERTENSION: Status: ACTIVE | Noted: 2019-10-17

## 2020-12-20 PROBLEM — I50.42 CHRONIC COMBINED SYSTOLIC AND DIASTOLIC CONGESTIVE HEART FAILURE (HCC): Status: ACTIVE | Noted: 2020-10-29

## 2020-12-20 PROBLEM — E11.9 DMII (DIABETES MELLITUS, TYPE 2) (HCC): Status: ACTIVE | Noted: 2019-10-17

## 2020-12-20 LAB
ABO/RH: NORMAL
ADENOVIRUS BY PCR: NOT DETECTED
ALBUMIN SERPL-MCNC: 3.6 G/DL (ref 3.5–5.2)
ALP BLD-CCNC: 88 U/L (ref 35–104)
ALT SERPL-CCNC: 10 U/L (ref 5–33)
ANION GAP SERPL CALCULATED.3IONS-SCNC: 15 MMOL/L (ref 7–19)
ANTIBODY SCREEN: NORMAL
APTT: 114 SEC (ref 26–36.2)
APTT: 27.9 SEC (ref 26–36.2)
APTT: 41.8 SEC (ref 26–36.2)
APTT: 54.6 SEC (ref 26–36.2)
APTT: 56.9 SEC (ref 26–36.2)
AST SERPL-CCNC: 17 U/L (ref 5–32)
BACTERIA: ABNORMAL /HPF
BASOPHILS ABSOLUTE: 0 K/UL (ref 0–0.2)
BASOPHILS ABSOLUTE: 0 K/UL (ref 0–0.2)
BASOPHILS RELATIVE PERCENT: 0.1 % (ref 0–1)
BASOPHILS RELATIVE PERCENT: 0.2 % (ref 0–1)
BILIRUB SERPL-MCNC: 0.4 MG/DL (ref 0.2–1.2)
BILIRUBIN URINE: NEGATIVE
BLOOD, URINE: ABNORMAL
BORDETELLA PARAPERTUSSIS BY PCR: NOT DETECTED
BORDETELLA PERTUSSIS BY PCR: NOT DETECTED
BUN BLDV-MCNC: 33 MG/DL (ref 8–23)
CALCIUM SERPL-MCNC: 8.6 MG/DL (ref 8.8–10.2)
CHLAMYDOPHILIA PNEUMONIAE BY PCR: NOT DETECTED
CHLORIDE BLD-SCNC: 92 MMOL/L (ref 98–111)
CHOLESTEROL, TOTAL: 95 MG/DL (ref 160–199)
CLARITY: CLEAR
CO2: 27 MMOL/L (ref 22–29)
COLOR: YELLOW
CORONAVIRUS 229E BY PCR: NOT DETECTED
CORONAVIRUS HKU1 BY PCR: NOT DETECTED
CORONAVIRUS NL63 BY PCR: NOT DETECTED
CORONAVIRUS OC43 BY PCR: NOT DETECTED
CREAT SERPL-MCNC: 1.3 MG/DL (ref 0.5–0.9)
EOSINOPHILS ABSOLUTE: 0 K/UL (ref 0–0.6)
EOSINOPHILS ABSOLUTE: 0 K/UL (ref 0–0.6)
EOSINOPHILS RELATIVE PERCENT: 0.1 % (ref 0–5)
EOSINOPHILS RELATIVE PERCENT: 0.6 % (ref 0–5)
EPITHELIAL CELLS, UA: ABNORMAL /HPF
GFR AFRICAN AMERICAN: 48
GFR NON-AFRICAN AMERICAN: 40
GLUCOSE BLD-MCNC: 120 MG/DL (ref 70–99)
GLUCOSE BLD-MCNC: 195 MG/DL (ref 70–99)
GLUCOSE BLD-MCNC: 223 MG/DL (ref 74–109)
GLUCOSE BLD-MCNC: 327 MG/DL (ref 70–99)
GLUCOSE URINE: NEGATIVE MG/DL
HBA1C MFR BLD: 6.9 % (ref 4–6)
HCT VFR BLD CALC: 33.1 % (ref 37–47)
HCT VFR BLD CALC: 33.4 % (ref 37–47)
HDLC SERPL-MCNC: 40 MG/DL (ref 65–121)
HEMOGLOBIN: 10.5 G/DL (ref 12–16)
HEMOGLOBIN: 10.6 G/DL (ref 12–16)
HUMAN METAPNEUMOVIRUS BY PCR: NOT DETECTED
HUMAN RHINOVIRUS/ENTEROVIRUS BY PCR: NOT DETECTED
IMMATURE GRANULOCYTES #: 0 K/UL
IMMATURE GRANULOCYTES #: 0 K/UL
INFLUENZA A BY PCR: NOT DETECTED
INFLUENZA B BY PCR: NOT DETECTED
INR BLD: 1.07 (ref 0.88–1.18)
KETONES, URINE: NEGATIVE MG/DL
LACTIC ACID: 0.8 MMOL/L (ref 0.5–1.9)
LACTIC ACID: 2.5 MMOL/L (ref 0.5–1.9)
LDL CHOLESTEROL CALCULATED: 36 MG/DL
LEUKOCYTE ESTERASE, URINE: ABNORMAL
LYMPHOCYTES ABSOLUTE: 0.3 K/UL (ref 1.1–4.5)
LYMPHOCYTES ABSOLUTE: 0.7 K/UL (ref 1.1–4.5)
LYMPHOCYTES RELATIVE PERCENT: 10.7 % (ref 20–40)
LYMPHOCYTES RELATIVE PERCENT: 4 % (ref 20–40)
MAGNESIUM: 1.1 MG/DL (ref 1.6–2.4)
MCH RBC QN AUTO: 26.1 PG (ref 27–31)
MCH RBC QN AUTO: 26.5 PG (ref 27–31)
MCHC RBC AUTO-ENTMCNC: 31.7 G/DL (ref 33–37)
MCHC RBC AUTO-ENTMCNC: 31.7 G/DL (ref 33–37)
MCV RBC AUTO: 82.1 FL (ref 81–99)
MCV RBC AUTO: 83.5 FL (ref 81–99)
MONOCYTES ABSOLUTE: 0.7 K/UL (ref 0–0.9)
MONOCYTES ABSOLUTE: 1 K/UL (ref 0–0.9)
MONOCYTES RELATIVE PERCENT: 14.5 % (ref 0–10)
MONOCYTES RELATIVE PERCENT: 9 % (ref 0–10)
MYCOPLASMA PNEUMONIAE BY PCR: NOT DETECTED
NEUTROPHILS ABSOLUTE: 4.9 K/UL (ref 1.5–7.5)
NEUTROPHILS ABSOLUTE: 6.7 K/UL (ref 1.5–7.5)
NEUTROPHILS RELATIVE PERCENT: 73.7 % (ref 50–65)
NEUTROPHILS RELATIVE PERCENT: 86.4 % (ref 50–65)
NITRITE, URINE: POSITIVE
PARAINFLUENZA VIRUS 1 BY PCR: NOT DETECTED
PARAINFLUENZA VIRUS 2 BY PCR: NOT DETECTED
PARAINFLUENZA VIRUS 3 BY PCR: NOT DETECTED
PARAINFLUENZA VIRUS 4 BY PCR: NOT DETECTED
PDW BLD-RTO: 14.1 % (ref 11.5–14.5)
PDW BLD-RTO: 14.3 % (ref 11.5–14.5)
PERFORMED ON: ABNORMAL
PH UA: 5 (ref 5–8)
PHOSPHORUS: 2.9 MG/DL (ref 2.5–4.5)
PLATELET # BLD: 126 K/UL (ref 130–400)
PLATELET # BLD: 131 K/UL (ref 130–400)
PMV BLD AUTO: 10 FL (ref 9.4–12.3)
PMV BLD AUTO: 9.6 FL (ref 9.4–12.3)
POTASSIUM REFLEX MAGNESIUM: 3.7 MMOL/L (ref 3.5–5)
PRO-BNP: 2175 PG/ML (ref 0–1800)
PROTEIN UA: 30 MG/DL
PROTHROMBIN TIME: 13.9 SEC (ref 12–14.6)
RBC # BLD: 4 M/UL (ref 4.2–5.4)
RBC # BLD: 4.03 M/UL (ref 4.2–5.4)
RBC UA: ABNORMAL /HPF (ref 0–2)
RESPIRATORY SYNCYTIAL VIRUS BY PCR: NOT DETECTED
SARS-COV-2, PCR: NOT DETECTED
SODIUM BLD-SCNC: 134 MMOL/L (ref 136–145)
SPECIFIC GRAVITY UA: 1.01 (ref 1–1.03)
TOTAL PROTEIN: 6.9 G/DL (ref 6.6–8.7)
TRIGL SERPL-MCNC: 97 MG/DL (ref 0–149)
TROPONIN: 0.08 NG/ML (ref 0–0.03)
TROPONIN: 0.22 NG/ML (ref 0–0.03)
TROPONIN: 0.32 NG/ML (ref 0–0.03)
TROPONIN: 0.49 NG/ML (ref 0–0.03)
TSH REFLEX FT4: 1.89 UIU/ML (ref 0.35–5.5)
UROBILINOGEN, URINE: 0.2 E.U./DL
WBC # BLD: 6.6 K/UL (ref 4.8–10.8)
WBC # BLD: 7.8 K/UL (ref 4.8–10.8)
WBC UA: ABNORMAL /HPF (ref 0–5)

## 2020-12-20 PROCEDURE — 83735 ASSAY OF MAGNESIUM: CPT

## 2020-12-20 PROCEDURE — 99999 PR OFFICE/OUTPT VISIT,PROCEDURE ONLY: CPT | Performed by: EMERGENCY MEDICINE

## 2020-12-20 PROCEDURE — 96365 THER/PROPH/DIAG IV INF INIT: CPT

## 2020-12-20 PROCEDURE — 96366 THER/PROPH/DIAG IV INF ADDON: CPT

## 2020-12-20 PROCEDURE — 85025 COMPLETE CBC W/AUTO DIFF WBC: CPT

## 2020-12-20 PROCEDURE — 87040 BLOOD CULTURE FOR BACTERIA: CPT

## 2020-12-20 PROCEDURE — 96361 HYDRATE IV INFUSION ADD-ON: CPT

## 2020-12-20 PROCEDURE — 86850 RBC ANTIBODY SCREEN: CPT

## 2020-12-20 PROCEDURE — 6360000002 HC RX W HCPCS: Performed by: EMERGENCY MEDICINE

## 2020-12-20 PROCEDURE — 36415 COLL VENOUS BLD VENIPUNCTURE: CPT

## 2020-12-20 PROCEDURE — 2140000000 HC CCU INTERMEDIATE R&B

## 2020-12-20 PROCEDURE — 86901 BLOOD TYPING SEROLOGIC RH(D): CPT

## 2020-12-20 PROCEDURE — 6360000004 HC RX CONTRAST MEDICATION: Performed by: EMERGENCY MEDICINE

## 2020-12-20 PROCEDURE — 87186 SC STD MICRODIL/AGAR DIL: CPT

## 2020-12-20 PROCEDURE — 6360000002 HC RX W HCPCS

## 2020-12-20 PROCEDURE — 71045 X-RAY EXAM CHEST 1 VIEW: CPT

## 2020-12-20 PROCEDURE — 86900 BLOOD TYPING SEROLOGIC ABO: CPT

## 2020-12-20 PROCEDURE — 2580000003 HC RX 258: Performed by: EMERGENCY MEDICINE

## 2020-12-20 PROCEDURE — 71275 CT ANGIOGRAPHY CHEST: CPT

## 2020-12-20 PROCEDURE — 85730 THROMBOPLASTIN TIME PARTIAL: CPT

## 2020-12-20 PROCEDURE — 84443 ASSAY THYROID STIM HORMONE: CPT

## 2020-12-20 PROCEDURE — 83605 ASSAY OF LACTIC ACID: CPT

## 2020-12-20 PROCEDURE — 82947 ASSAY GLUCOSE BLOOD QUANT: CPT

## 2020-12-20 PROCEDURE — 81001 URINALYSIS AUTO W/SCOPE: CPT

## 2020-12-20 PROCEDURE — 0202U NFCT DS 22 TRGT SARS-COV-2: CPT

## 2020-12-20 PROCEDURE — 93005 ELECTROCARDIOGRAM TRACING: CPT | Performed by: EMERGENCY MEDICINE

## 2020-12-20 PROCEDURE — 80061 LIPID PANEL: CPT

## 2020-12-20 PROCEDURE — 84100 ASSAY OF PHOSPHORUS: CPT

## 2020-12-20 PROCEDURE — 96374 THER/PROPH/DIAG INJ IV PUSH: CPT

## 2020-12-20 PROCEDURE — 96375 TX/PRO/DX INJ NEW DRUG ADDON: CPT

## 2020-12-20 PROCEDURE — 85610 PROTHROMBIN TIME: CPT

## 2020-12-20 PROCEDURE — 83880 ASSAY OF NATRIURETIC PEPTIDE: CPT

## 2020-12-20 PROCEDURE — 6360000004 HC RX CONTRAST MEDICATION: Performed by: FAMILY MEDICINE

## 2020-12-20 PROCEDURE — 83036 HEMOGLOBIN GLYCOSYLATED A1C: CPT

## 2020-12-20 PROCEDURE — 80053 COMPREHEN METABOLIC PANEL: CPT

## 2020-12-20 PROCEDURE — 84484 ASSAY OF TROPONIN QUANT: CPT

## 2020-12-20 PROCEDURE — 6370000000 HC RX 637 (ALT 250 FOR IP): Performed by: FAMILY MEDICINE

## 2020-12-20 PROCEDURE — 6360000002 HC RX W HCPCS: Performed by: FAMILY MEDICINE

## 2020-12-20 PROCEDURE — 99284 EMERGENCY DEPT VISIT MOD MDM: CPT

## 2020-12-20 PROCEDURE — 6370000000 HC RX 637 (ALT 250 FOR IP): Performed by: HOSPITALIST

## 2020-12-20 RX ORDER — ONDANSETRON 2 MG/ML
4 INJECTION INTRAMUSCULAR; INTRAVENOUS EVERY 6 HOURS PRN
Status: DISCONTINUED | OUTPATIENT
Start: 2020-12-20 | End: 2020-12-23 | Stop reason: HOSPADM

## 2020-12-20 RX ORDER — ASPIRIN 81 MG/1
81 TABLET, CHEWABLE ORAL DAILY
Status: DISCONTINUED | OUTPATIENT
Start: 2020-12-21 | End: 2020-12-23 | Stop reason: HOSPADM

## 2020-12-20 RX ORDER — HEPARIN SODIUM 1000 [USP'U]/ML
4000 INJECTION, SOLUTION INTRAVENOUS; SUBCUTANEOUS PRN
Status: DISCONTINUED | OUTPATIENT
Start: 2020-12-20 | End: 2020-12-23 | Stop reason: HOSPADM

## 2020-12-20 RX ORDER — SODIUM CHLORIDE 0.9 % (FLUSH) 0.9 %
10 SYRINGE (ML) INJECTION EVERY 12 HOURS SCHEDULED
Status: DISCONTINUED | OUTPATIENT
Start: 2020-12-20 | End: 2020-12-23 | Stop reason: HOSPADM

## 2020-12-20 RX ORDER — MAGNESIUM SULFATE 1 G/100ML
1 INJECTION INTRAVENOUS ONCE
Status: COMPLETED | OUTPATIENT
Start: 2020-12-20 | End: 2020-12-20

## 2020-12-20 RX ORDER — INSULIN GLARGINE 100 [IU]/ML
15 INJECTION, SOLUTION SUBCUTANEOUS NIGHTLY
Status: DISCONTINUED | OUTPATIENT
Start: 2020-12-20 | End: 2020-12-23 | Stop reason: HOSPADM

## 2020-12-20 RX ORDER — MORPHINE SULFATE 4 MG/ML
2 INJECTION, SOLUTION INTRAMUSCULAR; INTRAVENOUS ONCE
Status: COMPLETED | OUTPATIENT
Start: 2020-12-20 | End: 2020-12-20

## 2020-12-20 RX ORDER — HYDROCODONE BITARTRATE AND ACETAMINOPHEN 5; 325 MG/1; MG/1
1 TABLET ORAL NIGHTLY
Status: ON HOLD | COMMUNITY
End: 2020-12-23 | Stop reason: SDUPTHER

## 2020-12-20 RX ORDER — ATORVASTATIN CALCIUM 40 MG/1
40 TABLET, FILM COATED ORAL NIGHTLY
Status: DISCONTINUED | OUTPATIENT
Start: 2020-12-20 | End: 2020-12-23 | Stop reason: HOSPADM

## 2020-12-20 RX ORDER — DEXTROSE MONOHYDRATE 25 G/50ML
12.5 INJECTION, SOLUTION INTRAVENOUS PRN
Status: DISCONTINUED | OUTPATIENT
Start: 2020-12-20 | End: 2020-12-23 | Stop reason: HOSPADM

## 2020-12-20 RX ORDER — CLONAZEPAM 0.5 MG/1
0.5 TABLET ORAL 3 TIMES DAILY PRN
Status: ON HOLD | COMMUNITY
End: 2020-12-23 | Stop reason: SDUPTHER

## 2020-12-20 RX ORDER — LACTULOSE 10 G/15ML
20 SOLUTION ORAL DAILY
Status: DISCONTINUED | OUTPATIENT
Start: 2020-12-20 | End: 2020-12-23 | Stop reason: HOSPADM

## 2020-12-20 RX ORDER — SERTRALINE HYDROCHLORIDE 100 MG/1
100 TABLET, FILM COATED ORAL DAILY
Status: DISCONTINUED | OUTPATIENT
Start: 2020-12-20 | End: 2020-12-23 | Stop reason: HOSPADM

## 2020-12-20 RX ORDER — RISPERIDONE 0.25 MG/1
0.25 TABLET, FILM COATED ORAL DAILY
COMMUNITY

## 2020-12-20 RX ORDER — ACETAMINOPHEN 325 MG/1
650 TABLET ORAL EVERY 4 HOURS PRN
Status: DISCONTINUED | OUTPATIENT
Start: 2020-12-20 | End: 2020-12-23 | Stop reason: HOSPADM

## 2020-12-20 RX ORDER — METOPROLOL SUCCINATE 50 MG/1
50 TABLET, EXTENDED RELEASE ORAL DAILY
Status: DISCONTINUED | OUTPATIENT
Start: 2020-12-20 | End: 2020-12-23 | Stop reason: HOSPADM

## 2020-12-20 RX ORDER — ONDANSETRON 4 MG/1
4 TABLET, ORALLY DISINTEGRATING ORAL EVERY 8 HOURS PRN
Status: DISCONTINUED | OUTPATIENT
Start: 2020-12-20 | End: 2020-12-23 | Stop reason: HOSPADM

## 2020-12-20 RX ORDER — METFORMIN HYDROCHLORIDE 500 MG/1
1000 TABLET, EXTENDED RELEASE ORAL 2 TIMES DAILY
COMMUNITY

## 2020-12-20 RX ORDER — SODIUM CHLORIDE 0.9 % (FLUSH) 0.9 %
10 SYRINGE (ML) INJECTION PRN
Status: DISCONTINUED | OUTPATIENT
Start: 2020-12-20 | End: 2020-12-23 | Stop reason: HOSPADM

## 2020-12-20 RX ORDER — ONDANSETRON 2 MG/ML
INJECTION INTRAMUSCULAR; INTRAVENOUS
Status: COMPLETED
Start: 2020-12-20 | End: 2020-12-20

## 2020-12-20 RX ORDER — SIMETHICONE 80 MG
80 TABLET,CHEWABLE ORAL EVERY 6 HOURS PRN
COMMUNITY

## 2020-12-20 RX ORDER — ONDANSETRON 2 MG/ML
4 INJECTION INTRAMUSCULAR; INTRAVENOUS ONCE
Status: COMPLETED | OUTPATIENT
Start: 2020-12-20 | End: 2020-12-20

## 2020-12-20 RX ORDER — RISPERIDONE 1 MG/1
1.25 TABLET, FILM COATED ORAL DAILY
COMMUNITY

## 2020-12-20 RX ORDER — DOCUSATE SODIUM 100 MG/1
100 CAPSULE, LIQUID FILLED ORAL 2 TIMES DAILY
COMMUNITY

## 2020-12-20 RX ORDER — SERTRALINE HYDROCHLORIDE 100 MG/1
100 TABLET, FILM COATED ORAL DAILY
COMMUNITY

## 2020-12-20 RX ORDER — SODIUM CHLORIDE, SODIUM LACTATE, POTASSIUM CHLORIDE, CALCIUM CHLORIDE 600; 310; 30; 20 MG/100ML; MG/100ML; MG/100ML; MG/100ML
1000 INJECTION, SOLUTION INTRAVENOUS ONCE
Status: COMPLETED | OUTPATIENT
Start: 2020-12-20 | End: 2020-12-20

## 2020-12-20 RX ORDER — FENTANYL CITRATE 50 UG/ML
50 INJECTION, SOLUTION INTRAMUSCULAR; INTRAVENOUS
Status: DISCONTINUED | OUTPATIENT
Start: 2020-12-20 | End: 2020-12-20

## 2020-12-20 RX ORDER — BISACODYL 10 MG
10 SUPPOSITORY, RECTAL RECTAL DAILY PRN
Status: DISCONTINUED | OUTPATIENT
Start: 2020-12-20 | End: 2020-12-23 | Stop reason: HOSPADM

## 2020-12-20 RX ORDER — DEXTROSE MONOHYDRATE 50 MG/ML
100 INJECTION, SOLUTION INTRAVENOUS PRN
Status: DISCONTINUED | OUTPATIENT
Start: 2020-12-20 | End: 2020-12-23 | Stop reason: HOSPADM

## 2020-12-20 RX ORDER — LACTULOSE 10 G/15ML
20 SOLUTION ORAL DAILY
COMMUNITY

## 2020-12-20 RX ORDER — RISPERIDONE 0.25 MG/1
0.25 TABLET, FILM COATED ORAL 2 TIMES DAILY
Status: DISCONTINUED | OUTPATIENT
Start: 2020-12-20 | End: 2020-12-23 | Stop reason: HOSPADM

## 2020-12-20 RX ORDER — NITROGLYCERIN 0.4 MG/1
0.4 TABLET SUBLINGUAL EVERY 5 MIN PRN
Status: DISCONTINUED | OUTPATIENT
Start: 2020-12-20 | End: 2020-12-23 | Stop reason: HOSPADM

## 2020-12-20 RX ORDER — HEPARIN SODIUM 1000 [USP'U]/ML
2000 INJECTION, SOLUTION INTRAVENOUS; SUBCUTANEOUS PRN
Status: DISCONTINUED | OUTPATIENT
Start: 2020-12-20 | End: 2020-12-23 | Stop reason: HOSPADM

## 2020-12-20 RX ORDER — MORPHINE SULFATE 4 MG/ML
INJECTION, SOLUTION INTRAMUSCULAR; INTRAVENOUS
Status: COMPLETED
Start: 2020-12-20 | End: 2020-12-20

## 2020-12-20 RX ORDER — PANTOPRAZOLE SODIUM 40 MG/1
40 GRANULE, DELAYED RELEASE ORAL
Status: ON HOLD | COMMUNITY
End: 2021-03-01

## 2020-12-20 RX ORDER — NICOTINE POLACRILEX 4 MG
15 LOZENGE BUCCAL PRN
Status: DISCONTINUED | OUTPATIENT
Start: 2020-12-20 | End: 2020-12-23 | Stop reason: HOSPADM

## 2020-12-20 RX ORDER — DOCUSATE SODIUM 100 MG/1
100 CAPSULE, LIQUID FILLED ORAL 2 TIMES DAILY
Status: DISCONTINUED | OUTPATIENT
Start: 2020-12-20 | End: 2020-12-23 | Stop reason: HOSPADM

## 2020-12-20 RX ORDER — METOPROLOL SUCCINATE 50 MG/1
50 TABLET, EXTENDED RELEASE ORAL DAILY
COMMUNITY

## 2020-12-20 RX ORDER — NALOXONE HYDROCHLORIDE 0.4 MG/ML
0.4 INJECTION, SOLUTION INTRAMUSCULAR; INTRAVENOUS; SUBCUTANEOUS PRN
Status: DISCONTINUED | OUTPATIENT
Start: 2020-12-20 | End: 2020-12-23 | Stop reason: HOSPADM

## 2020-12-20 RX ORDER — HEPARIN SODIUM 1000 [USP'U]/ML
4000 INJECTION, SOLUTION INTRAVENOUS; SUBCUTANEOUS ONCE
Status: COMPLETED | OUTPATIENT
Start: 2020-12-20 | End: 2020-12-20

## 2020-12-20 RX ORDER — HEPARIN SODIUM 10000 [USP'U]/100ML
12 INJECTION, SOLUTION INTRAVENOUS CONTINUOUS
Status: DISCONTINUED | OUTPATIENT
Start: 2020-12-20 | End: 2020-12-23 | Stop reason: HOSPADM

## 2020-12-20 RX ADMIN — RISPERIDONE 0.25 MG: 0.25 TABLET ORAL at 21:28

## 2020-12-20 RX ADMIN — HEPARIN SODIUM AND DEXTROSE 12 UNITS/KG/HR: 10000; 5 INJECTION INTRAVENOUS at 04:41

## 2020-12-20 RX ADMIN — MORPHINE SULFATE 2 MG: 4 INJECTION, SOLUTION INTRAMUSCULAR; INTRAVENOUS at 06:23

## 2020-12-20 RX ADMIN — ONDANSETRON HYDROCHLORIDE 4 MG: 2 SOLUTION INTRAMUSCULAR; INTRAVENOUS at 06:24

## 2020-12-20 RX ADMIN — INSULIN LISPRO 8 UNITS: 100 INJECTION, SOLUTION INTRAVENOUS; SUBCUTANEOUS at 17:36

## 2020-12-20 RX ADMIN — ONDANSETRON 4 MG: 2 INJECTION INTRAMUSCULAR; INTRAVENOUS at 06:24

## 2020-12-20 RX ADMIN — ATORVASTATIN CALCIUM 40 MG: 40 TABLET, FILM COATED ORAL at 21:28

## 2020-12-20 RX ADMIN — CEFTRIAXONE 1 G: 1 INJECTION, POWDER, FOR SOLUTION INTRAMUSCULAR; INTRAVENOUS at 02:48

## 2020-12-20 RX ADMIN — MAGNESIUM SULFATE HEPTAHYDRATE 1 G: 1 INJECTION, SOLUTION INTRAVENOUS at 14:38

## 2020-12-20 RX ADMIN — HEPARIN SODIUM 2000 UNITS: 1000 INJECTION INTRAVENOUS; SUBCUTANEOUS at 21:29

## 2020-12-20 RX ADMIN — SODIUM CHLORIDE, POTASSIUM CHLORIDE, SODIUM LACTATE AND CALCIUM CHLORIDE 1000 ML: 600; 310; 30; 20 INJECTION, SOLUTION INTRAVENOUS at 02:48

## 2020-12-20 RX ADMIN — RISPERIDONE 0.25 MG: 0.25 TABLET ORAL at 14:37

## 2020-12-20 RX ADMIN — METOPROLOL SUCCINATE 50 MG: 50 TABLET, EXTENDED RELEASE ORAL at 14:37

## 2020-12-20 RX ADMIN — ACETAMINOPHEN 650 MG: 325 TABLET ORAL at 16:39

## 2020-12-20 RX ADMIN — Medication 15 UNITS: at 21:30

## 2020-12-20 RX ADMIN — INSULIN LISPRO 2 UNITS: 100 INJECTION, SOLUTION INTRAVENOUS; SUBCUTANEOUS at 14:38

## 2020-12-20 RX ADMIN — DOCUSATE SODIUM 100 MG: 100 CAPSULE ORAL at 14:37

## 2020-12-20 RX ADMIN — DOCUSATE SODIUM 100 MG: 100 CAPSULE ORAL at 21:28

## 2020-12-20 RX ADMIN — LACTULOSE 20 G: 20 SOLUTION ORAL at 14:38

## 2020-12-20 RX ADMIN — HEPARIN SODIUM 4000 UNITS: 1000 INJECTION INTRAVENOUS; SUBCUTANEOUS at 04:41

## 2020-12-20 RX ADMIN — HEPARIN SODIUM AND DEXTROSE 11 UNITS/KG/HR: 10000; 5 INJECTION INTRAVENOUS at 21:28

## 2020-12-20 RX ADMIN — IOPAMIDOL 90 ML: 755 INJECTION, SOLUTION INTRAVENOUS at 05:10

## 2020-12-20 RX ADMIN — SERTRALINE 100 MG: 100 TABLET, FILM COATED ORAL at 14:37

## 2020-12-20 ASSESSMENT — PAIN SCALES - GENERAL
PAINLEVEL_OUTOF10: 0
PAINLEVEL_OUTOF10: 5
PAINLEVEL_OUTOF10: 6

## 2020-12-20 NOTE — PROGRESS NOTES
Progress Note  Date:2020       Room:0718/718-02  Patient Name:Juliette Gusman     YOB: 1945     Age:75 y.o. Subjective    Subjective   Patient seen and examined this a.m. more alert, patient sister POA present at the bedside. Explained clinical course, likely need for cardiac catheterization as there is been elevated troponin. POA in agreement. Patient sister reports that patient has been deaf, trouble with vision since young after having severe fever. Patient and sister have on means of communication with sign language, evidently patient pointing to suprapubic as well as chest region for pain. Cumulative hospital course: Patient was admitted , brought to the emergency room via EMS due to hypotension, . Urinalysis with evidence of UTI continues on antibiotic therapy. Noted troponin elevation as well as left bundle branch block on EKG no prior, elevated lactic acid level. Patient admitted to PCU with cardiology consultation, plans for echocardiogram remains on heparin drip, informed by nursing that Jin Antunez stress has been canceled due to elevated troponin level. .  We will continue to monitor and correct electrolyte    Review of Systems   Unable to perform ROS: Acuity of condition        Objective         Vitals Last 24 Hours:  TEMPERATURE:  Temp  Av.1 °F (36.7 °C)  Min: 97.1 °F (36.2 °C)  Max: 99.4 °F (37.4 °C)  RESPIRATIONS RANGE: Resp  Av.5  Min: 16  Max: 18  PULSE OXIMETRY RANGE: SpO2  Av.8 %  Min: 91 %  Max: 96 %  PULSE RANGE: Pulse  Av.7  Min: 85  Max: 96  BLOOD PRESSURE RANGE: Systolic (80BUB), GGZ:334 , Min:104 , ZXN:966   ; Diastolic (02LPA), KVD:38, Min:65, Max:71    I/O (24Hr): Intake/Output Summary (Last 24 hours) at 2020  Last data filed at 2020 0553  Gross per 24 hour   Intake 120 ml   Output 1800 ml   Net -1680 ml     Physical Exam  Vitals signs reviewed.    Constitutional:       Comments: Fatigued appearing   HENT: Ears:      Comments: Patient deaf  Eyes:      Comments: Patient blind   Cardiovascular:      Rate and Rhythm: Normal rate. Rhythm irregular. Pulmonary:      Comments: Nasal cannula oxygen in place  Abdominal:      Tenderness: There is abdominal tenderness. There is no guarding or rebound. Comments: Suprapubic tenderness   Musculoskeletal:      Comments: Chest tenderness reproducible to palpation   Neurological:      Mental Status: She is alert. Mental status is at baseline. Labs/Imaging/Diagnostics    Labs:  CBC:  Recent Labs     12/20/20  0047 12/20/20  2326   WBC 7.8 6.6   RBC 4.03* 4.00*   HGB 10.5* 10.6*   HCT 33.1* 33.4*   MCV 82.1 83.5   RDW 14.1 14.3    126*     CHEMISTRIES:  Recent Labs     12/20/20  0047 12/20/20  0849 12/20/20  2326   *  --  139   K 3.7  --  3.7   CL 92*  --  96*   CO2 27  --  33*   BUN 33*  --  21   CREATININE 1.3*  --  1.1*   GLUCOSE 223*  --  97   PHOS  --  2.9  --    MG  --  1.1*  --      PT/INR:  Recent Labs     12/20/20  0047   PROTIME 13.9   INR 1.07     APTT:  Recent Labs     12/20/20  1737 12/20/20  2326 12/21/20  0520   APTT 41.8* 56.9* 46.7*     LIVER PROFILE:  Recent Labs     12/20/20  0047   AST 17   ALT 10   BILITOT 0.4   ALKPHOS 88       Imaging Last 24 Hours:  Xr Chest Portable    Result Date: 12/20/2020  Exam: XR CHEST PORTABLE - 12/19/2020 11:50 PM Indication: Fever, hypotension Comparison: None available. Findings: Cardiac silhouette is mildly enlarged. No pleural effusion, pneumothorax, or focal consolidative airspace process. Mild interstitial opacity throughout both lungs. Suspected mild atelectasis at both lung bases. No acute osseous finding. Impression: 1. No consolidation to suggest pneumonia. 2.  Mild interstitial opacity, correlate for mild edema. 3.  Mild cardiomegaly.  Signed by Dr Yann Stewart on 12/20/2020 6:29 AM    Cta Pulmonary W Contrast    Result Date: 12/20/2020  Exam: CT angiography with 3D MIP images chest with IV contrast - 12/20/2020 3:55 AM Indication: Hypoxia, elevated troponin Comparison: None available. DLP: 628 mGy cm. In order to have a CT radiation dose as low as reasonably achievable, Automated Exposure Control was utilized for adjustment of the mA and/or KV according to patient size. Findings: No pulmonary embolus. Main pulmonary trunk is nondilated. Thoracic aorta is normal caliber. Atherosclerotic calcification plaque throughout the thoracic aorta and coronary arteries. Heart is enlarged. No pericardial effusion. Mild atherosclerotic narrowing of the SMA and celiac artery origins. Central airways are clear. Bibasilar atelectasis and mild interlobular septal thickening in the lung bases. No consolidation or pleural effusion. No suspicious pulmonary nodule. No enlarged thoracic lymph nodes. Uniform mildly enlarged thyroid. Mild nonspecific LEFT perinephric inflammatory stranding (no comparison available at this finding is described on an outside CT from 10/10/2019). No acute osseous finding. Impression: 1. No evidence of pulmonary embolism. 2.  Mild cardiomegaly. 3.  Bibasilar atelectasis. 4.  Mild interlobular septal thickening, correlate for mild pulmonary edema. No consolidative process or pleural effusion. Findings in agreement with the emergent findings from the initial [facility] preliminary report.  Signed by Dr Thanh Lynn on 12/20/2020 8:31 AM    Assessment//Plan           Hospital Problems           Last Modified POA    * (Principal) LBBB (left bundle branch block) 12/20/2020 Yes    UTI (urinary tract infection) 12/20/2020 Yes    Elevated troponin 12/20/2020 Yes        Acute cystitis with trace hematuria  -Patient continues on IV antibiotics  -Encourage oral hydration  -Tylenol as needed for pain or temperature elevation greater than 100.4    Elevated troponin/EKG with left bundle branch block  -Cardiology has been consulted, appreciate recommendation  -Troponin trend 0.08--> 0.22--> 0.32 --> 0.49  -Heparin drip continues    Elevated lactic acid level  -Resolved    Hypomagnesemia  -Continue with supplementation    Chronic combined systolic and diastolic congestive heart failure  -Noted elevation of proBNP, monitor patient's I/os  -Echocardiogram ordered we will follow up    CKD stage III  -Appears stable, continue to renally dose medications, avoid nephrotoxic agent  -Daily metabolic profile      Type 2 diabetes-chronic condition, hold oral antihyperglycemics, continue sliding scale insulin, Lantus dosing, Accu-Cheks q. ACH S, C carb diet, hypoglycemic protocol    Essential hypertension-chronic condition, continue with metoprolol daily    Depression/anxiety  -Continue with Zoloft    Baseline dementia/deafness?  -Clarification had by patient's POA sister at the bedside, patient has been deaf, also trouble with vision since young after suffering severe fever  - Fall precautions, PT/OT  - Case management for discharge disposition assistance      EMR Dragon/Transcription disclaimer:   Much of this encounter note is an electronic transcription/translation of spoken language to printed text.  The electronic translation of spoken language may permit erroneous, or at times, nonsensical words or phrases to be inadvertently transcribed; although attempts have made to review the note for such errors, some may still exist.    Electronically signed by   Pedro Hunter   Internal Medicine Hospitalist  On 12/21/2020  At 9:23 AM

## 2020-12-20 NOTE — H&P
Use     Drug Use Status  Not Asked          Sexual Activity     Sexually Active  Not Asked           Family History:  Deferred     Allergies:  No Known Allergies    Current Facility-Administered Medications   Medication Dose Route Frequency Provider Last Rate Last Admin    heparin (porcine) injection 4,000 Units  4,000 Units Intravenous PRN Shahram Recio MD        heparin (porcine) injection 2,000 Units  2,000 Units Intravenous PRN Shahram Recio MD        heparin 25,000 units in dextrose 5% 250 mL infusion  12 Units/kg/hr Intravenous Continuous Shahram Recio MD   Stopped at 12/20/20 8165    naloxone [facility]) injection 0.4 mg  0.4 mg Intravenous PRN Papo Fung MD       [facility] [place] 12/21/2020] cefTRIAXone (ROCEPHIN) 1 g in sodium chloride (PF) 10 mL IV syringe  1 g Intravenous Q24H Papo Fung MD         Current Outpatient Medications   Medication Sig Dispense Refill    insulin lispro (HUMALOG) 100 UNIT/ML injection vial Inject 7 Units into the skin      sertraline (ZOLOFT) 100 MG tablet Take 100 mg by mouth daily      risperiDONE (RISPERDAL) 1 MG tablet Take 1 mg by mouth nightly      risperiDONE (RISPERDAL) 0.25 MG tablet Take 0.25 mg by mouth 2 times daily      pantoprazole sodium (PROTONIX) 40 MG PACK packet Take 40 mg by mouth every morning (before breakfast)      docusate sodium (COLACE) 100 MG capsule Take 100 mg by mouth 2 times daily      lactulose (CHRONULAC) 10 GM/15ML solution Take 20 g by mouth daily      clonazePAM (KLONOPIN) 0.5 MG tablet Take 0.5 mg by mouth 3 times daily as needed.  HYDROcodone-acetaminophen (NORCO) 5-325 MG per tablet Take 1 tablet by mouth nightly.       simethicone (MYLICON) 80 MG chewable tablet Take 80 mg by mouth every 6 hours as needed for Flatulence      metoprolol succinate (TOPROL XL) 50 MG extended release tablet Take 50 mg by mouth daily      metFORMIN (GLUCOPHAGE-XR) 500 MG extended release tablet Take 500 mg by mouth every 4 hours OBJECTIVE:    Vitals:    12/20/20 0303   BP: (!) 114/55   Pulse: 97   Resp: 27   Temp:    SpO2: 92%     BP (!) 114/55   Pulse 97   Temp 99.1 °F (37.3 °C) (Axillary)   Resp 27   Ht 5' 4\" (1.626 m)   Wt 150 lb (68 kg)   SpO2 92%   BMI 25.75 kg/m²       Intake/Output Summary (Last 24 hours) at 12/20/2020 0631  Last data filed at 12/20/2020 0440  Gross per 24 hour   Intake --   Output 500 ml   Net -500 ml     Physical Exam  Vitals signs reviewed. Constitutional:       General: She is not in acute distress. Appearance: Normal appearance. She is normal weight. She is not ill-appearing or toxic-appearing. HENT:      Head: Normocephalic and atraumatic. Nose: No congestion or rhinorrhea. Eyes:      General:         Right eye: No discharge. Left eye: No discharge. Neck:      Musculoskeletal: Neck supple. Comments: Trachea appears midline  Cardiovascular:      Rate and Rhythm: Normal rate and regular rhythm. Heart sounds: No murmur. No friction rub. No gallop. Pulmonary:      Effort: Pulmonary effort is normal. No respiratory distress. Breath sounds: No stridor. No wheezing, rhonchi or rales. Chest:      Chest wall: No tenderness. Abdominal:      General: Bowel sounds are normal.      Palpations: Abdomen is soft. Tenderness: There is no abdominal tenderness. There is no guarding or rebound. Skin:     General: Skin is warm. Comments: nondiaphoretic   Neurological:      Mental Status: She is alert. Motor: No tremor or seizure activity.    Psychiatric:      Comments: Unable to assess as per general medical condition       LABORATORY DATA:    CBC:   Recent Labs     12/20/20 0047   WBC 7.8   HGB 10.5*   HCT 33.1*        BMP:   Recent Labs     12/20/20 0047   *   K 3.7   CL 92*   CO2 27   BUN 33*   CREATININE 1.3*   CALCIUM 8.6*     Hepatic Profile:   Recent Labs     12/20/20 0047   AST 17   ALT 10   BILITOT 0.4   ALKPHOS 88     Coag Panel:   Recent Labs     12/20/20  0047   INR 1.07   PROTIME 13.9   APTT 27.9     Cardiac Enzymes:   Recent Labs     12/20/20  0047 12/20/20  0331   TROPONINI 0.08* 0.22*     Pro-BNP:   Recent Labs     12/20/20 0047   PROBNP 2,175*     A1C: No results for input(s): LABA1C in the last 72 hours. TSH: Invalid input(s): LABTSH    Lipid Panel: pending    Urinalysis:   Lab Results   Component Value Date    NITRU POSITIVE 12/20/2020    WBCUA 6-9 12/20/2020    BACTERIA 4+ 12/20/2020    RBCUA 2-4 12/20/2020    BLOODU TRACE 12/20/2020    SPECGRAV 1.010 12/20/2020    GLUCOSEU Negative 12/20/2020       EKG: as per EP sign out  LBBB of unknown chronicity withOUT any prior    IMAGING:  No results found. ASESSMENTS & PLANS:    UTI:  Follow CBC with Diff  Rocephin 1g IV Q24h    Elevated TnI and possibly new LBBB:  Tele  Admit to [facility]  Cardio consult stat  Trend TnI  Mag, Phos, TSH, Lipid Panel, HbA1c - will run as add ons to ED labs if able  CBC and BMP with Reflex for following AM  ASA as per protocol (324-325mg chewed STAT unless on 81ASA daily in which case 162mg chewed STAT if not yet given, THEN from following AM 81mg Daily.)  Statin as per protocol (High intensity Statin, if already on high intensity Stain of Simvasttain 80 continue it, if Lipitor 40+ then Lipitor 80 (if less than 40 just double so long as >=40), if Rosuvastatin 20mg+ then Rosuvastatin 40mg PO QHS (if less than 20 just double so long as >=20mg)  NG SL PRN CP  TTE in AM  Heparin GGT    Chronic Medical Problems:  Continue current Regimen as indicated    Supoportive and Prophylactic Txx:  DVT Prophylaxis: heparin GGT as per above  GI (PUD) Ppx: not indicated  PT consult for evalutation and Txx as indicated: not indicated until cardiac issue resolved  Clear liquid diet NO CAFFEINE  heparin (porcine), heparin (porcine), naloxone       Care time of >35 minutes  Pt seen/examined and admitted to inpatient status.   Inpatient status is used for patients with an expected LOS extending past two midnights due to medical therapy and or critical care needs, otherwise patients are placed to OBServation status. Signed:  Electronically signed by Maurilio Black MD on 12/20/20 at 07:42 AM CST.

## 2020-12-20 NOTE — CONSULTS
[facility]  Cardiology Consult      Requesting MD:  Veronica Campos MD   Admit Status:  Inpatient [101]       History obtained from:   [] Patient  [] Other (specify):     Patient:  Jesus Reid  [id number]     Chief Complaint:   Chief Complaint   Patient presents with    Hypotension     Per EMS [facility] called for patient being hypotensive         HPI:  Mrs. Jesus Reid is a pleasantly-demented quietly-non-verbal  [place] lady of 76 years. She was sent in for reported fever as per EMS. ED work up was revealing of UTI and also elevated TnI and a LBBB withOUT any prior documentation of same nor EKG showing same. The patient was unable to provide any information. She is being treated with Rocephin and is also on a Heparin GGT along with statin and ASA  Review of Systems:  Review of Systems   All other systems reviewed and are negative. Cardiac Specific Data:  Specialty Problems        Cardiology Problems    Essential hypertension        Chronic combined systolic and diastolic congestive heart failure (HCC)        * (Principal) LBBB (left bundle branch block)              Past Medical History:  Past Medical History:   Diagnosis Date    Anxiety     Arthritis     Deaf     Delusional disorder ([address]     Diabetes mellitus ([address]     Dizziness     Dysphagia     Gastro-esophageal reflux     Giddiness     Hypertension     Lack of coordination     Major depressive disorder     Nonspeaking deaf     Pyelonephritis     Schizoaffective disorder ([address]     Severe intellectual disabilities         Past Surgical History:  No past surgical history on file. Past Family History:  No family history on file.     Past Social History:  Social History     Socioeconomic History    Marital status: Single     Spouse name: Not on file    Number of children: Not on file    Years of education: Not on file    Highest education level: Not on file   Occupational History    Not on file   Social Needs    Financial resource strain: Not on file    Food insecurity     Worry: Not on file     Inability: Not on file    Transportation needs     Medical: Not on file     Non-medical: Not on file   Tobacco Use    Smoking status: Unknown If Ever Smoked    Tobacco comment: AMARILIS   Substance and Sexual Activity    Alcohol use: Not on file     Comment: AMARILIS    Drug use: Not on file    Sexual activity: Not on file   Lifestyle    Physical activity     Days per week: Not on file     Minutes per session: Not on file    Stress: Not on file   Relationships    Social connections     Talks on phone: Not on file     Gets together: Not on file     Attends Gnosticism service: Not on file     Active member of club or organization: Not on file     Attends meetings of clubs or organizations: Not on file     Relationship status: Not on file    Intimate partner violence     Fear of current or ex partner: Not on file     Emotionally abused: Not on file     Physically abused: Not on file     Forced sexual activity: Not on file   Other Topics Concern    Not on file   Social History Narrative    Not on file       Allergies:  No Known Allergies    Home Meds:  Prior to Admission medications    Medication Sig Start Date End Date Taking?  Authorizing Provider   insulin lispro (HUMALOG) 100 UNIT/ML injection vial Inject 7 Units into the skin   Yes Historical Provider, MD   sertraline (ZOLOFT) 100 MG tablet Take 100 mg by mouth daily   Yes Historical Provider, MD   risperiDONE (RISPERDAL) 1 MG tablet Take 1 mg by mouth nightly   Yes Historical Provider, MD   risperiDONE (RISPERDAL) 0.25 MG tablet Take 0.25 mg by mouth 2 times daily   Yes Historical Provider, MD   pantoprazole sodium (PROTONIX) 40 MG PACK packet Take 40 mg by mouth every morning (before breakfast)   Yes Historical Provider, MD   docusate sodium (COLACE) 100 MG capsule Take 100 mg by mouth 2 times daily   Yes Historical Provider, MD   lactulose ([facility]) 10 GM/15ML solution Take 20 g by mouth daily   Yes Historical Provider, MD   clonazePAM (KLONOPIN) 0.5 MG tablet Take 0.5 mg by mouth 3 times daily as needed. Yes Historical Provider, MD   HYDROcodone-acetaminophen (NORCO) 5-325 MG per tablet Take 1 tablet by mouth nightly. Yes Historical Provider, MD   simethicone (MYLICON) 80 MG chewable tablet Take 80 mg by mouth every 6 hours as needed for Flatulence   Yes Historical Provider, MD   metoprolol succinate (TOPROL XL) 50 MG extended release tablet Take 50 mg by mouth daily   Yes Historical Provider, MD   metFORMIN (GLUCOPHAGE-XR) 500 MG extended release tablet Take 500 mg by mouth every 4 hours   Yes Historical Provider, MD       Current Meds:   [START ON 12/21/2020] cefTRIAXone (ROCEPHIN) IV  1 g Intravenous Q24H    sodium chloride flush  10 mL Intravenous 2 times per day    [START ON 12/21/2020] aspirin  81 mg Oral Daily    atorvastatin  40 mg Oral Nightly    insulin glargine  15 Units Subcutaneous Nightly    insulin lispro  0-12 Units Subcutaneous TID WC    insulin lispro  0-6 Units Subcutaneous Nightly    lactulose  20 g Oral Daily    risperiDONE  0.25 mg Oral BID    sertraline  100 mg Oral Daily    docusate sodium  100 mg Oral BID    metoprolol succinate  50 mg Oral Daily    magnesium sulfate  1 g Intravenous Once       Current Infused Meds:   heparin (PORCINE) Infusion 10 Units/kg/hr (12/20/20 0836)    dextrose         Physical Exam:  Vitals:    12/20/20 0824   BP: 114/69   Pulse: 107   Resp: 23   Temp: 98.7 °F (37.1 °C)   SpO2: 94%       Intake/Output Summary (Last 24 hours) at 12/20/2020 1050  Last data filed at 12/20/2020 0440  Gross per 24 hour   Intake --   Output 500 ml   Net -500 ml     Estimated body mass index is 24.42 kg/m² as calculated from the following:    Height as of this encounter: 5' 4\" (1.626 m). Weight as of this encounter: 142 lb 4 oz (64.5 kg). Physical Exam  Vitals signs reviewed.    HENT:      Head: Normocephalic. Right Ear: Tympanic membrane normal.      Nose: Nose normal.   Eyes:      Pupils: Pupils are equal, round, and reactive to light. Neck:      Musculoskeletal: Normal range of motion and neck supple. Cardiovascular:      Rate and Rhythm: Normal rate and regular rhythm. Pulses: Normal pulses. Heart sounds: Normal heart sounds. Pulmonary:      Effort: Pulmonary effort is normal.      Breath sounds: Normal breath sounds. Abdominal:      General: Abdomen is flat. Skin:     General: Skin is warm. Neurological:      General: No focal deficit present. Mental Status: She is alert. Labs:  Recent Labs     12/20/20  0047   WBC 7.8   HGB 10.5*          Recent Labs     12/20/20 0047 12/20/20  0849   *  --    K 3.7  --    CL 92*  --    CO2 27  --    BUN 33*  --    CREATININE 1.3*  --    LABGLOM 40*  --    MG  --  1.1*   CALCIUM 8.6*  --    PHOS  --  2.9       CK, CKMB, Troponin: @LABRCNT (CKTOTAL:3, CKMB:3, TROPONINI:3)@    Last 3 BNP:  No results for input(s): BNP in the last 72 hours. IMAGING:  Xr Chest Portable    Result Date: 12/20/2020  Exam: XR CHEST PORTABLE - 12/19/2020 11:50 PM Indication: Fever, hypotension Comparison: None available. Findings: Cardiac silhouette is mildly enlarged. No pleural effusion, pneumothorax, or focal consolidative airspace process. Mild interstitial opacity throughout both lungs. Suspected mild atelectasis at both lung bases. No acute osseous finding. Impression: 1. No consolidation to suggest pneumonia. 2.  Mild interstitial opacity, correlate for mild edema. 3.  Mild cardiomegaly. Signed by Dr Jo Block on 12/20/2020 6:29 AM    Cta Pulmonary W Contrast    Result Date: 12/20/2020  Exam: CT angiography with 3D MIP images chest with IV contrast - 12/20/2020 3:55 AM Indication: Hypoxia, elevated troponin Comparison: None available. DLP: 628 mGy cm.  In order to have a CT radiation dose as low as reasonably

## 2020-12-20 NOTE — ED NOTES
[address] [facility] called for patients medication list and history.  States they will fax it     Aminta Marques RN  12/20/20 4964

## 2020-12-20 NOTE — ED PROVIDER NOTES
[facility] EMERGENCY DEPT  EMERGENCY DEPARTMENT ENCOUNTER      Pt Name: Humaira Dow  MRN: [id number]  [place] 1945  Date of evaluation: 12/20/2020  Provider: Clari Johnson MD    [address] [place] [address]       Chief Complaint   Patient presents with    Hypotension     Per EMS [facility] called for patient being hypotensive         HISTORY OF PRESENT ILLNESS   (Location/Symptom, Timing/Onset,Context/Setting, Quality, Duration, Modifying Factors, Severity)  Note limiting factors. Humaira Dow is a 76 y.o. female who presents to the emergency department after she was found to be hypotensive at the nursing home. Patient is deaf and nonverbal so additional history is limited. EMS noted patient be febrile on arrival, but has had normal temperatures at the nursing home and since arrival here. Per nursing home, patient did not have any other symptoms but does seem more somnolent and with more generalized weakness than normal and is normally able to be up and walking around. HPI    NursingNotes were reviewed. REVIEW OF SYSTEMS    (2-9 systems for level 4, 10 or more for level 5)     Review of Systems   Unable to perform ROS: Patient nonverbal       A complete review of systems was performed and is negative except as noted above in the HPI. PAST MEDICAL HISTORY     Past Medical History:   Diagnosis Date    Anxiety     Arthritis     Deaf     Delusional disorder ([address]     Diabetes mellitus ([address]     Dizziness     Dysphagia     Gastro-esophageal reflux     Giddiness     Hypertension     Lack of coordination     Major depressive disorder     Nonspeaking deaf     Pyelonephritis     Schizoaffective disorder ([address]     Severe intellectual disabilities          SURGICAL HISTORY     No past surgical history on file. CURRENT MEDICATIONS       Previous Medications    CLONAZEPAM (KLONOPIN) 0.5 MG TABLET    Take 0.5 mg by mouth 3 times daily as needed.     DOCUSATE SODIUM (COLACE) 100 MG CAPSULE    Take 100 mg by mouth 2 times daily    HYDROCODONE-ACETAMINOPHEN (NORCO) 5-325 MG PER TABLET    Take 1 tablet by mouth nightly. INSULIN LISPRO (HUMALOG) 100 UNIT/ML INJECTION VIAL    Inject 7 Units into the skin    LACTULOSE (CHRONULAC) 10 GM/15ML SOLUTION    Take 20 g by mouth daily    METFORMIN (GLUCOPHAGE-XR) 500 MG EXTENDED RELEASE TABLET    Take 500 mg by mouth every 4 hours    METOPROLOL SUCCINATE (TOPROL XL) 50 MG EXTENDED RELEASE TABLET    Take 50 mg by mouth daily    PANTOPRAZOLE SODIUM (PROTONIX) 40 MG PACK PACKET    Take 40 mg by mouth every morning (before breakfast)    RISPERIDONE (RISPERDAL) 0.25 MG TABLET    Take 0.25 mg by mouth 2 times daily    RISPERIDONE (RISPERDAL) 1 MG TABLET    Take 1 mg by mouth nightly    SERTRALINE (ZOLOFT) 100 MG TABLET    Take 100 mg by mouth daily    SIMETHICONE (MYLICON) 80 MG CHEWABLE TABLET    Take 80 mg by mouth every 6 hours as needed for Flatulence       ALLERGIES     Patient has no known allergies. FAMILY HISTORY     No family history on file.        SOCIAL HISTORY       Social History     Socioeconomic History    Marital status: Single     Spouse name: Not on file    Number of children: Not on file    Years of education: Not on file    Highest education level: Not on file   Occupational History    Not on file   Social Needs    Financial resource strain: Not on file    Food insecurity     Worry: Not on file     Inability: Not on file    Transportation needs     Medical: Not on file     Non-medical: Not on file   Tobacco Use    Smoking status: Unknown If Ever Smoked    Tobacco comment: AMARILIS   Substance and Sexual Activity    Alcohol use: Not on file     Comment: AMARILIS    Drug use: Not on file    Sexual activity: Not on file   Lifestyle    Physical activity     Days per week: Not on file     Minutes per session: Not on file    Stress: Not on file   Relationships    Social connections     Talks on phone: Not on file     Gets together: Not on file Attends [employer] service: Not on file     Active member of club or organization: Not on file     Attends meetings of clubs or organizations: Not on file     Relationship status: Not on file    Intimate partner violence     Fear of current or ex partner: Not on file     Emotionally abused: Not on file     Physically abused: Not on file     Forced sexual activity: Not on file   Other Topics Concern    Not on file   Social History Narrative    Not on file       SCREENINGS             PHYSICAL EXAM    (up to 7 for level 4, 8 or more for level 5)     ED Triage Vitals [12/20/20 0030]   BP Temp Temp Source Pulse Resp SpO2 Height Weight   (!) 127/59 98.9 °F (37.2 °C) Oral 105 27 (!) 87 % 5' 4\" (1.626 m) 150 lb (68 kg)       Physical Exam  Vitals signs and nursing note reviewed. Constitutional:       General: She is not in acute distress. Appearance: She is well-developed. She is not toxic-appearing or diaphoretic. HENT:      Head: Normocephalic and atraumatic. Eyes:      General: No scleral icterus. Right eye: No discharge. Left eye: No discharge. Pupils: Pupils are equal, round, and reactive to light. Neck:      Musculoskeletal: Normal range of motion. Cardiovascular:      Rate and Rhythm: Normal rate and regular rhythm. Pulmonary:      Effort: Pulmonary effort is normal. No respiratory distress. Breath sounds: No stridor. Abdominal:      General: There is no distension. Musculoskeletal: Normal range of motion. General: No deformity. Skin:     General: Skin is warm and dry. Neurological:      General: No focal deficit present. Mental Status: She is alert. GCS: GCS eye subscore is 4. GCS verbal subscore is 2. GCS motor subscore is 6. Cranial Nerves: No cranial nerve deficit. Motor: No abnormal muscle tone. Comments: Patient is easily arousable. Moans but is nonverbal otherwise.   Does track to sound   Psychiatric:         Behavior: Behavior normal.         Thought Content:  Thought content normal.         Judgment: Judgment normal.         DIAGNOSTIC RESULTS     EKG: All EKG's are interpreted by the Emergency Department Physician who either signs or Co-signs this chart in the absence of a cardiologist.      RADIOLOGY:   Non-plain film images such as CT, Ultrasound and MRI are read by the radiologist. [place] images are visualized and preliminarily interpreted by the emergency physician with the below findings:        Interpretation per the Radiologist below, if available at the time of this note:    XR CHEST PORTABLE    (Results Pending)   CTA PULMONARY W CONTRAST    (Results Pending)         ED BEDSIDE ULTRASOUND:   Performed by ED Physician - none    LABS:  Labs Reviewed   COMPREHENSIVE METABOLIC PANEL W/ REFLEX TO MG FOR LOW K - Abnormal; Notable for the following components:       Result Value    Sodium 134 (*)     Chloride 92 (*)     Glucose 223 (*)     BUN 33 (*)     CREATININE 1.3 (*)     GFR Non- 40 (*)     GFR  48 (*)     Calcium 8.6 (*)     All other components within normal limits   TROPONIN - Abnormal; Notable for the following components:    Troponin 0.08 (*)     All other components within normal limits   BRAIN NATRIURETIC PEPTIDE - Abnormal; Notable for the following components:    Pro-BNP 2,175 (*)     All other components within normal limits   URINE RT REFLEX TO CULTURE - Abnormal; Notable for the following components:    Blood, Urine TRACE (*)     Protein, UA 30 (*)     Nitrite, Urine POSITIVE (*)     Leukocyte Esterase, Urine SMALL (*)     All other components within normal limits   LACTIC ACID, PLASMA - Abnormal; Notable for the following components:    Lactic Acid 2.5 (*)     All other components within normal limits    Narrative:     CALL  [facility] tel. ,  Chemistry results called to and read back by María Mendez RN in ED, 12/20/2020 01:57,  by [address] [place] [address] - Abnormal; Notable for the following components:    Bacteria, UA 4+ (*)     All other components within normal limits   TROPONIN - Abnormal; Notable for the following components:    Troponin 0.22 (*)     All other components within normal limits    Narrative:     [address] tel[address] ,  Chemistry results called to and read back by Vandana Marcelo RN in ED, 12/20/2020 04:18,  by [facility]   CBC WITH AUTO DIFFERENTIAL - Abnormal; Notable for the following components:    RBC 4.03 (*)     Hemoglobin 10.5 (*)     Hematocrit 33.1 (*)     MCH 26.1 (*)     MCHC 31.7 (*)     Neutrophils % 86.4 (*)     Lymphocytes % 4.0 (*)     Lymphocytes Absolute 0.3 (*)     All other components within normal limits   RESPIRATORY PANEL, MOLECULAR, WITH COVID-19   CULTURE, BLOOD 1   CULTURE, BLOOD 2   APTT   PROTIME-INR   APTT   APTT   APTT   TROPONIN   TSH WITH REFLEX TO FT4   HEMOGLOBIN A1C   LIPID PANEL   MAGNESIUM   PHOSPHORUS   TYPE AND SCREEN       All other labs were within normal range or not returned as of this dictation.     Medications   heparin (porcine) injection 4,000 Units (has no administration in time range)   heparin (porcine) injection 2,000 Units (has no administration in time range)   heparin 25,000 units in dextrose 5% 250 mL infusion (12 Units/kg/hr × 68 kg Intravenous New Bag 12/20/20 0441)   naloxone (NARCAN) injection 0.4 mg (has no administration in time range)   cefTRIAXone (ROCEPHIN) 1 g in sterile water 10 mL IV syringe (has no administration in time range)   lactated ringers infusion 1,000 mL (0 mLs Intravenous Stopped 12/20/20 0440)   cefTRIAXone (ROCEPHIN) 1 g in sterile water 10 mL IV syringe (0 g Intravenous Stopped 12/20/20 0322)   lactated ringers infusion 1,000 mL (0 mLs Intravenous Stopped 12/20/20 0440)   heparin (porcine) injection 4,000 Units (4,000 Units Intravenous Given 12/20/20 0441)   iopamidol (ISOVUE-370) 76 % injection 90 mL (90 mLs Intravenous Given 12/20/20 0510)   morphine injection 2 mg (2 mg Intravenous Given transfer of care to the medicine service. CONSULTS:  IP CONSULT TO CARDIOLOGY  IP CONSULT TO CARDIOLOGY    PROCEDURES:  Unless otherwise notedbelow, none     Procedures  CRITICAL CARE TIME   Total Critical Care time was 60 minutes, excluding separately reportable procedures. There was a high probability of clinically significant/life threatening deterioration in the patient's condition which required my urgent intervention. FINAL IMPRESSION     1. Hypotension, unspecified hypotension type    2. Deaf, nonspeaking    3. Acute cystitis without hematuria    4. Elevated troponin    5. Hypoxia    6. Sepsis due to urinary tract infection ([facility]    7. Left bundle branch block          DISPOSITION/PLAN   DISPOSITION Decision To Admit 12/20/2020 03:21:41 AM      PATIENT REFERRED TO:  No follow-up provider specified.     DISCHARGE MEDICATIONS:  New Prescriptions    No medications on file          (Please note that portions of this note were completed with a voice recognition program.  Efforts were made to edit the dictations butoccasionally words are mis-transcribed.)    Sandie White MD (electronically signed)  AttendingEmergency Physician          Sandie Live MD  12/20/20 4762

## 2020-12-20 NOTE — LETTER
[facility]  Cardiac Rehab Department  [address] [place] [address] [address]  [phone]  Toll Free [phone]          December 28, 2020    Dear Benedict Hunter,    Please find this informational packet that has been sent to you on heart disease and the guidelines you are to follow concerning your present cardiac condition and immediate recovery. Due to your recent cardiac diagnosis and coronary stent placement you now qualify for participation in a Phase II Outpatient Cardiac Rehab Program.  This elective service has been shown to significantly reduce cardiac mortality by 26-31% and increase longevity by as much as 5 years among patients such as yourself! A brochure has been included in this mailing for the purpose of providing you with a brief overview of program components. The [address] [facility] Program is accepting patients in accordance with all current regulatory COVID-19 guidelines and practices. Should you choose you may take advantage of this opportunity by contacting [facility] at [phone] to set up an orientation and assessment appointment. Since you live beyond a 25 mile radius of [facility], you may opt to contact [facility] at [phone] to check on program availability and the opportunity to enroll at that site. Furthermore, feel free to reach out to us with any questions or concerns and our staff will be more than pleased to assist you. Thank you. To the betterment of your health,        [facility] Cardiac Rehab Staff    MARIAH Espinosa, BS, MA  Registered Nurse  Registered Nurse   Exercise Physiologist

## 2020-12-21 LAB
ANION GAP SERPL CALCULATED.3IONS-SCNC: 10 MMOL/L (ref 7–19)
APTT: 46.7 SEC (ref 26–36.2)
BUN BLDV-MCNC: 21 MG/DL (ref 8–23)
CALCIUM SERPL-MCNC: 8.8 MG/DL (ref 8.8–10.2)
CHLORIDE BLD-SCNC: 96 MMOL/L (ref 98–111)
CO2: 33 MMOL/L (ref 22–29)
CREAT SERPL-MCNC: 1.1 MG/DL (ref 0.5–0.9)
EKG P AXIS: 54 DEGREES
EKG P AXIS: 64 DEGREES
EKG P-R INTERVAL: 132 MS
EKG P-R INTERVAL: 136 MS
EKG Q-T INTERVAL: 354 MS
EKG Q-T INTERVAL: 392 MS
EKG QRS DURATION: 122 MS
EKG QRS DURATION: 124 MS
EKG QTC CALCULATION (BAZETT): 429 MS
EKG QTC CALCULATION (BAZETT): 432 MS
EKG T AXIS: 124 DEGREES
EKG T AXIS: 156 DEGREES
GFR AFRICAN AMERICAN: 59
GFR NON-AFRICAN AMERICAN: 48
GLUCOSE BLD-MCNC: 184 MG/DL (ref 70–99)
GLUCOSE BLD-MCNC: 206 MG/DL (ref 70–99)
GLUCOSE BLD-MCNC: 219 MG/DL (ref 70–99)
GLUCOSE BLD-MCNC: 97 MG/DL (ref 74–109)
LV EF: 58 %
LVEF MODALITY: NORMAL
PERFORMED ON: ABNORMAL
POC ACT LR: 243 SEC
POC ACT LR: 259 SEC
POC ACT LR: 271 SEC
POC ACT LR: 350 SEC
POTASSIUM REFLEX MAGNESIUM: 3.7 MMOL/L (ref 3.5–5)
SODIUM BLD-SCNC: 139 MMOL/L (ref 136–145)

## 2020-12-21 PROCEDURE — 99222 1ST HOSP IP/OBS MODERATE 55: CPT | Performed by: THORACIC SURGERY (CARDIOTHORACIC VASCULAR SURGERY)

## 2020-12-21 PROCEDURE — 2140000000 HC CCU INTERMEDIATE R&B

## 2020-12-21 PROCEDURE — 93458 L HRT ARTERY/VENTRICLE ANGIO: CPT

## 2020-12-21 PROCEDURE — 92928 PRQ TCAT PLMT NTRAC ST 1 LES: CPT

## 2020-12-21 PROCEDURE — 99153 MOD SED SAME PHYS/QHP EA: CPT

## 2020-12-21 PROCEDURE — C1725 CATH, TRANSLUMIN NON-LASER: HCPCS

## 2020-12-21 PROCEDURE — 2500000003 HC RX 250 WO HCPCS

## 2020-12-21 PROCEDURE — 99152 MOD SED SAME PHYS/QHP 5/>YRS: CPT

## 2020-12-21 PROCEDURE — 6360000002 HC RX W HCPCS

## 2020-12-21 PROCEDURE — 93010 ELECTROCARDIOGRAM REPORT: CPT | Performed by: INTERNAL MEDICINE

## 2020-12-21 PROCEDURE — C8929 TTE W OR WO FOL WCON,DOPPLER: HCPCS

## 2020-12-21 PROCEDURE — 51701 INSERT BLADDER CATHETER: CPT

## 2020-12-21 PROCEDURE — C1894 INTRO/SHEATH, NON-LASER: HCPCS

## 2020-12-21 PROCEDURE — 6360000004 HC RX CONTRAST MEDICATION: Performed by: HOSPITALIST

## 2020-12-21 PROCEDURE — 2709999900 HC NON-CHARGEABLE SUPPLY

## 2020-12-21 PROCEDURE — 36415 COLL VENOUS BLD VENIPUNCTURE: CPT

## 2020-12-21 PROCEDURE — B2111ZZ FLUOROSCOPY OF MULTIPLE CORONARY ARTERIES USING LOW OSMOLAR CONTRAST: ICD-10-PCS | Performed by: INTERNAL MEDICINE

## 2020-12-21 PROCEDURE — 6370000000 HC RX 637 (ALT 250 FOR IP): Performed by: HOSPITALIST

## 2020-12-21 PROCEDURE — 2580000003 HC RX 258: Performed by: INTERNAL MEDICINE

## 2020-12-21 PROCEDURE — 85347 COAGULATION TIME ACTIVATED: CPT

## 2020-12-21 PROCEDURE — 93005 ELECTROCARDIOGRAM TRACING: CPT | Performed by: HOSPITALIST

## 2020-12-21 PROCEDURE — 027036Z DILATION OF CORONARY ARTERY, ONE ARTERY WITH THREE DRUG-ELUTING INTRALUMINAL DEVICES, PERCUTANEOUS APPROACH: ICD-10-PCS | Performed by: INTERNAL MEDICINE

## 2020-12-21 PROCEDURE — 6370000000 HC RX 637 (ALT 250 FOR IP)

## 2020-12-21 PROCEDURE — 2580000003 HC RX 258: Performed by: HOSPITALIST

## 2020-12-21 PROCEDURE — 2500000003 HC RX 250 WO HCPCS: Performed by: FAMILY MEDICINE

## 2020-12-21 PROCEDURE — C1887 CATHETER, GUIDING: HCPCS

## 2020-12-21 PROCEDURE — 85730 THROMBOPLASTIN TIME PARTIAL: CPT

## 2020-12-21 PROCEDURE — 85025 COMPLETE CBC W/AUTO DIFF WBC: CPT

## 2020-12-21 PROCEDURE — 80048 BASIC METABOLIC PNL TOTAL CA: CPT

## 2020-12-21 PROCEDURE — 6360000002 HC RX W HCPCS: Performed by: HOSPITALIST

## 2020-12-21 PROCEDURE — 6360000002 HC RX W HCPCS: Performed by: EMERGENCY MEDICINE

## 2020-12-21 PROCEDURE — 6370000000 HC RX 637 (ALT 250 FOR IP): Performed by: FAMILY MEDICINE

## 2020-12-21 PROCEDURE — C1874 STENT, COATED/COV W/DEL SYS: HCPCS

## 2020-12-21 PROCEDURE — 82947 ASSAY GLUCOSE BLOOD QUANT: CPT

## 2020-12-21 PROCEDURE — C1769 GUIDE WIRE: HCPCS

## 2020-12-21 PROCEDURE — 4A023N7 MEASUREMENT OF CARDIAC SAMPLING AND PRESSURE, LEFT HEART, PERCUTANEOUS APPROACH: ICD-10-PCS | Performed by: INTERNAL MEDICINE

## 2020-12-21 PROCEDURE — 51798 US URINE CAPACITY MEASURE: CPT

## 2020-12-21 RX ORDER — SODIUM CHLORIDE 9 MG/ML
INJECTION, SOLUTION INTRAVENOUS CONTINUOUS
Status: DISCONTINUED | OUTPATIENT
Start: 2020-12-21 | End: 2020-12-23 | Stop reason: HOSPADM

## 2020-12-21 RX ORDER — CLOPIDOGREL BISULFATE 75 MG/1
75 TABLET ORAL DAILY
Status: DISCONTINUED | OUTPATIENT
Start: 2020-12-22 | End: 2020-12-23 | Stop reason: HOSPADM

## 2020-12-21 RX ADMIN — SODIUM CHLORIDE: 9 INJECTION, SOLUTION INTRAVENOUS at 15:57

## 2020-12-21 RX ADMIN — SERTRALINE 100 MG: 100 TABLET, FILM COATED ORAL at 10:29

## 2020-12-21 RX ADMIN — DOCUSATE SODIUM 100 MG: 100 CAPSULE ORAL at 21:30

## 2020-12-21 RX ADMIN — IOPAMIDOL 170 ML: 612 INJECTION, SOLUTION INTRAVENOUS at 14:18

## 2020-12-21 RX ADMIN — MICONAZOLE NITRATE: 2 POWDER TOPICAL at 21:38

## 2020-12-21 RX ADMIN — RISPERIDONE 0.25 MG: 0.25 TABLET ORAL at 10:29

## 2020-12-21 RX ADMIN — DOCUSATE SODIUM 100 MG: 100 CAPSULE ORAL at 10:29

## 2020-12-21 RX ADMIN — SODIUM CHLORIDE, PRESERVATIVE FREE 10 ML: 5 INJECTION INTRAVENOUS at 10:30

## 2020-12-21 RX ADMIN — METOPROLOL SUCCINATE 50 MG: 50 TABLET, EXTENDED RELEASE ORAL at 10:29

## 2020-12-21 RX ADMIN — RISPERIDONE 0.25 MG: 0.25 TABLET ORAL at 21:30

## 2020-12-21 RX ADMIN — INSULIN LISPRO 2 UNITS: 100 INJECTION, SOLUTION INTRAVENOUS; SUBCUTANEOUS at 21:28

## 2020-12-21 RX ADMIN — ASPIRIN 81 MG: 81 TABLET, CHEWABLE ORAL at 10:29

## 2020-12-21 RX ADMIN — SODIUM CHLORIDE, PRESERVATIVE FREE 1 G: 5 INJECTION INTRAVENOUS at 01:12

## 2020-12-21 RX ADMIN — PERFLUTREN 1.65 MG: 6.52 INJECTION, SUSPENSION INTRAVENOUS at 07:45

## 2020-12-21 RX ADMIN — SODIUM CHLORIDE, PRESERVATIVE FREE 10 ML: 5 INJECTION INTRAVENOUS at 01:12

## 2020-12-21 RX ADMIN — ATORVASTATIN CALCIUM 40 MG: 40 TABLET, FILM COATED ORAL at 21:30

## 2020-12-21 RX ADMIN — HEPARIN SODIUM 2000 UNITS: 1000 INJECTION INTRAVENOUS; SUBCUTANEOUS at 05:59

## 2020-12-21 RX ADMIN — INSULIN LISPRO 4 UNITS: 100 INJECTION, SOLUTION INTRAVENOUS; SUBCUTANEOUS at 17:19

## 2020-12-21 RX ADMIN — LACTULOSE 20 G: 20 SOLUTION ORAL at 10:30

## 2020-12-21 RX ADMIN — SODIUM CHLORIDE, PRESERVATIVE FREE 10 ML: 5 INJECTION INTRAVENOUS at 21:30

## 2020-12-21 RX ADMIN — Medication 15 UNITS: at 21:28

## 2020-12-21 RX ADMIN — SODIUM CHLORIDE: 9 INJECTION, SOLUTION INTRAVENOUS at 11:15

## 2020-12-21 ASSESSMENT — PAIN SCALES - GENERAL
PAINLEVEL_OUTOF10: 0

## 2020-12-21 ASSESSMENT — PAIN SCALES - WONG BAKER
WONGBAKER_NUMERICALRESPONSE: 0

## 2020-12-21 NOTE — PROGRESS NOTES
Spoke with Nellie Malave at [facility] in [place] to obtain information on patient due to her nonverbal status. Per Nellie Malave patient is independent with ambulation, feeds self, dresses self, and is continent of both bowel and bladder. Patient is on a carb control, mechanical soft diet with ground meats and thin liquids. Patient takes pills whole in applesauce/yogurt/pudding. As long as patient has her glasses on she can see and has her own form of sign language. Dr. Amanda Valdes made aware of patient's diet at nursing home.

## 2020-12-21 NOTE — PROGRESS NOTES
Dr. Francesca Odell and Dr. Clara Andre notified of patient's troponin increasing to 0.49. No new orders, to remain on heparin drip.

## 2020-12-21 NOTE — PROGRESS NOTES
4 Eyes Skin Assessment    Stefanie Kaur is being assessed upon: Admission    I agree that Loreta Srivastava, along with Krish Jenkins (either 2 RN's or 1 LPN and 1 RN) have performed a thorough Head to Toe Skin Assessment on the patient. ALL assessment sites listed below have been assessed. Areas assessed by both nurses:     [x]   Head, Face, and Ears   [x]   Shoulders, Back, and Chest  [x]   Arms, Elbows, and Hands   [x]   Coccyx, Sacrum, and Ischium  [x]   Legs, Feet, and Heels    Does the Patient have Skin Breakdown? redness noted to abdominal fold    [place] Prevention initiated: no  Wound Care Orders initiated: No    WOC nurse consulted for Pressure Injury (Stage 3,4, Unstageable, DTI, NWPT, and Complex wounds) and New or Established Ostomies: No        Primary Nurse eSignature:  Eunice Quijano RN on 12/20/2020 at 7:47 PM      Co-Signer eSignature: Electronically signed by Sasha Martinez RN on 12/20/20 at 7:59 PM CST

## 2020-12-21 NOTE — PROGRESS NOTES
Pt is NPO for a procedure. Will return at a later date to complete evaluation.               Electronically Signed By:  Meagan Humphrey M.S. [id number] [place] [address]  12/21/2020,10:07 AM.

## 2020-12-21 NOTE — PROGRESS NOTES
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  Recent A1c was 11.1%.  Patient was lost to follow-up for the last year but wants to retake control of her health.  We will continue Basaglar 70 units nightly.  We will stop metformin ER due to recent recall and start metformin  mg twice daily with plan to increase to 1000 mg twice daily as tolerated.  Start Trulicity 0.75 mg/week.  We will plan on increasing to 1.5 mg/week as tolerated.  Discussed potential side effects/ risks of Rx.   I would also like to start mealtime insulin, but we need to collect more data before we can do this safely.  Recommended that patient check daily fasting and mealtime sugars and bring log to next appointment in 2 weeks.  At that time, we will plan on initiating mealtime Humalog.  We reviewed goal blood sugars.   Dr. Jim Saini and Dr. Marinelli Overcast aware of elevated troponin.

## 2020-12-21 NOTE — PROGRESS NOTES
Patient has not had any urine output since 0530 this am. Notified Dr. Carlos Abreu and orders received to bladder scan and in/out cath if needed. 500 mL output from in & out cath. Splint placed to R wrist to keep patient from bending wrist. 3+ radial pulse to RUE. Cap refill<3 sec, skin warm and dry, no bleeding or hematoma. 2 ml air removed from band. Will continue to deflate.  Electronically signed by Catia Knapp RN on 12/21/2020 at 5:30 PM

## 2020-12-21 NOTE — CONSULTS
Consultation History & Physical    Date of Admission:  12/20/2020 12:29 AM  Date of Consultation:  12/21/2020    Surgeon:      Cardiologist:     PCP:  No primary care provider on file. Other:       Reason for Consultation:  Eval for CABG    History of Present Illness:    . Yifan Gibbons is a 76 y.o. female who was recently admitted with a non-STEMI. Over the past 24 to 48 hours she has had increasing troponins and while a stress test was ordered due to her worsening evidence of ischemia cardiac catheterization was performed. In discussing the images and reviewing them with her cardiologist it appears that her culprit lesion is a high-grade near chronic occlusion of her mid right coronary artery. In addition she has high-grade proximal disease and a large circumflex, diagonal that comes off of her LAD very proximal as well as what appears to be a high-grade LAD lesion. Her distal targets appear to be reasonable. An echocardiogram was performed last night and while the results are not available at this time there is what appears to be good biventricular function and no valvular disease. She has not had any other previous episodes according to her family. She is a diabetic and has a history of high blood pressure. Past Medical History:    Past Medical History:   Diagnosis Date    Anxiety     Arthritis     Deaf     Delusional disorder [facility] [address]     Diabetes mellitus ([address]     Dizziness     Dysphagia     Gastro-esophageal reflux     Giddiness     Hypertension     Lack of coordination     Major depressive disorder     Nonspeaking deaf     Pyelonephritis     Schizoaffective disorder ([facility] [address]     Severe intellectual disabilities        Past Surgical History:    No past surgical history on file. Home Medications:   Prior to Admission medications    Medication Sig Start Date End Date Taking?  Authorizing Provider   insulin lispro (HUMALOG) 100 UNIT/ML injection vial Inject 7 Units into the skin   Yes Historical Provider, MD   sertraline (ZOLOFT) 100 MG tablet Take 100 mg by mouth daily   Yes Historical Provider, MD   risperiDONE (RISPERDAL) 1 MG tablet Take 1 mg by mouth nightly   Yes Historical Provider, MD   risperiDONE (RISPERDAL) 0.25 MG tablet Take 0.25 mg by mouth 2 times daily   Yes Historical Provider, MD   pantoprazole sodium (PROTONIX) 40 MG PACK packet Take 40 mg by mouth every morning (before breakfast)   Yes Historical Provider, MD   docusate sodium (COLACE) 100 MG capsule Take 100 mg by mouth 2 times daily   Yes Historical Provider, MD   lactulose (CHRONULAC) 10 GM/15ML solution Take 20 g by mouth daily   Yes Historical Provider, MD   clonazePAM (KLONOPIN) 0.5 MG tablet Take 0.5 mg by mouth 3 times daily as needed. Yes Historical Provider, MD   HYDROcodone-acetaminophen (NORCO) 5-325 MG per tablet Take 1 tablet by mouth nightly. Yes Historical Provider, MD   simethicone (MYLICON) 80 MG chewable tablet Take 80 mg by mouth every 6 hours as needed for Flatulence   Yes Historical Provider, MD   metoprolol succinate (TOPROL XL) 50 MG extended release tablet Take 50 mg by mouth daily   Yes Historical Provider, MD   metFORMIN (GLUCOPHAGE-XR) 500 MG extended release tablet Take 500 mg by mouth every 4 hours   Yes Historical Provider, MD        Facility Administered Medications:    cefTRIAXone (ROCEPHIN) IV  1 g Intravenous Q24H    sodium chloride flush  10 mL Intravenous 2 times per day    aspirin  81 mg Oral Daily    atorvastatin  40 mg Oral Nightly    insulin glargine  15 Units Subcutaneous Nightly    insulin lispro  0-12 Units Subcutaneous TID WC    insulin lispro  0-6 Units Subcutaneous Nightly    lactulose  20 g Oral Daily    risperiDONE  0.25 mg Oral BID    sertraline  100 mg Oral Daily    docusate sodium  100 mg Oral BID    metoprolol succinate  50 mg Oral Daily       Allergies:  Patient has no known allergies.      Social History:       Social History     Socioeconomic History    Marital status: Single     Spouse name: Not on file    Number of children: Not on file    Years of education: Not on file    Highest education level: Not on file   Occupational History    Not on file   Social Needs    Financial resource strain: Not on file    Food insecurity     Worry: Not on file     Inability: Not on file    Transportation needs     Medical: Not on file     Non-medical: Not on file   Tobacco Use    Smoking status: Unknown If Ever Smoked    Tobacco comment: AMARILIS   Substance and Sexual Activity    Alcohol use: Not on file     Comment: AMARILIS    Drug use: Not on file    Sexual activity: Not on file   Lifestyle    Physical activity     Days per week: Not on file     Minutes per session: Not on file    Stress: Not on file   Relationships    Social connections     Talks on phone: Not on file     Gets together: Not on file     Attends Cheondoism service: Not on file     Active member of club or organization: Not on file     Attends meetings of clubs or organizations: Not on file     Relationship status: Not on file    Intimate partner violence     Fear of current or ex partner: Not on file     Emotionally abused: Not on file     Physically abused: Not on file     Forced sexual activity: Not on file   Other Topics Concern    Not on file   Social History Narrative    Not on file       Family History:   No family history on file. Review of Systems:  Constitutional:  No fever, chills, night sweats, or weight loss  HEENT: No vision loss, double vision, blurred vision, or tearing. No hearing loss, tinnitus, or infection. No nasal discharge or epistaxis. No dysphagia. Respiratory:  No shortness of breath, cough, or sputum production. No history of TB exposure. Cardiovascular: No hypertension, hypotension, anginal type chest pain, dizziness, or syncopal spells. No history of palpitations. Peripheral Vascular:  No history of claudication.    Gastrointestinal:  No nausea, vomiting, diarrhea, or constipation. No reflux or gastroesophageal reflux disease. Genitourinary:  No dysuria, urgency, frequency, or history of urinary tract infections. No history of nephrolithiasis or renal insufficiency. Neurological:  No history of cerebrovascular accident, transient ischemic attack, or amaurosis fugax. No history of seizure disorder. Integumentary: No rash, history of nonhealing wounds or skin cancer removal.   Psychiatric:  No anxiety or depression. Endocrine: No polyuria, polydipsia, or significant weight gain. No heat or cold intolerance. Musculoskeletal: No limit to range of motion of joints or swelling of limbs. Hematologic: No history of DVT, PE, or anemia. ROS obtained from family and team at bedside during catheterization.     Physical Examination:    /65   Pulse 85   Temp 99.4 °F (37.4 °C) (Axillary)   Resp 16   Ht 5' 4\" (1.626 m)   Wt 142 lb (64.4 kg)   SpO2 91%   BMI 24.37 kg/m²       General:  n/a  HEENT:  n/a  Neck: n/a  Respiratory: n/a  Cardiovascular: n/a  Pulses: n/a  GI: n/a  Musculoskeletal: n/a  Extremities: n/a  Skin: n/a  Neuro/psychiatric: n/a    MEDICAL DECISION MAKING/TESTING    Echo/YANN:      CXR:     EKG:     CT / MRI:     PET:     PFT:     Carotid duplex:    Labs:   CBC:   Recent Labs     12/20/20  0047 12/20/20  2326   WBC 7.8 6.6   HGB 10.5* 10.6*   HCT 33.1* 33.4*   MCV 82.1 83.5    126*     BMP:   Recent Labs     12/20/20  0047 12/20/20  0849 12/20/20  2326   *  --  139   K 3.7  --  3.7   CL 92*  --  96*   CO2 27  --  33*   PHOS  --  2.9  --    BUN 33*  --  21   CREATININE 1.3*  --  1.1*     Cardiac Enzymes:   Recent Labs     12/20/20  0331 12/20/20  0849 12/20/20  1125   TROPONINI 0.22* 0.32* 0.49*     PT/INR:   Recent Labs     12/20/20  0047   PROTIME 13.9   INR 1.07     APTT:   Recent Labs     12/20/20  1737 12/20/20  2326 12/21/20  0520   APTT 41.8* 56.9* 46.7*     Liver Profile:  Lab Results   Component Value Date AST 17 12/20/2020    ALT 10 12/20/2020    BILITOT 0.4 12/20/2020    ALKPHOS 88 12/20/2020     Lab Results   Component Value Date    CHOL 95 12/20/2020    HDL 40 12/20/2020    TRIG 97 12/20/2020     TSH:  No results found for: TSH  UA:   Lab Results   Component Value Date    COLORU Yellow 12/20/2020    PHUR 5.0 12/20/2020    WBCUA 6-9 12/20/2020    RBCUA 2-4 12/20/2020    BACTERIA 4+ 12/20/2020    CLARITYU Clear 12/20/2020    SPECGRAV 1.010 12/20/2020    LEUKOCYTESUR SMALL 12/20/2020    UROBILINOGEN 0.2 12/20/2020    BILIRUBINUR Negative 12/20/2020    BLOODU TRACE 12/20/2020    GLUCOSEU Negative 12/20/2020       Diagnosis:      Plan:     In brief, this is a very pleasant 77-year-old female with what appears to be severe proximal multivessel coronary artery disease and a culprit mid right coronary lesion in the setting of preserved ejection fraction and diabetes. While surgical revascularization in the setting would be anatomically reasonable in talking with the family as well as the rest of the team there are substantial concerns regarding her overall functional and social status. Specifically, she has been living in a nursing home for some time due to deafness as well as difficulty talking and poor communication. Her overall functional status has substantially deteriorated over the past 9 months in part due to Covid and she is barely able to take care of herself independently. By reports she is also severely deconditioned beyond what one would expect from her underlying coronary disease and according to her sister has had multiple hospitalizations over the past couple of months due to sepsis and some of the challenges that she has had in maintaining self-care.   As such and after a lengthy conversation with her sisters in person as well as over the phone we also share the concern that surgical revascularization while technically feasible would result in a substantial challenge for her recovery not just in the hospital but also with what ever her post discharge options are and it is unclear if she would ever get back to a functional status past where she is now given the overall picture. At this point I think we are all in agreement that her best options both short and long-term given the complex medical and social circumstances is to revascularize the target right coronary with a stent, see how she does from this and then reevaluate her for either definitive revascularization with surgery or additional stenting or even potentially manage the remainder of her coronary disease medically. All of which are reasonable options but at least at this point we can get her out of trouble, not burn any bridges, and give the family the opportunity to get a more comprehensive assessment of the circumstances and challenges that she would face if she had problems in the perioperative period which would not be unexpected given her disability and functional status. Nevertheless, the entire team as well as the family agrees with the plan and we will proceed with stenting of her right coronary at this time and reevaluate in the future. In the meantime if you have any additional questions or concerns regarding any aspects of our assessment please do not hesitate to contact me and I greatly appreciate the opportunity to be involved in her care. Of note, I was not able to independently evaluate this patient as she was draped and prepped in the Cath Lab after her initial catheterization while we were talking to the family. Furthermore due to her difficulties in communicating I was also not able to independently assess this patient at this time.       Sherri Trinh MD  12/21/2020  1:15 PM

## 2020-12-21 NOTE — PROGRESS NOTES
Permission given by Nanette Catherine, RN manager, for pts sister to stay with patient to help translate and communicate with patient.  Electronically signed by Abran Hernandez RN on 12/21/2020 at 8:25 AM

## 2020-12-22 LAB
ANION GAP SERPL CALCULATED.3IONS-SCNC: 11 MMOL/L (ref 7–19)
BASOPHILS ABSOLUTE: 0 K/UL (ref 0–0.2)
BASOPHILS RELATIVE PERCENT: 0.2 % (ref 0–1)
BUN BLDV-MCNC: 11 MG/DL (ref 8–23)
CALCIUM SERPL-MCNC: 7.8 MG/DL (ref 8.8–10.2)
CHLORIDE BLD-SCNC: 102 MMOL/L (ref 98–111)
CO2: 28 MMOL/L (ref 22–29)
CREAT SERPL-MCNC: 0.8 MG/DL (ref 0.5–0.9)
EOSINOPHILS ABSOLUTE: 0.1 K/UL (ref 0–0.6)
EOSINOPHILS RELATIVE PERCENT: 1.2 % (ref 0–5)
GFR AFRICAN AMERICAN: >59
GFR NON-AFRICAN AMERICAN: >60
GLUCOSE BLD-MCNC: 120 MG/DL (ref 74–109)
GLUCOSE BLD-MCNC: 121 MG/DL (ref 70–99)
GLUCOSE BLD-MCNC: 157 MG/DL (ref 70–99)
GLUCOSE BLD-MCNC: 170 MG/DL (ref 70–99)
GLUCOSE BLD-MCNC: 211 MG/DL (ref 70–99)
HCT VFR BLD CALC: 30 % (ref 37–47)
HEMOGLOBIN: 9.2 G/DL (ref 12–16)
IMMATURE GRANULOCYTES #: 0 K/UL
LYMPHOCYTES ABSOLUTE: 0.6 K/UL (ref 1.1–4.5)
LYMPHOCYTES RELATIVE PERCENT: 14.9 % (ref 20–40)
MAGNESIUM: 1.6 MG/DL (ref 1.6–2.4)
MCH RBC QN AUTO: 26 PG (ref 27–31)
MCHC RBC AUTO-ENTMCNC: 30.7 G/DL (ref 33–37)
MCV RBC AUTO: 84.7 FL (ref 81–99)
MONOCYTES ABSOLUTE: 0.7 K/UL (ref 0–0.9)
MONOCYTES RELATIVE PERCENT: 17.1 % (ref 0–10)
NEUTROPHILS ABSOLUTE: 2.8 K/UL (ref 1.5–7.5)
NEUTROPHILS RELATIVE PERCENT: 66.1 % (ref 50–65)
PDW BLD-RTO: 14.1 % (ref 11.5–14.5)
PERFORMED ON: ABNORMAL
PLATELET # BLD: 107 K/UL (ref 130–400)
PMV BLD AUTO: 9.9 FL (ref 9.4–12.3)
POTASSIUM REFLEX MAGNESIUM: 3.2 MMOL/L (ref 3.5–5)
POTASSIUM SERPL-SCNC: 3.5 MMOL/L (ref 3.5–5)
RBC # BLD: 3.54 M/UL (ref 4.2–5.4)
SARS-COV-2, PCR: NOT DETECTED
SODIUM BLD-SCNC: 141 MMOL/L (ref 136–145)
WBC # BLD: 4.2 K/UL (ref 4.8–10.8)

## 2020-12-22 PROCEDURE — 6370000000 HC RX 637 (ALT 250 FOR IP): Performed by: FAMILY MEDICINE

## 2020-12-22 PROCEDURE — 82947 ASSAY GLUCOSE BLOOD QUANT: CPT

## 2020-12-22 PROCEDURE — 85025 COMPLETE CBC W/AUTO DIFF WBC: CPT

## 2020-12-22 PROCEDURE — 80048 BASIC METABOLIC PNL TOTAL CA: CPT

## 2020-12-22 PROCEDURE — 84132 ASSAY OF SERUM POTASSIUM: CPT

## 2020-12-22 PROCEDURE — 92928 PRQ TCAT PLMT NTRAC ST 1 LES: CPT | Performed by: INTERNAL MEDICINE

## 2020-12-22 PROCEDURE — 2580000003 HC RX 258: Performed by: HOSPITALIST

## 2020-12-22 PROCEDURE — 99152 MOD SED SAME PHYS/QHP 5/>YRS: CPT | Performed by: INTERNAL MEDICINE

## 2020-12-22 PROCEDURE — 93454 CORONARY ARTERY ANGIO S&I: CPT | Performed by: INTERNAL MEDICINE

## 2020-12-22 PROCEDURE — 6370000000 HC RX 637 (ALT 250 FOR IP): Performed by: STUDENT IN AN ORGANIZED HEALTH CARE EDUCATION/TRAINING PROGRAM

## 2020-12-22 PROCEDURE — 36415 COLL VENOUS BLD VENIPUNCTURE: CPT

## 2020-12-22 PROCEDURE — 6370000000 HC RX 637 (ALT 250 FOR IP): Performed by: HOSPITALIST

## 2020-12-22 PROCEDURE — 2140000000 HC CCU INTERMEDIATE R&B

## 2020-12-22 PROCEDURE — 83735 ASSAY OF MAGNESIUM: CPT

## 2020-12-22 PROCEDURE — 6360000002 HC RX W HCPCS: Performed by: HOSPITALIST

## 2020-12-22 PROCEDURE — 6370000000 HC RX 637 (ALT 250 FOR IP): Performed by: INTERNAL MEDICINE

## 2020-12-22 PROCEDURE — U0003 INFECTIOUS AGENT DETECTION BY NUCLEIC ACID (DNA OR RNA); SEVERE ACUTE RESPIRATORY SYNDROME CORONAVIRUS 2 (SARS-COV-2) (CORONAVIRUS DISEASE [COVID-19]), AMPLIFIED PROBE TECHNIQUE, MAKING USE OF HIGH THROUGHPUT TECHNOLOGIES AS DESCRIBED BY CMS-2020-01-R: HCPCS

## 2020-12-22 PROCEDURE — 92610 EVALUATE SWALLOWING FUNCTION: CPT

## 2020-12-22 PROCEDURE — 87040 BLOOD CULTURE FOR BACTERIA: CPT

## 2020-12-22 RX ORDER — POTASSIUM CHLORIDE 7.45 MG/ML
10 INJECTION INTRAVENOUS PRN
Status: DISCONTINUED | OUTPATIENT
Start: 2020-12-22 | End: 2020-12-23 | Stop reason: HOSPADM

## 2020-12-22 RX ORDER — POTASSIUM CHLORIDE 20 MEQ/1
40 TABLET, EXTENDED RELEASE ORAL PRN
Status: DISCONTINUED | OUTPATIENT
Start: 2020-12-22 | End: 2020-12-23 | Stop reason: HOSPADM

## 2020-12-22 RX ADMIN — Medication 15 UNITS: at 19:52

## 2020-12-22 RX ADMIN — DOCUSATE SODIUM 100 MG: 100 CAPSULE ORAL at 08:51

## 2020-12-22 RX ADMIN — SERTRALINE 100 MG: 100 TABLET, FILM COATED ORAL at 12:58

## 2020-12-22 RX ADMIN — ASPIRIN 81 MG: 81 TABLET, CHEWABLE ORAL at 08:50

## 2020-12-22 RX ADMIN — SODIUM CHLORIDE, PRESERVATIVE FREE 1 G: 5 INJECTION INTRAVENOUS at 23:38

## 2020-12-22 RX ADMIN — MICONAZOLE NITRATE: 2 POWDER TOPICAL at 08:51

## 2020-12-22 RX ADMIN — METOPROLOL SUCCINATE 50 MG: 50 TABLET, EXTENDED RELEASE ORAL at 08:51

## 2020-12-22 RX ADMIN — SODIUM CHLORIDE, PRESERVATIVE FREE 10 ML: 5 INJECTION INTRAVENOUS at 08:51

## 2020-12-22 RX ADMIN — CLOPIDOGREL BISULFATE 75 MG: 75 TABLET ORAL at 08:51

## 2020-12-22 RX ADMIN — ACETAMINOPHEN 650 MG: 325 TABLET ORAL at 13:06

## 2020-12-22 RX ADMIN — POTASSIUM BICARBONATE 40 MEQ: 782 TABLET, EFFERVESCENT ORAL at 05:29

## 2020-12-22 RX ADMIN — INSULIN LISPRO 2 UNITS: 100 INJECTION, SOLUTION INTRAVENOUS; SUBCUTANEOUS at 12:59

## 2020-12-22 RX ADMIN — ATORVASTATIN CALCIUM 40 MG: 40 TABLET, FILM COATED ORAL at 19:49

## 2020-12-22 RX ADMIN — INSULIN LISPRO 4 UNITS: 100 INJECTION, SOLUTION INTRAVENOUS; SUBCUTANEOUS at 16:53

## 2020-12-22 RX ADMIN — RISPERIDONE 0.25 MG: 0.25 TABLET ORAL at 08:51

## 2020-12-22 RX ADMIN — RISPERIDONE 0.25 MG: 0.25 TABLET ORAL at 19:49

## 2020-12-22 RX ADMIN — SODIUM CHLORIDE, PRESERVATIVE FREE 1 G: 5 INJECTION INTRAVENOUS at 00:48

## 2020-12-22 ASSESSMENT — PAIN SCALES - GENERAL
PAINLEVEL_OUTOF10: 0
PAINLEVEL_OUTOF10: 0
PAINLEVEL_OUTOF10: 4
PAINLEVEL_OUTOF10: 0
PAINLEVEL_OUTOF10: 0

## 2020-12-22 ASSESSMENT — PAIN SCALES - WONG BAKER
WONGBAKER_NUMERICALRESPONSE: 0

## 2020-12-22 ASSESSMENT — PAIN DESCRIPTION - PAIN TYPE: TYPE: ACUTE PAIN

## 2020-12-22 ASSESSMENT — PAIN DESCRIPTION - LOCATION: LOCATION: HEAD

## 2020-12-22 NOTE — PROGRESS NOTES
Progress Note  Date:2020       Room:0718/718-02  Patient Name:Juliette Morrison     YOB: 1945     Age:75 y.o. Subjective    Subjective   Patient seen and examined this a.m. sitting up in bedside chair, nurse as well as sister present in the room. Patient is status post cardiology intervention yesterday with stents. Improved spirits, note of 1 of 2 blood cultures positive we will repeat this a.m. Cumulative hospital course: Patient was admitted , brought to the emergency room via EMS due to hypotension, . Urinalysis with evidence of UTI continues on antibiotic therapy. Noted troponin elevation as well as left bundle branch block on EKG no prior, elevated lactic acid level. Patient admitted to PCU with cardiology consultation, plans for echocardiogram remains on heparin drip, informed by nursing that [place] stress has been canceled due to elevated troponin level. .  We will continue to monitor and correct electrolyte, status post stenting by cardiology yesterday, repeat blood culture. Obtain Covid screening plans for discharge to nursing facility 2020. Review of Systems   Unable to perform ROS: Acuity of condition        Objective         Vitals Last 24 Hours:  TEMPERATURE:  Temp  Av.8 °F (36.6 °C)  Min: 97.1 °F (36.2 °C)  Max: 98.5 °F (36.9 °C)  RESPIRATIONS RANGE: Resp  Avg: 15.2  Min: 14  Max: 16  PULSE OXIMETRY RANGE: SpO2  Av.3 %  Min: 90 %  Max: 100 %  PULSE RANGE: Pulse  Av.7  Min: 84  Max: 96  BLOOD PRESSURE RANGE: Systolic (58LDM), FRJ:908 , Min:123 , DVQ:862   ; Diastolic (71AQJ), PSE:34, Min:67, Max:79    I/O (24Hr): Intake/Output Summary (Last 24 hours) at 2020 1143  Last data filed at 2020 0850  Gross per 24 hour   Intake 1291.25 ml   Output 500 ml   Net 791.25 ml     Physical Exam  Vitals signs reviewed.    Constitutional:       Comments: Fatigued appearing   HENT:      Ears:      Comments: Patient deaf  Eyes:      Comments: Patient blind   Cardiovascular:      Rate and Rhythm: Normal rate. Rhythm irregular. Pulmonary:      Comments: Nasal cannula oxygen in place  Abdominal:      Tenderness: There is no abdominal tenderness. There is no guarding or rebound. Musculoskeletal:      Comments: Chest tenderness improved   Neurological:      Mental Status: She is alert. Mental status is at baseline. Labs/Imaging/Diagnostics    Labs:  CBC:  Recent Labs     12/20/20  0047 12/20/20 2326 12/22/20  0259   WBC 7.8 6.6 4.2*   RBC 4.03* 4.00* 3.54*   HGB 10.5* 10.6* 9.2*   HCT 33.1* 33.4* 30.0*   MCV 82.1 83.5 84.7   RDW 14.1 14.3 14.1    126* 107*     CHEMISTRIES:  Recent Labs     12/20/20  0047 12/20/20  0849 12/20/20 2326 12/22/20  0259   *  --  139 141   K 3.7  --  3.7 3.2*   CL 92*  --  96* 102   CO2 27  --  33* 28   BUN 33*  --  21 11   CREATININE 1.3*  --  1.1* 0.8   GLUCOSE 223*  --  97 120*   PHOS  --  2.9  --   --    MG  --  1.1*  --  1.6     PT/INR:  Recent Labs     12/20/20  0047   PROTIME 13.9   INR 1.07     APTT:  Recent Labs     12/20/20  1737 12/20/20  2326 12/21/20  0520   APTT 41.8* 56.9* 46.7*     LIVER PROFILE:  Recent Labs     12/20/20  0047   AST 17   ALT 10   BILITOT 0.4   ALKPHOS 88       Imaging Last 24 Hours:  Xr Chest Portable    Result Date: 12/20/2020  Exam: XR CHEST PORTABLE - 12/19/2020 11:50 PM Indication: Fever, hypotension Comparison: None available. Findings: Cardiac silhouette is mildly enlarged. No pleural effusion, pneumothorax, or focal consolidative airspace process. Mild interstitial opacity throughout both lungs. Suspected mild atelectasis at both lung bases. No acute osseous finding. Impression: 1. No consolidation to suggest pneumonia. 2.  Mild interstitial opacity, correlate for mild edema. 3.  Mild cardiomegaly.  Signed by Dr Benedicto Acosta on 12/20/2020 6:29 AM    Cta Pulmonary W Contrast    Result Date: 12/20/2020  Exam: CT angiography with 3D MIP images chest with IV contrast - 12/20/2020 3:55 AM Indication: Hypoxia, elevated troponin Comparison: None available. DLP: 628 mGy cm. In order to have a CT radiation dose as low as reasonably achievable, Automated Exposure Control was utilized for adjustment of the mA and/or KV according to patient size. Findings: No pulmonary embolus. Main pulmonary trunk is nondilated. Thoracic aorta is normal caliber. Atherosclerotic calcification plaque throughout the thoracic aorta and coronary arteries. Heart is enlarged. No pericardial effusion. Mild atherosclerotic narrowing of the SMA and celiac artery origins. Central airways are clear. Bibasilar atelectasis and mild interlobular septal thickening in the lung bases. No consolidation or pleural effusion. No suspicious pulmonary nodule. No enlarged thoracic lymph nodes. Uniform mildly enlarged thyroid. Mild nonspecific LEFT perinephric inflammatory stranding (no comparison available at this finding is described on an outside CT from 10/10/2019). No acute osseous finding. Impression: 1. No evidence of pulmonary embolism. 2.  Mild cardiomegaly. 3.  Bibasilar atelectasis. 4.  Mild interlobular septal thickening, correlate for mild pulmonary edema. No consolidative process or pleural effusion. Findings in agreement with the emergent findings from the initial [facility] preliminary report. Signed by Dr Cassandria Romberg on 12/20/2020 8:31 AM    Assessment//Plan           Hospital Problems           Last Modified POA    * (Principal) LBBB (left bundle branch block) 12/20/2020 Yes    UTI (urinary tract infection) 12/20/2020 Yes    Elevated troponin 12/20/2020 Yes    Hypotension 12/22/2020 Yes        Acute cystitis with trace hematuria  -Patient continues on IV antibiotics  -Encourage oral hydration  -Tylenol as needed for pain or temperature elevation greater than 100.4    Bacteremia?   1/2 blood cultures positive from 12/20  -Repeated 12/22  -Continues on IV ceftriaxone, possible contaminant    Elevated troponin/EKG with left bundle branch block/coronary artery disease/non-STEMI  -Cardiology has been consulted, appreciate recommendation  -Troponin trend 0.08--> 0.22--> 0.32 --> 0.49  -Status post PCI with 3 stents placed to the RCA  -Patient to continue on Plavix/aspirin regimen  -Follow-up with cardiology in 1 month time for possible further intervention    Elevated lactic acid level  -Resolved    Hypokalemia  -Continue with supplementation    Hypomagnesemia  -Continue with supplementation    Chronic combined systolic and diastolic congestive heart failure  -Noted elevation of proBNP, monitor patient's I/os  -Echocardiogram ordered we will follow up    CKD stage III  -Appears stable, continue to renally dose medications, avoid nephrotoxic agent  -Daily metabolic profile      Type 2 diabetes-chronic condition, hold oral antihyperglycemics, continue sliding scale insulin, Lantus dosing, Accu-Cheks q. ACH S, C carb diet, hypoglycemic protocol    Essential hypertension-chronic condition, continue with metoprolol daily    Depression/anxiety  -Continue with Zoloft    Baseline dementia/deafness?  -Clarification had by patient's POA sister at the bedside, patient has been deaf, also trouble with vision since young after suffering severe fever  - Fall precautions, PT/OT  -Is for discharge back to nursing facility tomorrow      EMR Dragon/Transcription disclaimer:   Much of this encounter note is an electronic transcription/translation of spoken language to printed text.  The electronic translation of spoken language may permit erroneous, or at times, nonsensical words or phrases to be inadvertently transcribed; although attempts have made to review the note for such errors, some may still exist.    Electronically signed by   Wendy Isidro   Internal Medicine Hospitalist  On 12/22/2020  At 11:43 AM

## 2020-12-22 NOTE — CARE COORDINATION
Spoke with Asst. KEYS at [address]  who confirms has agreed for the pt to dc home with family from [facility] and to return to the facility after [place] on Monday 12/28. SW agreed to have all dc summary and med rec faxed per usual to the facility for pt meds accuracy, as well as, covid results.      KATHIE PADGETT [facility] Ph: [phone] F: [phone]

## 2020-12-22 NOTE — PROGRESS NOTES
Speech Language Pathology  Facility/Department: [address] CARE   CLINICAL BEDSIDE SWALLOW EVALUATION    NAME: Bert Silva  : 1945  MRN: [id number]    ADMISSION DATE: 2020  ADMITTING DIAGNOSIS: has LBBB (left bundle branch block); Essential hypertension; DMII (diabetes mellitus, type 2) ([facility] [address] Chronic combined systolic and diastolic congestive heart failure ([facility] [address] UTI (urinary tract infection); Elevated troponin; and Hypotension on their problem list.    Date of Eval: 2020  Evaluating Therapist: Concepción Wall    Current Diet level:  Regular solid consistency and thin liquids; present family member requested softer foods secondary to lack of dentition. Reason for Referral  Bert Silva was referred for a bedside swallow evaluation to assess the efficiency of her swallow function, identify signs and symptoms of aspiration and make recommendations regarding safe dietary consistencies, effective compensatory strategies, and safe eating environment. Impression  Assessed patient's swallowing function. Patient exhibits decreased oral prep of more solid consistencies as well as sluggish laryngeal elevation for swallow airway protection. Even so, no outward S/S penetration/aspiration was noted with any soft consistency trial or thin H2O trial administered during evaluation this date. At this time, would recommend ground meats with sauces/gravy and soft sides with thin liquids. Recommend meds whole in pudding/applesauce. Thank you for this consult. Treatment Plan  Requires SLP Intervention: No    Recommended Diet and Intervention  Diet Solids Recommendation: Ground meats with sauces/gravy and soft sides  Liquid Consistency Recommendation: Thin   Recommended Form of Meds: Meds whole in puree as able     Compensatory Swallowing Strategies  Compensatory Swallowing Strategies: Upright as possible for all oral intake;Small bites/sips;Eat/Feed slowly; Alternate solids and liquids; Remain upright for 30-45 minutes after meals     General  Chart Reviewed: Yes  Behavior/Cognition: Alert; Cooperative  O2 Device: None (Room air)  Follows Directions: Simple   Dentition: Edentulous   Patient Positioning: Upright in bed  Consistencies Administered: Soft; Thin - straw     Assessed patient's swallowing function with the following observations noted:     Oral Phase  Mastication: Soft (Patient exhibited adequate vertical jaw movement at the front of the mouth during oral prep of soft consistency trials presented by family member.)  Suspected Premature Bolus Loss: Soft (Oral transit of soft consistency trials varied from 1-3 seconds in length.)  Oral Phase - Comment: Min soft consistency residue was noted post swallows; residue cleared from the mouth with additional dry swallows. Oral transit of thin H2O trials, presented via straw by family member, primarily measured 1-2 seconds in length. Pharyngeal Phase  Suspected Swallow Delay: Soft (Suspect secondary to oral transit times.)  Laryngeal Elevation: (Patient exhibited sluggish laryngeal elevation for swallow airway protection.)  Pharyngeal Phase - Comment: No outward S/S penetration/aspiration was noted with any soft consistency trial or thin H2O trial administered during assessment this date. At this time, would recommend ground meats with sauces/gravy and soft sides with thin liquids. Recommend meds whole in pudding/applesauce. No further acute speech therapy is felt to be warranted. Patient to be D/C.      Electronically signed by MERT Belcher on 12/22/2020 at 12:14 PM

## 2020-12-22 NOTE — PROGRESS NOTES
TR band off. Site is Clean dry an dintact with no oozing.  Electronically signed by Darrius Mandujano RN on 12/22/2020 at 3:32 AM

## 2020-12-23 VITALS
OXYGEN SATURATION: 93 % | HEIGHT: 64 IN | SYSTOLIC BLOOD PRESSURE: 150 MMHG | WEIGHT: 142 LBS | RESPIRATION RATE: 16 BRPM | DIASTOLIC BLOOD PRESSURE: 80 MMHG | TEMPERATURE: 96.6 F | HEART RATE: 105 BPM | BODY MASS INDEX: 24.24 KG/M2

## 2020-12-23 PROBLEM — F51.05 INSOMNIA DUE TO ANXIETY AND FEAR: Status: ACTIVE | Noted: 2020-12-23

## 2020-12-23 PROBLEM — F40.9 INSOMNIA DUE TO ANXIETY AND FEAR: Status: ACTIVE | Noted: 2020-12-23

## 2020-12-23 LAB
ANION GAP SERPL CALCULATED.3IONS-SCNC: 12 MMOL/L (ref 7–19)
BASOPHILS ABSOLUTE: 0 K/UL (ref 0–0.2)
BASOPHILS RELATIVE PERCENT: 0.2 % (ref 0–1)
BLOOD CULTURE, ROUTINE: ABNORMAL
BLOOD CULTURE, ROUTINE: ABNORMAL
BUN BLDV-MCNC: 7 MG/DL (ref 8–23)
CALCIUM SERPL-MCNC: 8.8 MG/DL (ref 8.8–10.2)
CHLORIDE BLD-SCNC: 99 MMOL/L (ref 98–111)
CO2: 29 MMOL/L (ref 22–29)
CREAT SERPL-MCNC: 0.7 MG/DL (ref 0.5–0.9)
EOSINOPHILS ABSOLUTE: 0.1 K/UL (ref 0–0.6)
EOSINOPHILS RELATIVE PERCENT: 0.8 % (ref 0–5)
GFR AFRICAN AMERICAN: >59
GFR NON-AFRICAN AMERICAN: >60
GLUCOSE BLD-MCNC: 142 MG/DL (ref 74–109)
GLUCOSE BLD-MCNC: 146 MG/DL (ref 70–99)
GLUCOSE BLD-MCNC: 281 MG/DL (ref 70–99)
HCT VFR BLD CALC: 32.2 % (ref 37–47)
HEMOGLOBIN: 10.2 G/DL (ref 12–16)
IMMATURE GRANULOCYTES #: 0 K/UL
LYMPHOCYTES ABSOLUTE: 0.9 K/UL (ref 1.1–4.5)
LYMPHOCYTES RELATIVE PERCENT: 15.2 % (ref 20–40)
MAGNESIUM: 1.4 MG/DL (ref 1.6–2.4)
MCH RBC QN AUTO: 26.1 PG (ref 27–31)
MCHC RBC AUTO-ENTMCNC: 31.7 G/DL (ref 33–37)
MCV RBC AUTO: 82.4 FL (ref 81–99)
MONOCYTES ABSOLUTE: 0.8 K/UL (ref 0–0.9)
MONOCYTES RELATIVE PERCENT: 12.9 % (ref 0–10)
NEUTROPHILS ABSOLUTE: 4.2 K/UL (ref 1.5–7.5)
NEUTROPHILS RELATIVE PERCENT: 70.6 % (ref 50–65)
ORGANISM: ABNORMAL
PDW BLD-RTO: 13.7 % (ref 11.5–14.5)
PERFORMED ON: ABNORMAL
PERFORMED ON: ABNORMAL
PLATELET # BLD: 171 K/UL (ref 130–400)
PMV BLD AUTO: 10 FL (ref 9.4–12.3)
POTASSIUM REFLEX MAGNESIUM: 3.5 MMOL/L (ref 3.5–5)
RBC # BLD: 3.91 M/UL (ref 4.2–5.4)
SODIUM BLD-SCNC: 140 MMOL/L (ref 136–145)
WBC # BLD: 6 K/UL (ref 4.8–10.8)

## 2020-12-23 PROCEDURE — 2580000003 HC RX 258: Performed by: HOSPITALIST

## 2020-12-23 PROCEDURE — 80048 BASIC METABOLIC PNL TOTAL CA: CPT

## 2020-12-23 PROCEDURE — 83735 ASSAY OF MAGNESIUM: CPT

## 2020-12-23 PROCEDURE — 85025 COMPLETE CBC W/AUTO DIFF WBC: CPT

## 2020-12-23 PROCEDURE — 6370000000 HC RX 637 (ALT 250 FOR IP): Performed by: FAMILY MEDICINE

## 2020-12-23 PROCEDURE — 6370000000 HC RX 637 (ALT 250 FOR IP): Performed by: HOSPITALIST

## 2020-12-23 PROCEDURE — 6370000000 HC RX 637 (ALT 250 FOR IP): Performed by: INTERNAL MEDICINE

## 2020-12-23 PROCEDURE — 36415 COLL VENOUS BLD VENIPUNCTURE: CPT

## 2020-12-23 PROCEDURE — 6360000002 HC RX W HCPCS: Performed by: FAMILY MEDICINE

## 2020-12-23 PROCEDURE — 82947 ASSAY GLUCOSE BLOOD QUANT: CPT

## 2020-12-23 RX ORDER — MAGNESIUM SULFATE 1 G/100ML
1 INJECTION INTRAVENOUS ONCE
Status: COMPLETED | OUTPATIENT
Start: 2020-12-23 | End: 2020-12-23

## 2020-12-23 RX ORDER — CLOPIDOGREL BISULFATE 75 MG/1
75 TABLET ORAL DAILY
Qty: 30 TABLET | Refills: 3 | Status: SHIPPED | OUTPATIENT
Start: 2020-12-24

## 2020-12-23 RX ORDER — CEFDINIR 300 MG/1
300 CAPSULE ORAL 2 TIMES DAILY
Qty: 22 CAPSULE | Refills: 0 | Status: SHIPPED | OUTPATIENT
Start: 2020-12-23 | End: 2021-01-03

## 2020-12-23 RX ORDER — ATORVASTATIN CALCIUM 40 MG/1
40 TABLET, FILM COATED ORAL NIGHTLY
Qty: 30 TABLET | Refills: 3 | Status: SHIPPED | OUTPATIENT
Start: 2020-12-23

## 2020-12-23 RX ORDER — CLONAZEPAM 0.5 MG/1
0.5 TABLET ORAL 3 TIMES DAILY PRN
Qty: 9 TABLET | Refills: 0 | Status: ON HOLD | OUTPATIENT
Start: 2020-12-23 | End: 2021-06-23

## 2020-12-23 RX ORDER — HYDROCODONE BITARTRATE AND ACETAMINOPHEN 5; 325 MG/1; MG/1
1 TABLET ORAL NIGHTLY
Qty: 3 TABLET | Refills: 0 | Status: SHIPPED | OUTPATIENT
Start: 2020-12-23 | End: 2020-12-26

## 2020-12-23 RX ADMIN — RISPERIDONE 0.25 MG: 0.25 TABLET ORAL at 09:57

## 2020-12-23 RX ADMIN — ASPIRIN 81 MG: 81 TABLET, CHEWABLE ORAL at 09:57

## 2020-12-23 RX ADMIN — MICONAZOLE NITRATE: 2 POWDER TOPICAL at 10:06

## 2020-12-23 RX ADMIN — SERTRALINE 100 MG: 100 TABLET, FILM COATED ORAL at 09:57

## 2020-12-23 RX ADMIN — MAGNESIUM SULFATE HEPTAHYDRATE 1 G: 1 INJECTION, SOLUTION INTRAVENOUS at 09:58

## 2020-12-23 RX ADMIN — DOCUSATE SODIUM 100 MG: 100 CAPSULE ORAL at 09:58

## 2020-12-23 RX ADMIN — CLOPIDOGREL BISULFATE 75 MG: 75 TABLET ORAL at 09:57

## 2020-12-23 RX ADMIN — SODIUM CHLORIDE, PRESERVATIVE FREE 10 ML: 5 INJECTION INTRAVENOUS at 09:58

## 2020-12-23 RX ADMIN — INSULIN LISPRO 6 UNITS: 100 INJECTION, SOLUTION INTRAVENOUS; SUBCUTANEOUS at 12:43

## 2020-12-23 RX ADMIN — INSULIN LISPRO 2 UNITS: 100 INJECTION, SOLUTION INTRAVENOUS; SUBCUTANEOUS at 09:58

## 2020-12-23 RX ADMIN — METOPROLOL SUCCINATE 50 MG: 50 TABLET, EXTENDED RELEASE ORAL at 09:58

## 2020-12-23 RX ADMIN — LACTULOSE 20 G: 20 SOLUTION ORAL at 09:58

## 2020-12-23 NOTE — PROGRESS NOTES
Called report to Russ Chance at [facility]. Faxed med rec, dc summary, and covid results to facility. Hard copy sent with family.    Electronically signed by Jesús Douglass on 12/23/2020 at 12:10 PM

## 2020-12-23 NOTE — PROGRESS NOTES
Mag level 1.4. Obtained ordered for replacement and initiated 1g/100ml.    Electronically signed by Renee Krishnan on 12/23/2020 at 10:48 AM

## 2020-12-23 NOTE — DISCHARGE SUMMARY
Discharge Summary  Date:12/23/2020        Patient Name:Juliette Aguirre     YOB: 1945     Age:75 y.o. Admit Date:12/20/2020   Admission Condition:poor   Discharged Condition:fair  Discharge Date: 12/23/20     Discharge Diagnoses   Principal Problem:    LBBB (left bundle branch block)  Active Problems:    UTI (urinary tract infection)    Elevated troponin    Hypotension    Insomnia due to anxiety and fear  Resolved Problems:    * No resolved hospital problems. [facility] Stay   Narrative of Hospital Course: Patient was admitted 12/20, brought to the emergency room via EMS due to hypotension, . Urinalysis with evidence of UTI continues on antibiotic therapy. Noted troponin elevation as well as left bundle branch block on EKG no prior, elevated lactic acid level. Patient admitted to PCU with cardiology consultation, plans for echocardiogram remains on heparin drip, informed by nursing that [place] stress has been canceled due to elevated troponin level. Patient ultimately underwent cardiac catheterization with 3 stents placed to the RCA. Patient was seen by cardiothoracic surgery risk and benefits weighed for further revascularization procedure. Electrolytes replenished prior to DC. Patient and family will discuss these findings and follow-up with cardiology in 1 month time. Addition of statin therapy as well as Plavix to patient's regimen. Aspirin/Plavix to be taken for at least 1 year. Patient to follow-up with primary care provider in 1 to 2 weeks for hospital discharge was ongoing coordination of care. Case management is working with family and patient plans to discharge home with family for the week for the holiday season and then patient will proceed to [facility] on 12/28/2020.      Consultants:   IP CONSULT TO CARDIOLOGY  IP CONSULT TO CARDIOLOGY  IP CONSULT TO CARDIAC REHAB    Time Spent on Discharge:  45  minutes were spent in patient examination, evaluation, counseling as well as medication reconciliation, prescriptions for required medications, discharge plan and follow up.       Surgeries/Procedures Performed:       Treatments:   IV hydration, antibiotics: ceftriaxone, analgesia: acetaminophen, cardiac meds: Lipitor, Toprol-XL, nitroglycerin as needed, anticoagulation: ASA, Plavix and LMW heparin, insulin: Humalog and Lantus, respiratory therapy: O2 and electrolyte stabilization    Significant Diagnostic Studies:   Recent Labs:  CBC:   Lab Results   Component Value Date    WBC 6.0 12/23/2020    RBC 3.91 12/23/2020    HGB 10.2 12/23/2020    HCT 32.2 12/23/2020    MCV 82.4 12/23/2020    MCH 26.1 12/23/2020    MCHC 31.7 12/23/2020    RDW 13.7 12/23/2020     12/23/2020     BMP:    Lab Results   Component Value Date    GLUCOSE 142 12/23/2020     12/23/2020    K 3.5 12/23/2020    CL 99 12/23/2020    CO2 29 12/23/2020    ANIONGAP 12 12/23/2020    BUN 7 12/23/2020    CREATININE 0.7 12/23/2020    CALCIUM 8.8 12/23/2020    LABGLOM >60 12/23/2020    GFRAA >59 12/23/2020     HFP:    Lab Results   Component Value Date    PROT 6.9 12/20/2020     CMP:    Lab Results   Component Value Date    GLUCOSE 142 12/23/2020     12/23/2020    K 3.5 12/23/2020    CL 99 12/23/2020    CO2 29 12/23/2020    BUN 7 12/23/2020    CREATININE 0.7 12/23/2020    ANIONGAP 12 12/23/2020    ALKPHOS 88 12/20/2020    ALT 10 12/20/2020    AST 17 12/20/2020    BILITOT 0.4 12/20/2020    LABALBU 3.6 12/20/2020    LABGLOM >60 12/23/2020    GFRAA >59 12/23/2020    PROT 6.9 12/20/2020    CALCIUM 8.8 12/23/2020     PT/INR:    Lab Results   Component Value Date    PROTIME 13.9 12/20/2020    INR 1.07 12/20/2020     PTT:   Lab Results   Component Value Date    APTT 46.7 12/21/2020     FLP:    Lab Results   Component Value Date    CHOL 95 12/20/2020    TRIG 97 12/20/2020    HDL 40 12/20/2020     U/A:    Lab Results   Component Value Date    COLORU Yellow 12/20/2020    SPECGRAV 1.010 12/20/2020    PHUR 5.0 12/20/2020 PROTEINU 30 12/20/2020    GLUCOSEU Negative 12/20/2020    KETUA Negative 12/20/2020    BILIRUBINUR Negative 12/20/2020    UROBILINOGEN 0.2 12/20/2020    NITRU POSITIVE 12/20/2020    LEUKOCYTESUR SMALL 12/20/2020     TSH:  No results found for: TSH    Radiology Last 7 Days:  Xr Chest Portable    Result Date: 12/20/2020  Impression: 1. No consolidation to suggest pneumonia. 2.  Mild interstitial opacity, correlate for mild edema. 3.  Mild cardiomegaly. Signed by Dr Bhargavi Coyd on 12/20/2020 6:29 AM    Cta Pulmonary W Contrast    Result Date: 12/20/2020  Impression: 1. No evidence of pulmonary embolism. 2.  Mild cardiomegaly. 3.  Bibasilar atelectasis. 4.  Mild interlobular septal thickening, correlate for mild pulmonary edema. No consolidative process or pleural effusion. Findings in agreement with the emergent findings from the initial [facility] preliminary report. Signed by Dr Bhargavi Cody on 12/20/2020 8:31 AM    Physical Exam  Vitals signs reviewed. Constitutional:       Comments: Clinical tone improved, patient sitting up on bedside chair  HENT:      Ears:      Comments: Patient deaf  Eyes:      Comments: Patient blind   Cardiovascular:      Rate and Rhythm: Normal rate. Rhythm irregular. Pulmonary:      Comments: Nasal cannula oxygen in place  Abdominal:      Tenderness: There is no abdominal tenderness. There is no guarding or rebound. Musculoskeletal:      Comments: Chest tenderness improved   Neurological:      Mental Status: She is alert.  Mental status is at baseline.         Discharge Plan   Disposition: Home - Pt then to Proceed to SNF on 12/28/2020     Provider Follow-Up:   MEKA Bonilla[address]  [phone]    On 1/19/2021  2:30 PM         Patient Instructions   Diet: diabetic diet    Activity: activity as tolerated    Discharge Medications         Medication List      START taking these medications    atorvastatin 40 MG tablet  Commonly known as: LIPITOR  Take 1 tablet by mouth nightly     cefdinir 300 MG capsule  Commonly known as: OMNICEF  Take 1 capsule by mouth 2 times daily for 11 days     clopidogrel 75 MG tablet  Commonly known as: PLAVIX  Take 1 tablet by mouth daily  Start taking on: December 24, 2020     miconazole 2 % powder  Commonly known as: MICOTIN  Apply topically 2 times daily. CHANGE how you take these medications    clonazePAM 0.5 MG tablet  Commonly known as: KLONOPIN  Take 1 tablet by mouth 3 times daily as needed for Anxiety for up to 3 days. What changed: reasons to take this        CONTINUE taking these medications    docusate sodium 100 MG capsule  Commonly known as: COLACE     HYDROcodone-acetaminophen 5-325 MG per tablet  Commonly known as: NORCO  Take 1 tablet by mouth nightly for 3 days. insulin lispro 100 UNIT/ML injection vial  Commonly known as: HUMALOG     lactulose 10 GM/15ML solution  Commonly known as: CHRONULAC     metFORMIN 500 MG extended release tablet  Commonly known as: GLUCOPHAGE-XR     metoprolol succinate 50 MG extended release tablet  Commonly known as: TOPROL XL     pantoprazole sodium 40 MG Pack packet  Commonly known as: PROTONIX     * risperiDONE 1 MG tablet  Commonly known as: RISPERDAL     * RisperDAL 0.25 MG tablet  Generic drug: risperiDONE     sertraline 100 MG tablet  Commonly known as: ZOLOFT     simethicone 80 MG chewable tablet  Commonly known as: MYLICON         * This list has 2 medication(s) that are the same as other medications prescribed for you. Read the directions carefully, and ask your doctor or other care provider to review them with you.                Where to Get Your Medications      You can get these medications from any pharmacy    Bring a paper prescription for each of these medications  · atorvastatin 40 MG tablet  · cefdinir 300 MG capsule  · clonazePAM 0.5 MG tablet  · clopidogrel 75 MG tablet  · HYDROcodone-acetaminophen 5-325 MG per tablet  · miconazole 2 % powder EMR Dragon/Transcription disclaimer:   Much of this encounter note is an electronic transcription/translation of spoken language to printed text.  The electronic translation of spoken language may permit erroneous, or at times, nonsensical words or phrases to be inadvertently transcribed; although attempts have made to review the note for such errors, some may still exist.    Electronically signed by   Diann Ambrose   Internal Medicine Hospitalist  On 12/23/2020  At 11:36 AM

## 2020-12-25 LAB — CULTURE, BLOOD 2: NORMAL

## 2020-12-27 LAB — BLOOD CULTURE, ROUTINE: NORMAL

## 2020-12-28 NOTE — PROGRESS NOTES
Physician Progress Note      Bala Guevara  CSN #:                  [id number]  :                       1945  ADMIT DATE:       2020 12:29 AM  DISCH DATE:        2020 1:09 PM  RESPONDING  PROVIDER #:        Ailyn Bustos MD          QUERY TEXT:    Pt admitted with LBBB & NSTEMI. Noted documentation of Sepsis on ER MD note   and CTS consult  If possible, please document in progress notes and discharge   summary:      The medical record reflects the following:  Risk Factors: UTI  Clinical Indicators: Lactic Acid 2.5, WBC normal, HR 25-27, EMS say febrile,    Max t 99.1  Treatment: IV Rocephin, IVF LR bolus 1L  Thanks, Melody Patino, BSN  Options provided:  -- Sepsis confirmed present on admission  -- ***(Dx) ruled out  -- Other - I will add my own diagnosis  -- Disagree - Not applicable / Not valid  -- Disagree - Clinically unable to determine / Unknown  -- Refer to Clinical Documentation Reviewer    PROVIDER RESPONSE TEXT:    The diagnosis of *** was ruled out.     Query created by: Mukesh Aceves on 2020 11:20 AM      Electronically signed by:  Ailyn Bustos MD 2020 12:42 PM

## 2020-12-28 NOTE — CONSULTS
Cardiac Rehab MI/PTCA/Stent education packet was sent to the patient's address on record. Handouts included were titled; \"Home Instructions Following a Cardiac Event\", \"Cardiac Home Exercise Program - Phase I\", \"Risk Factors for Heart Disease and Stroke\" and \"Cardiac Diet/Low Cholesterol\". Patient was instructed to contact [facility] or [facility] for the opportunity to enroll in Phase II Outpatient Cardiac Rehab.

## 2020-12-28 NOTE — PROGRESS NOTES
Physician Progress Note      Ty Ann  CSN #:                  [id number]  :                       1945  ADMIT DATE:       2020 12:29 AM  DISCH DATE:        2020 1:09 PM  RESPONDING  PROVIDER #:        Abel Chung MD          QUERY TEXT:    Pt admitted with LBBB & NSTEMI. Noted documentation of Sepsis on ER MD note   and CTS consult  If possible, please document in progress notes and discharge   summary:      The medical record reflects the following:  Risk Factors: UTI  Clinical Indicators: Lactic Acid 2.5, WBC normal, HR 25-27, EMS say febrile,    Max t 99.1  Treatment: IV Rocephin, IVF LR bolus 1L  Thanks, MARCUS BarbourN  Options provided:  -- Sepsis confirmed present on admission  -- Sepsis ruled out  -- Other - I will add my own diagnosis  -- Disagree - Not applicable / Not valid  -- Disagree - Clinically unable to determine / Unknown  -- Refer to Clinical Documentation Reviewer    PROVIDER RESPONSE TEXT:    The diagnosis of Sepsis was ruled out.     Query created by: Yuliet Arauz on 2020 4:04 PM      Electronically signed by:  Abel Chung MD 2020 4:18 PM

## 2021-01-19 ENCOUNTER — OFFICE VISIT (OUTPATIENT)
Dept: CARDIOLOGY CLINIC | Age: 76
End: 2021-01-19
Payer: MEDICARE

## 2021-01-19 VITALS
HEART RATE: 72 BPM | BODY MASS INDEX: 25.72 KG/M2 | WEIGHT: 131 LBS | SYSTOLIC BLOOD PRESSURE: 124 MMHG | DIASTOLIC BLOOD PRESSURE: 80 MMHG | OXYGEN SATURATION: 100 % | HEIGHT: 60 IN

## 2021-01-19 DIAGNOSIS — I10 ESSENTIAL HYPERTENSION: ICD-10-CM

## 2021-01-19 DIAGNOSIS — I25.10 CORONARY ARTERY DISEASE INVOLVING NATIVE CORONARY ARTERY OF NATIVE HEART WITHOUT ANGINA PECTORIS: ICD-10-CM

## 2021-01-19 DIAGNOSIS — E78.5 HYPERLIPIDEMIA, UNSPECIFIED HYPERLIPIDEMIA TYPE: Primary | ICD-10-CM

## 2021-01-19 PROBLEM — N39.0 UTI (URINARY TRACT INFECTION): Status: RESOLVED | Noted: 2020-12-20 | Resolved: 2021-01-19

## 2021-01-19 PROBLEM — R77.8 ELEVATED TROPONIN: Status: RESOLVED | Noted: 2020-12-20 | Resolved: 2021-01-19

## 2021-01-19 PROCEDURE — 1123F ACP DISCUSS/DSCN MKR DOCD: CPT | Performed by: NURSE PRACTITIONER

## 2021-01-19 PROCEDURE — 99213 OFFICE O/P EST LOW 20 MIN: CPT | Performed by: NURSE PRACTITIONER

## 2021-01-19 PROCEDURE — G8400 PT W/DXA NO RESULTS DOC: HCPCS | Performed by: NURSE PRACTITIONER

## 2021-01-19 PROCEDURE — 3017F COLORECTAL CA SCREEN DOC REV: CPT | Performed by: NURSE PRACTITIONER

## 2021-01-19 PROCEDURE — 1036F TOBACCO NON-USER: CPT | Performed by: NURSE PRACTITIONER

## 2021-01-19 PROCEDURE — 4040F PNEUMOC VAC/ADMIN/RCVD: CPT | Performed by: NURSE PRACTITIONER

## 2021-01-19 PROCEDURE — G8484 FLU IMMUNIZE NO ADMIN: HCPCS | Performed by: NURSE PRACTITIONER

## 2021-01-19 PROCEDURE — 1090F PRES/ABSN URINE INCON ASSESS: CPT | Performed by: NURSE PRACTITIONER

## 2021-01-19 PROCEDURE — 1111F DSCHRG MED/CURRENT MED MERGE: CPT | Performed by: NURSE PRACTITIONER

## 2021-01-19 PROCEDURE — G8427 DOCREV CUR MEDS BY ELIG CLIN: HCPCS | Performed by: NURSE PRACTITIONER

## 2021-01-19 PROCEDURE — G8417 CALC BMI ABV UP PARAM F/U: HCPCS | Performed by: NURSE PRACTITIONER

## 2021-01-19 RX ORDER — HYDROCODONE BITARTRATE AND ACETAMINOPHEN 5; 325 MG/1; MG/1
1 TABLET ORAL NIGHTLY
Status: ON HOLD | COMMUNITY
End: 2021-06-23 | Stop reason: SDUPTHER

## 2021-01-19 RX ORDER — INSULIN GLARGINE 100 [IU]/ML
30 INJECTION, SOLUTION SUBCUTANEOUS EVERY MORNING
COMMUNITY

## 2021-01-19 RX ORDER — MELOXICAM 7.5 MG/1
7.5 TABLET ORAL DAILY
Status: ON HOLD | COMMUNITY
End: 2021-06-23 | Stop reason: HOSPADM

## 2021-01-19 RX ORDER — HYDROCHLOROTHIAZIDE 25 MG/1
25 TABLET ORAL DAILY
COMMUNITY

## 2021-01-19 RX ORDER — ASPIRIN 81 MG/1
81 TABLET ORAL DAILY
Qty: 90 TABLET | Refills: 3 | Status: SHIPPED | OUTPATIENT
Start: 2021-01-19

## 2021-01-19 SDOH — HEALTH STABILITY: MENTAL HEALTH: HOW OFTEN DO YOU HAVE A DRINK CONTAINING ALCOHOL?: NEVER

## 2021-01-19 NOTE — PROGRESS NOTES
Dear  No primary care provider on file.,    Thank you for allowing me to participate in the care of Ms. Suni Black. She presents today at the [address] [place] [address]. As you know, Ms. Dustin Bradley is a 76 y.o. female with history of hypertension,  Diabetes, deafness, anxiety, depression, and CAD who presents with the chief complaint of hospital follow-up post stent placement. She was admitted to [facility] on 12/20 after being found hypotensive at the nursing home. She also been having weakness and not getting around up as much she used to. In the ER she was found to be hypoxic and elevated troponin. She was consulted by Dr. Christopher Galindo and an echo was ordered. Her troponin continued to elevate and she underwent a heart cath on 12/21. She had 3 drug-eluting stents placed to the proximal right coronary artery. She was also found to have lesions in the LAD diagonal.  There is no revascularization done to that at that time. She started on aspirin and Plavix. She was discharged on 12/23 back to the nursing home. She is present today with her two sisters. They are unable to see her while she is in the nursing home. She is non-verbal and unable to tell if she has any symptoms. She has not been taking an ASA 81 mg according to the list of medications provided by the nursing home. She has been taking the Plavix. The sisters want to know if she needs more stents placed. PCP follows labs and lipids. Review of Systems   Constitutional: Negative for fever, chills, diaphoresis, activity change, appetite change, fatigue and unexpected weight change. Eyes: Negative for photophobia, pain, redness and visual disturbance. Respiratory: Negative for apnea, cough, chest tightness, shortness of breath, wheezing and stridor. Cardiovascular: Negative for chest pain, palpitations and leg swelling. Gastrointestinal: Negative for abdominal distention. Genitourinary: Negative for dysuria, urgency and frequency. Musculoskeletal: Negative for myalgias, arthralgias and gait problem. Skin: Negative for color change, pallor, rash and wound. Neurological: Negative for dizziness, tremors, speech difficulty, weakness and numbness. Hematological: Does not bruise/bleed easily. Psychiatric/Behavioral: Negative.         Past Medical History:   Diagnosis Date    Anxiety     Arthritis     Deaf     [facility]     Diabetes mellitus ([facility]     Dizziness     Dysphagia     Gastro-esophageal reflux     Giddiness     Hypertension     Lack of coordination     Major depressive disorder     Nonspeaking deaf     Pyelonephritis     Schizoaffective disorder ([facility]     Severe intellectual disabilities        Past Surgical History:   Procedure Laterality Date    EYE SURGERY Bilateral        Family History   Problem Relation Age of Onset    Heart Disease Father     Diabetes type 2  Sister        Social History     Socioeconomic History    Marital status: Single     Spouse name: Not on file    Number of children: Not on file    Years of education: Not on file    Highest education level: Not on file   Occupational History    Not on file   Social Needs    Financial resource strain: Not on file    Food insecurity     Worry: Not on file     Inability: Not on file    Transportation needs     Medical: Not on file     Non-medical: Not on file   Tobacco Use    Smoking status: Never Smoker    Smokeless tobacco: Never Used   Substance and Sexual Activity    Alcohol use: Never     Frequency: Never    Drug use: Never    Sexual activity: Never   Lifestyle    Physical activity     Days per week: Not on file     Minutes per session: Not on file    Stress: Not on file   Relationships    Social connections     Talks on phone: Not on file     Gets together: Not on file     Attends Spiritism service: Not on file     Active member of club or organization: Not on file Attends meetings of clubs or organizations: Not on file     Relationship status: Not on file    Intimate partner violence     Fear of current or ex partner: Not on file     Emotionally abused: Not on file     Physically abused: Not on file     Forced sexual activity: Not on file   Other Topics Concern    Not on file   Social History Narrative    Not on file       No Known Allergies      Current Outpatient Medications:     insulin glargine (BASAGLAR KWIKPEN) 100 UNIT/ML injection pen, Inject 100 Units into the skin nightly, Disp: , Rfl:     HYDROcodone-acetaminophen (NORCO) 5-325 MG per tablet, Take 1 tablet by mouth every 6 hours as needed for Pain., Disp: , Rfl:     meloxicam (MOBIC) 7.5 MG tablet, Take 7.5 mg by mouth daily, Disp: , Rfl:     hydroCHLOROthiazide (HYDRODIURIL) 25 MG tablet, Take 25 mg by mouth daily, Disp: , Rfl:     atorvastatin (LIPITOR) 40 MG tablet, Take 1 tablet by mouth nightly, Disp: 30 tablet, Rfl: 3    miconazole (MICOTIN) 2 % powder, Apply topically 2 times daily. , Disp: 45 g, Rfl: 1    clopidogrel (PLAVIX) 75 MG tablet, Take 1 tablet by mouth daily, Disp: 30 tablet, Rfl: 3    insulin lispro (HUMALOG) 100 UNIT/ML injection vial, Inject 7 Units into the skin, Disp: , Rfl:     sertraline (ZOLOFT) 100 MG tablet, Take 100 mg by mouth daily, Disp: , Rfl:     risperiDONE (RISPERDAL) 1 MG tablet, Take 1 mg by mouth nightly, Disp: , Rfl:     risperiDONE (RISPERDAL) 0.25 MG tablet, Take 0.25 mg by mouth 2 times daily, Disp: , Rfl:     pantoprazole sodium (PROTONIX) 40 MG PACK packet, Take 40 mg by mouth every morning (before breakfast), Disp: , Rfl:     docusate sodium (COLACE) 100 MG capsule, Take 100 mg by mouth 2 times daily, Disp: , Rfl:     lactulose (CHRONULAC) 10 GM/15ML solution, Take 20 g by mouth daily, Disp: , Rfl:     simethicone (MYLICON) 80 MG chewable tablet, Take 80 mg by mouth every 6 hours as needed for Flatulence, Disp: , Rfl:   metoprolol succinate (TOPROL XL) 50 MG extended release tablet, Take 50 mg by mouth daily, Disp: , Rfl:     metFORMIN (GLUCOPHAGE-XR) 500 MG extended release tablet, Take 1,000 mg by mouth every 4 hours , Disp: , Rfl:     clonazePAM (KLONOPIN) 0.5 MG tablet, Take 1 tablet by mouth 3 times daily as needed for Anxiety for up to 3 days. , Disp: 9 tablet, Rfl: 0    PE:  Vitals:    01/19/21 1437   BP: 124/80   Pulse: 72   SpO2: 100%       Estimated body mass index is 25.58 kg/m² as calculated from the following:    Height as of this encounter: 5' (1.524 m). Weight as of this encounter: 131 lb (59.4 kg). Constitutional:  She appears well-developed and well-nourished in no acute distress. Neck:  Neck supple without JVD present. No trachea deviation present. No thyromegaly present. Eyes:Conjunctivae and EOM are normal. Pupils equal and reactive to light. ENT:conjunctiva and lids are normal, ears and nose appear normal.  Cardiovascular: Normal rate, Normal rhythm, normal heart sounds. No murmur ascultated. No gallop and no friction rub. No carotid bruits. No peripheral edema. Pulmonary/Chest:  Lungs clear to auscultation bilaterally without evidence of respiratory distress. She without wheezes. She without rales or ronchi. Musculoskeletal: Normal range of motion. Gait is normal. Head is normocephalic and atraumatic. Skin: Skin is warm and dry without rash or pallor. Psychiatric: She is unable to communicate. Lab Results   Component Value Date    CREATININE 0.7 12/23/2020    CREATININE 0.8 12/22/2020    CREATININE 1.1 12/20/2020    HGB 10.2 12/23/2020    HGB 9.2 12/22/2020    HGB 10.6 12/20/2020    PROBNP 2,175 12/20/2020       TTE- 12/20/20  Mitral valve leaflets are mildly thickened with preserved leaflet   mobility. Mild mitral regurgitation is present. Mildly thickened aortic valve leaflets with preserved leaflet mobility.    Tricuspid valve is structurally normal.

## 2021-01-19 NOTE — PATIENT INSTRUCTIONS
Start ASA 81 mg daily. She cannot miss a dose of ASA or Plavix for any reason   Do a fasting Lipid Panel and AST and ALT in about a month     Coronary Artery Disease   (CAD; Coronary Atherosclerosis; Silent MI; Coronary Heart Disease; Ischemic Heart Disease; Atherosclerosis of the Coronary Arteries)     Definition   Coronary arteries bring oxygen rich blood to the heart muscle. Coronary artery disease (CAD) is blockage of these arteries. If the blockage is complete, areas of the heart muscle may be damaged. In severe case the heart muscle dies. This can lead to a heart attack, also known as a myocardial infarction (MI). Coronary artery disease is the most common form of heart disease. It is the leading cause of death worldwide. Causes include: Thickening of the walls of the arteries feeding the heart muscle   Accumulation of fatty plaques within the coronary arteries   Sudden spasm of a coronary artery   Narrowing of the coronary arteries   Inflammation within the coronary arteries   Development of a blood clot within the coronary arteries that blocks blood flow      Major risk factors include:   Sex: male (men have a greater risk of heart attack than women)   Age: 39 and older for men, 54 and older for women   Heredity: strong family history of heart disease   Obesity and being overweight   Smoking   High blood pressure   Sedentary lifestylePoor fitness can also increase your risk of CAD and premature death. High cholesterol (specifically, high LDL cholesterol, and low HDL cholesterol)   Diabetes   Metabolic syndrome (combination of high blood pressure, abdominal obesity, and insulin resistance)     Other risk factors may include:   Sleep Apnea  Stress   Excessive alcohol use   Depression   A diet that is high in saturated fat, trans fat, cholesterol, and/or caloriesDrinking sugary beverages on a regular basis may increase your risk of CAD. Symptoms   CAD may progress without any symptoms. Angina is chest pain that comes and goes. It often has a squeezing or pressure-like quality. It may radiate into the shoulder(s), arm(s), or jaw. Angina usually lasts for about 2-10 minutes. It is often relieved with rest. Angina can be triggered by:   Exercise or exertion   Emotional stress   Cold weather   A large meal   Chest pain may indicate more serious unstable angina or a heart attack if: It is unrelieved by rest or nitroglycerin   Severe angina   Angina that begins at rest (with no activity)   Angina that lasts more than 15 minutes   Accompanying symptoms may include:   Shortness of breath   Sweating   Nausea   Weakness   Immediate medical attention is needed for unstable angina. CAD in women may cause less classic chest pain. It is likely to start with shortness of breath and fatigue. Diagnosis   If you go to the emergency room with chest pain, some tests will be done right away. The tests will attempt to see if you are having angina or a heart attack. If you have a stable pattern of angina, other tests may be done to determine the severity of your disease. The doctor will ask about your symptoms and medical history. A physical exam will be done.    Tests may include:   Blood tests to look for certain substances in the blood called troponins which help the doctor determine if you are having a heart attack   Electrocardiogram (ECG, EKG) records the heart's activity by measuring electrical currents through the heart muscle, and can reveal evidence of past heart attacks, acute heart attacks, and heart rhythm problems   Echocardiogram uses high-frequency sound waves (ultrasound) to examine the size, shape, and motion of the heart, giving information about the structure and function of the heart   Exercise stress test records the heart's electrical activity during increased physical activity Nuclear stress test the heart is observed while exercising and radioactive material highlights impaired blood flow to help locate problem areas   Coronary calcium scoring a type of x-ray called a CAT scan that uses a computer to look for the presence of calcium in the heart arteries   Coronary angiography x-rays taken after a dye is injected into the arteries to allows the doctor to look for abnormalities in the arteries   Treatment   Treatment may include:   Nitroglycerin   This medicine is usually given during an attack of angina. It can be given as a tablet that dissolves under the tongue or as a spray. Longer-lasting types can be used to prevent angina before an activity known to cause it. These may be given as pills or applied as patches or ointments. Blood-Thinning Medications   A small, daily dose of aspirin has been shown to decrease the risk of heart attack. Ask your doctor before taking aspirin daily. Warfarin (Coumadin)   Ticlopidine (Ticlid)   Clopidogrel (Plavix)   Beta-Blockers, Calcium-Channel Blockers, and ACE-Inhibitors   These may help prevent angina. In some cases, they may lower the risk of heart attack. Medications to Lower Cholesterol   Medicines, like statins, are often prescribed to people who have CAD. Statins (eg, atorvastatin [Lipitor]) lower cholesterol levels, which can help to prevent CAD events.    Revascularization   Patients with severe blockages in their coronary arteries may benefit from procedures to immediately improve blood flow to the heart muscle:   Percutaneous coronary interventions (PCI)such as balloon angioplasty , in some cases, a wire mesh stent is placed to hold the artery open   Coronary artery bypass grafting (CABG) segments of vessels are taken from other areas of the body and are sewn into the heart arteries to reroute blood flow around blockages Some studies have shown that CABG may be more effective than PCI. Lifestyle changes and intensive medicine may also be just as effective as PCI. Options for Refractory Angina   For patients who are not candidates for revascularization procedures but have continued angina despite medicine, options include:   Enhanced external counterpulsation (EECP) large air bags are inflated around the legs in tune with the heart beat. The patient receives 5 one-hour treatments per week for seven weeks. This has been shown to reduce angina and may improve symptom-free exercise duration. Transmyocardial revascularization (TMR) surgical procedure done with laser to reduce chest pain. Researchers are also studying gene therapy as a possible treatment. Prevention   To reduce your risk of getting coronary artery disease:   Maintain a healthy weight. Eat a heart healthy diet that is low in saturated fat , red meat and processed meats, and rich in whole grain , fruits, and vegetables . Begin a safe exercise program with the advice of your doctor. If you smoke, quit . Treat your high blood pressure and/or diabetes. Treat high cholesterol or triglycerides. Ask your doctor about taking a low-dose aspirin every day. In certain patients, taking rosuvastatin (Crestor) may be another option. Talk to your doctor. Hypertension  (High Blood Pressure)  Most people with high blood pressure (hypertension) have no symptoms. High blood pressure can be a dangerous problem. Hypertension is dangerous because you may have it and not know it. High blood pressure may mean that your heart needs to work harder to pump blood. Your blood pressure is measured with 2 numbers. The first number is when your heart flexes (contracts), and the second number is when your heart relaxes. The higher the numbers are, the more you are at risk for problems. This condition is diagnosed with blood tests. These tests measure the levels of lipids in the blood. The National Cholesterol Education Program advises that you have your lipids checked at least once every five years, starting at age 21. Also, the American Academy of Pediatrics recommends lipid screening for children at risk (eg, a family history of hyperlipidemia). Testing may consist of a fasting blood test for:   Total cholesterol   LDL (bad cholesterol)   HDL (good cholesterol)   Triglycerides   Your doctor may recommend more frequent or earlier testing if you have:   Family history of hyperlipidemia   Risk factor or disease that may cause hyperlipidemia   Complication that may result from hyperlipidemia   Treatment   Treatment is not only aimed at correcting your cholesterol levels, but also at lowering your overall risk for heart disease and strokes. Diet Changes   Eat a diet low in total fat, saturated fat, and cholesterol . Reduce or eliminate the amount of alcohol you drink. Eat more high-fiber foods. Lifestyle Changes   If you are overweight, lose weight . If you smoke, quit . Exercise regularly . Talk to you doctor before starting an exercise program. Stanley Car may already have hardening of the arteries or heart disease. These conditions increase your risk of having a heart attack while exercising. Make sure other medical conditions such as high blood pressure and diabetes are being treated and controlled. Medications   There are a number of drugs available, such as statins , to treat this condition and help lower your risk for heart disease. Talk to your doctor. Statins have been shown to reduce mortality (death), heart attacks , and stroke. These medicines are best used as additions to diet and exercise and should not replace healthy lifestyle changes.    Prevention   To help reduce your chance of getting hyperlipidemia, take the following steps: Starting at age 21, get cholesterol tests. Eat a diet low in total fat, saturated fat, and cholesterol. If you smoke, quit. Drink alcohol in moderation (two drinks per day for men, one drink per day for women). If you are overweight, lose weight. Exercise regularly. Talk with your doctor first.   If you have diabetes , control your blood sugar. Talk to your doctor about medications you are taking. They may have side effects that cause hyperlipidemia.      Last Reviewed: September 2010 Radha Morelos DO   Updated: 11/9/2010

## 2021-01-26 ENCOUNTER — TELEPHONE (OUTPATIENT)
Dept: CARDIOLOGY CLINIC | Age: 76
End: 2021-01-26

## 2021-01-26 NOTE — TELEPHONE ENCOUNTER
----- Message from Sanchez Armstrong MD sent at 1/23/2021  7:58 PM CST -----  Regarding: RE: Cath  Thanks you for seeing her, I reviewed this case, she presented with NSTEMI and I was consulted during hospitalization, RCA (Culprit) was revascularized after she was turned down by CT surgery, I know the patient is limited on her ability to communicate verbally if she had any symptoms, she still have significant lesions on the left side and if family are willing to proceed, please go ahead and scheduled her for elective PCI with me in the coming weeks, Laurence Joyner will help arrange for that. I saw in your office note that you wanted Almas to review her films, not sure why? It is not his patient     -AA      ----- Message -----  From: MEKA Lambert  Sent: 1/20/2021  12:41 PM CST  To: Sanchez Armstrong MD  Subject: Cath                                             I saw this patient in follow-up yesterday. You did a cath on her on December 21 and put 3 overlapping drug-eluting stents to the proximal right coronary artery. She still has an 90% ostial diagonal lesion to the LAD and 70% LAD disease after the bifurcation of the LAD and first diagonal segment. You put in your recommendations follow-up in 1 month may need right vascularization of the LAD diagonal lesions if continues with symptoms. She is deaf and unable to communicate. She had no symptoms prior and was only brought to the ER due to hypotension and vomiting. She was found to have elevated troponins which prompted the cath. I saw her yesterday and her sisters were present. They are wanting to proceed with PCI if need be. I need your input on what needs to be done. Do you want me to schedule her to be seen in the office with you so you can discuss the risk of the procedure and whether it needs to be done?     Thanks,     Storm Jane

## 2021-01-26 NOTE — TELEPHONE ENCOUNTER
Called and spoke with patient's sister, have PCI scheduled for 3/1/21 at 80 with arrival of 900. Patient is to be NPO after midnight. Patient instructed to arrive through front entrance of hospital and make immediate left. Patient advised they can have one person with them but they both must wear a mask. Patient advised may take morning medications with sip of water. Also advised patient must have COVID testing completed on 2/25/21 anywhere from 800-1100 am at [facility]. Advised patient that they will be able to proceed with procedure as long as test results are negative. Patient made aware that if testing is not resulted evening prior to procedure that they may have to be rescheduled and possibly retested. Given instructions on where to go and to self quarantine between testing and procedure. Patient does not have IV dye allergy. Patient advised to hold metformin two days prior. Patient's sister verbally understood.

## 2021-02-23 NOTE — TELEPHONE ENCOUNTER
Patients sister left message wanting to know if pt could eat morning of procedure. Returned call and advised that pt is to be NPO after midnight, only sip of water for morning medicine. She then stated she had covid testing yesterday. Advised that your note said 2/25/21. She said that no one told her that date just that it needed to be a week before. Advised I would pass message long to you to discuss with her.  Sister can be reached at [phone]

## 2021-03-01 ENCOUNTER — HOSPITAL ENCOUNTER (OUTPATIENT)
Dept: CARDIAC CATH/INVASIVE PROCEDURES | Age: 76
Discharge: HOME | End: 2021-03-01
Attending: INTERNAL MEDICINE | Admitting: INTERNAL MEDICINE
Payer: MEDICARE

## 2021-03-01 VITALS
DIASTOLIC BLOOD PRESSURE: 77 MMHG | OXYGEN SATURATION: 95 % | SYSTOLIC BLOOD PRESSURE: 163 MMHG | WEIGHT: 135 LBS | HEART RATE: 95 BPM | TEMPERATURE: 96.6 F | BODY MASS INDEX: 26.5 KG/M2 | HEIGHT: 60 IN | RESPIRATION RATE: 20 BRPM

## 2021-03-01 LAB
ALBUMIN SERPL-MCNC: 4 G/DL (ref 3.5–5.2)
ALP BLD-CCNC: 116 U/L (ref 35–104)
ALT SERPL-CCNC: 13 U/L (ref 5–33)
ANION GAP SERPL CALCULATED.3IONS-SCNC: 11 MMOL/L (ref 7–19)
AST SERPL-CCNC: 21 U/L (ref 5–32)
BILIRUB SERPL-MCNC: 0.3 MG/DL (ref 0.2–1.2)
BUN BLDV-MCNC: 17 MG/DL (ref 8–23)
CALCIUM SERPL-MCNC: 9.6 MG/DL (ref 8.8–10.2)
CHLORIDE BLD-SCNC: 103 MMOL/L (ref 98–111)
CO2: 26 MMOL/L (ref 22–29)
CREAT SERPL-MCNC: 0.7 MG/DL (ref 0.5–0.9)
GFR AFRICAN AMERICAN: >59
GFR NON-AFRICAN AMERICAN: >60
GLUCOSE BLD-MCNC: 138 MG/DL (ref 74–109)
HCT VFR BLD CALC: 34.8 % (ref 37–47)
HEMOGLOBIN: 10.7 G/DL (ref 12–16)
MCH RBC QN AUTO: 24.2 PG (ref 27–31)
MCHC RBC AUTO-ENTMCNC: 30.7 G/DL (ref 33–37)
MCV RBC AUTO: 78.6 FL (ref 81–99)
PDW BLD-RTO: 14.4 % (ref 11.5–14.5)
PLATELET # BLD: 185 K/UL (ref 130–400)
PMV BLD AUTO: 9.6 FL (ref 9.4–12.3)
POTASSIUM SERPL-SCNC: 4.4 MMOL/L (ref 3.5–5)
RBC # BLD: 4.43 M/UL (ref 4.2–5.4)
SARS-COV-2, NAAT: NOT DETECTED
SODIUM BLD-SCNC: 140 MMOL/L (ref 136–145)
TOTAL PROTEIN: 7.8 G/DL (ref 6.6–8.7)
WBC # BLD: 5.4 K/UL (ref 4.8–10.8)

## 2021-03-01 PROCEDURE — 80053 COMPREHEN METABOLIC PANEL: CPT

## 2021-03-01 PROCEDURE — 6370000000 HC RX 637 (ALT 250 FOR IP): Performed by: INTERNAL MEDICINE

## 2021-03-01 PROCEDURE — C1894 INTRO/SHEATH, NON-LASER: HCPCS

## 2021-03-01 PROCEDURE — 36415 COLL VENOUS BLD VENIPUNCTURE: CPT

## 2021-03-01 PROCEDURE — 93454 CORONARY ARTERY ANGIO S&I: CPT | Performed by: INTERNAL MEDICINE

## 2021-03-01 PROCEDURE — C1887 CATHETER, GUIDING: HCPCS

## 2021-03-01 PROCEDURE — 85027 COMPLETE CBC AUTOMATED: CPT

## 2021-03-01 PROCEDURE — 6360000004 HC RX CONTRAST MEDICATION: Performed by: INTERNAL MEDICINE

## 2021-03-01 PROCEDURE — C1760 CLOSURE DEV, VASC: HCPCS

## 2021-03-01 PROCEDURE — 99152 MOD SED SAME PHYS/QHP 5/>YRS: CPT

## 2021-03-01 PROCEDURE — C1769 GUIDE WIRE: HCPCS

## 2021-03-01 PROCEDURE — 87635 SARS-COV-2 COVID-19 AMP PRB: CPT

## 2021-03-01 PROCEDURE — 6360000002 HC RX W HCPCS

## 2021-03-01 PROCEDURE — 93454 CORONARY ARTERY ANGIO S&I: CPT

## 2021-03-01 PROCEDURE — 2709999900 HC NON-CHARGEABLE SUPPLY

## 2021-03-01 PROCEDURE — 2500000003 HC RX 250 WO HCPCS

## 2021-03-01 PROCEDURE — 93005 ELECTROCARDIOGRAM TRACING: CPT | Performed by: INTERNAL MEDICINE

## 2021-03-01 PROCEDURE — 99153 MOD SED SAME PHYS/QHP EA: CPT

## 2021-03-01 PROCEDURE — 2580000003 HC RX 258: Performed by: INTERNAL MEDICINE

## 2021-03-01 RX ORDER — ONDANSETRON 2 MG/ML
4 INJECTION INTRAMUSCULAR; INTRAVENOUS EVERY 6 HOURS PRN
Status: DISCONTINUED | OUTPATIENT
Start: 2021-03-01 | End: 2021-03-01 | Stop reason: HOSPADM

## 2021-03-01 RX ORDER — SODIUM CHLORIDE 0.9 % (FLUSH) 0.9 %
10 SYRINGE (ML) INJECTION PRN
Status: DISCONTINUED | OUTPATIENT
Start: 2021-03-01 | End: 2021-03-01 | Stop reason: HOSPADM

## 2021-03-01 RX ORDER — SODIUM CHLORIDE 9 MG/ML
INJECTION, SOLUTION INTRAVENOUS CONTINUOUS
Status: DISCONTINUED | OUTPATIENT
Start: 2021-03-01 | End: 2021-03-01 | Stop reason: HOSPADM

## 2021-03-01 RX ORDER — PANTOPRAZOLE SODIUM 40 MG/1
TABLET, DELAYED RELEASE ORAL
COMMUNITY
Start: 2021-01-02

## 2021-03-01 RX ORDER — NITROGLYCERIN 0.4 MG/1
0.4 TABLET SUBLINGUAL EVERY 5 MIN PRN
Status: DISCONTINUED | OUTPATIENT
Start: 2021-03-01 | End: 2021-03-01 | Stop reason: HOSPADM

## 2021-03-01 RX ORDER — SODIUM CHLORIDE 0.9 % (FLUSH) 0.9 %
10 SYRINGE (ML) INJECTION EVERY 12 HOURS SCHEDULED
Status: DISCONTINUED | OUTPATIENT
Start: 2021-03-01 | End: 2021-03-01 | Stop reason: HOSPADM

## 2021-03-01 RX ORDER — ASPIRIN 325 MG
325 TABLET ORAL ONCE
Status: COMPLETED | OUTPATIENT
Start: 2021-03-01 | End: 2021-03-01

## 2021-03-01 RX ADMIN — ASPIRIN 325 MG: 325 TABLET, FILM COATED ORAL at 12:20

## 2021-03-01 RX ADMIN — SODIUM CHLORIDE: 9 INJECTION, SOLUTION INTRAVENOUS at 12:22

## 2021-03-01 RX ADMIN — IOPAMIDOL 80 ML: 612 INJECTION, SOLUTION INTRAVENOUS at 14:32

## 2021-03-01 NOTE — H&P
Dear  No primary care provider on file.,    Thank you for allowing me to participate in the care of Ms. Adriel Stacy. She presents today at the [address] [place] [address]. As you know, Ms. Erasto Costello is a 76 y.o. female with history of hypertension,  Diabetes, deafness, anxiety, depression, and CAD who presents with the chief complaint of hospital follow-up post stent placement. She was admitted to [facility] on 12/20 after being found hypotensive at the nursing home. She also been having weakness and not getting around up as much she used to. In the ER she was found to be hypoxic and elevated troponin. She was consulted by Dr. Madai Vallejo and an echo was ordered. Her troponin continued to elevate and she underwent a heart cath on 12/21. She had 3 drug-eluting stents placed to the proximal right coronary artery. She was also found to have lesions in the LAD diagonal.  There is no revascularization done to that at that time. She started on aspirin and Plavix. She was discharged on 12/23 back to the nursing home. She is present today with her two sisters. They are unable to see her while she is in the nursing home. She is non-verbal and unable to tell if she has any symptoms. She has not been taking an ASA 81 mg according to the list of medications provided by the nursing home. She has been taking the Plavix. The sisters want to know if she needs more stents placed. PCP follows labs and lipids. Review of Systems   Constitutional: Negative for fever, chills, diaphoresis, activity change, appetite change, fatigue and unexpected weight change. Eyes: Negative for photophobia, pain, redness and visual disturbance. Respiratory: Negative for apnea, cough, chest tightness, shortness of breath, wheezing and stridor. Cardiovascular: Negative for chest pain, palpitations and leg swelling. Gastrointestinal: Negative for abdominal distention. Genitourinary: Negative for dysuria, urgency and frequency. Musculoskeletal: Negative for myalgias, arthralgias and gait problem. Skin: Negative for color change, pallor, rash and wound. Neurological: Negative for dizziness, tremors, speech difficulty, weakness and numbness. Hematological: Does not bruise/bleed easily. Psychiatric/Behavioral: Negative.         Past Medical History:   Diagnosis Date    Anxiety     Arthritis     Deaf     [facility]     Diabetes mellitus ([facility]     Dizziness     Dysphagia     Gastro-esophageal reflux     Giddiness     Hypertension     Lack of coordination     Major depressive disorder     Nonspeaking deaf     Pyelonephritis     Schizoaffective disorder ([facility]     Severe intellectual disabilities        Past Surgical History:   Procedure Laterality Date    EYE SURGERY Bilateral        Family History   Problem Relation Age of Onset    Heart Disease Father     Diabetes type 2  Sister        Social History     Socioeconomic History    Marital status: Single     Spouse name: Not on file    Number of children: Not on file    Years of education: Not on file    Highest education level: Not on file   Occupational History    Not on file   Social Needs    Financial resource strain: Not on file    Food insecurity     Worry: Not on file     Inability: Not on file    Transportation needs     Medical: Not on file     Non-medical: Not on file   Tobacco Use    Smoking status: Never Smoker    Smokeless tobacco: Never Used   Substance and Sexual Activity    Alcohol use: Never     Frequency: Never    Drug use: Never    Sexual activity: Never   Lifestyle    Physical activity     Days per week: Not on file     Minutes per session: Not on file    Stress: Not on file   Relationships    Social connections     Talks on phone: Not on file     Gets together: Not on file     Attends Gnosticist service: Not on file     Active member of club or organization: Not on file     Attends meetings of clubs or organizations: Not on file     Relationship status: Not on file    Intimate partner violence     Fear of current or ex partner: Not on file     Emotionally abused: Not on file     Physically abused: Not on file     Forced sexual activity: Not on file   Other Topics Concern    Not on file   Social History Narrative    Not on file       No Known Allergies    No current outpatient medications on file. PE:  There were no vitals filed for this visit. Estimated body mass index is 25.58 kg/m² as calculated from the following:    Height as of 1/19/21: 5' (1.524 m). Weight as of 1/19/21: 131 lb (59.4 kg). Constitutional:  She appears well-developed and well-nourished in no acute distress. Neck:  Neck supple without JVD present. No trachea deviation present. No thyromegaly present. Eyes:Conjunctivae and EOM are normal. Pupils equal and reactive to light. ENT:conjunctiva and lids are normal, ears and nose appear normal.  Cardiovascular: Normal rate, Normal rhythm, normal heart sounds. No murmur ascultated. No gallop and no friction rub. No carotid bruits. No peripheral edema. Pulmonary/Chest:  Lungs clear to auscultation bilaterally without evidence of respiratory distress. She without wheezes. She without rales or ronchi. Musculoskeletal: Normal range of motion. Gait is normal. Head is normocephalic and atraumatic. Skin: Skin is warm and dry without rash or pallor. Psychiatric: She is unable to communicate. Lab Results   Component Value Date    CREATININE 0.7 12/23/2020    CREATININE 0.8 12/22/2020    CREATININE 1.1 12/20/2020    HGB 10.2 12/23/2020    HGB 9.2 12/22/2020    HGB 10.6 12/20/2020    PROBNP 2,175 12/20/2020       TTE- 12/20/20  Mitral valve leaflets are mildly thickened with preserved leaflet   mobility. Mild mitral regurgitation is present. Mildly thickened aortic valve leaflets with preserved leaflet mobility.    Tricuspid valve is structurally normal.   Mild tricuspid regurgitation with estimated RVSP of 31 mm Hg. Normal left ventricular size with preserved LV function and an estimated   ejection fraction of approximately 55-60%. Impaired relaxation compatible with diastolic dysfunction. ( reversed E/A   ratio)   Normal left ventricular wall thickness. No regional wall motion abnormalities. Cath- 12/21/20  Patient tolerated the procedure well. Successful PCI using 3 overlapping drug Eluting Stents of the proximal   Right Coronary Artery with excellent angiographic outcome      Recommendations      Medical management. Aggressive risk factor management. Patient need to continue with aspirin and Plavix for at least 1 year   Follow-up with me in the office after 1 month, she may need   revascularization of the LAD diagonal lesions if she continued to have   symptoms     Assessment, Recommendations, & Plan:  76 y.o. female with CAD, HTN, & HLD    CAD- On Toprol, Plavix, & Lipitor. I have given them a script for ASA 81 mg daily. I stressed the importance of not missing a dose of ASA or Plavix for any reason. I have discussed cath report with Dr. Jacqui Kwan. I will have Dr. Cari King review cath films to see if PCI needs to be done on LAD. Patient is unable to communicate if she has symptoms. Will call sister with recommendation. HTN- Controlled, no changes    HLD-on Lipitor in the hospital.  Would recommend a fasting lipid panel and AST and ALT in approximately a month. Orders given to sisters to give to nursing home. Disposition - RTC in 6 weeks or sooner if needed      Please do not hesitate to contact me for any questions or concerns. Sincerely yours,    MEKA Alvares  -------------------------------  Addendum     Risks, benefits, alternatives of Heart cath/PCI discussed with the patient   I explained the procedure to the patient in detail and fully informed consent obtained.   Acceptable Mallampati score  Consent for moderate conscious sedation  ASA 3      Abdelkawinnie Carlos Hutton MD, [facility]  Interventional Cardiologist, Endovascular Specialist   Medical Director, Yolanda Wahl

## 2021-03-03 LAB
EKG P AXIS: 63 DEGREES
EKG P-R INTERVAL: 150 MS
EKG Q-T INTERVAL: 418 MS
EKG QRS DURATION: 122 MS
EKG QTC CALCULATION (BAZETT): 449 MS
EKG T AXIS: 67 DEGREES

## 2021-06-22 ENCOUNTER — APPOINTMENT (OUTPATIENT)
Dept: CT IMAGING | Age: 76
End: 2021-06-22
Payer: MEDICARE

## 2021-06-22 ENCOUNTER — HOSPITAL ENCOUNTER (OUTPATIENT)
Age: 76
Setting detail: OBSERVATION
Discharge: HOME | End: 2021-06-23
Attending: EMERGENCY MEDICINE | Admitting: HOSPITALIST
Payer: MEDICARE

## 2021-06-22 DIAGNOSIS — G89.29 OTHER CHRONIC PAIN: ICD-10-CM

## 2021-06-22 DIAGNOSIS — N39.0 URINARY TRACT INFECTION WITHOUT HEMATURIA, SITE UNSPECIFIED: ICD-10-CM

## 2021-06-22 DIAGNOSIS — F51.05 INSOMNIA DUE TO ANXIETY AND FEAR: ICD-10-CM

## 2021-06-22 DIAGNOSIS — F40.9 INSOMNIA DUE TO ANXIETY AND FEAR: ICD-10-CM

## 2021-06-22 DIAGNOSIS — R55 SYNCOPE AND COLLAPSE: ICD-10-CM

## 2021-06-22 DIAGNOSIS — R10.9 ABDOMINAL PAIN, UNSPECIFIED ABDOMINAL LOCATION: Primary | ICD-10-CM

## 2021-06-22 DIAGNOSIS — E83.42 HYPOMAGNESEMIA: ICD-10-CM

## 2021-06-22 PROBLEM — I25.9 CHEST PAIN DUE TO MYOCARDIAL ISCHEMIA: Status: ACTIVE | Noted: 2021-06-22

## 2021-06-22 LAB
ALBUMIN SERPL-MCNC: 4 G/DL (ref 3.5–5.2)
ALP BLD-CCNC: 111 U/L (ref 35–104)
ALT SERPL-CCNC: 12 U/L (ref 5–33)
ANION GAP SERPL CALCULATED.3IONS-SCNC: 11 MMOL/L (ref 7–19)
AST SERPL-CCNC: 18 U/L (ref 5–32)
BACTERIA: NEGATIVE /HPF
BASOPHILS ABSOLUTE: 0 K/UL (ref 0–0.2)
BASOPHILS RELATIVE PERCENT: 0.3 % (ref 0–1)
BILIRUB SERPL-MCNC: <0.2 MG/DL (ref 0.2–1.2)
BILIRUBIN URINE: NEGATIVE
BLOOD, URINE: NEGATIVE
BUN BLDV-MCNC: 25 MG/DL (ref 8–23)
CALCIUM SERPL-MCNC: 9.7 MG/DL (ref 8.8–10.2)
CHLORIDE BLD-SCNC: 96 MMOL/L (ref 98–111)
CHOLESTEROL, TOTAL: 106 MG/DL (ref 160–199)
CLARITY: CLEAR
CO2: 30 MMOL/L (ref 22–29)
COLOR: YELLOW
CREAT SERPL-MCNC: 0.9 MG/DL (ref 0.5–0.9)
CRYSTALS, UA: ABNORMAL /HPF
EOSINOPHILS ABSOLUTE: 0.1 K/UL (ref 0–0.6)
EOSINOPHILS RELATIVE PERCENT: 0.9 % (ref 0–5)
EPITHELIAL CELLS, UA: 1 /HPF (ref 0–5)
GFR AFRICAN AMERICAN: >59
GFR NON-AFRICAN AMERICAN: >60
GLUCOSE BLD-MCNC: 179 MG/DL (ref 70–99)
GLUCOSE BLD-MCNC: 230 MG/DL (ref 74–109)
GLUCOSE URINE: NEGATIVE MG/DL
HBA1C MFR BLD: 8 % (ref 4–6)
HCT VFR BLD CALC: 33.1 % (ref 37–47)
HDLC SERPL-MCNC: 39 MG/DL (ref 65–121)
HEMOGLOBIN: 10.2 G/DL (ref 12–16)
HYALINE CASTS: 0 /HPF (ref 0–8)
IMMATURE GRANULOCYTES #: 0 K/UL
KETONES, URINE: ABNORMAL MG/DL
LDL CHOLESTEROL CALCULATED: 37 MG/DL
LEUKOCYTE ESTERASE, URINE: ABNORMAL
LIPASE: 78 U/L (ref 13–60)
LYMPHOCYTES ABSOLUTE: 1.1 K/UL (ref 1.1–4.5)
LYMPHOCYTES RELATIVE PERCENT: 18.9 % (ref 20–40)
MAGNESIUM: 1.2 MG/DL (ref 1.6–2.4)
MCH RBC QN AUTO: 22.4 PG (ref 27–31)
MCHC RBC AUTO-ENTMCNC: 30.8 G/DL (ref 33–37)
MCV RBC AUTO: 72.7 FL (ref 81–99)
MONOCYTES ABSOLUTE: 0.5 K/UL (ref 0–0.9)
MONOCYTES RELATIVE PERCENT: 7.7 % (ref 0–10)
NEUTROPHILS ABSOLUTE: 4.2 K/UL (ref 1.5–7.5)
NEUTROPHILS RELATIVE PERCENT: 72 % (ref 50–65)
NITRITE, URINE: NEGATIVE
PDW BLD-RTO: 17.2 % (ref 11.5–14.5)
PERFORMED ON: ABNORMAL
PH UA: 5.5 (ref 5–8)
PHOSPHORUS: 3.2 MG/DL (ref 2.5–4.5)
PLATELET # BLD: 181 K/UL (ref 130–400)
PMV BLD AUTO: 9.3 FL (ref 9.4–12.3)
POTASSIUM SERPL-SCNC: 3.9 MMOL/L (ref 3.5–5)
PROTEIN UA: NEGATIVE MG/DL
RBC # BLD: 4.55 M/UL (ref 4.2–5.4)
RBC UA: 4 /HPF (ref 0–4)
SARS-COV-2, NAAT: NOT DETECTED
SODIUM BLD-SCNC: 137 MMOL/L (ref 136–145)
SPECIFIC GRAVITY UA: 1.02 (ref 1–1.03)
TOTAL PROTEIN: 7.6 G/DL (ref 6.6–8.7)
TRIGL SERPL-MCNC: 151 MG/DL (ref 0–149)
TROPONIN: <0.01 NG/ML (ref 0–0.03)
TROPONIN: <0.01 NG/ML (ref 0–0.03)
TSH REFLEX FT4: 1.6 UIU/ML (ref 0.35–5.5)
UROBILINOGEN, URINE: 0.2 E.U./DL
WBC # BLD: 5.8 K/UL (ref 4.8–10.8)
WBC UA: 15 /HPF (ref 0–5)

## 2021-06-22 PROCEDURE — 6360000004 HC RX CONTRAST MEDICATION: Performed by: EMERGENCY MEDICINE

## 2021-06-22 PROCEDURE — 96375 TX/PRO/DX INJ NEW DRUG ADDON: CPT

## 2021-06-22 PROCEDURE — 74177 CT ABD & PELVIS W/CONTRAST: CPT

## 2021-06-22 PROCEDURE — 36415 COLL VENOUS BLD VENIPUNCTURE: CPT

## 2021-06-22 PROCEDURE — 80053 COMPREHEN METABOLIC PANEL: CPT

## 2021-06-22 PROCEDURE — 71275 CT ANGIOGRAPHY CHEST: CPT

## 2021-06-22 PROCEDURE — 96365 THER/PROPH/DIAG IV INF INIT: CPT

## 2021-06-22 PROCEDURE — 85025 COMPLETE CBC W/AUTO DIFF WBC: CPT

## 2021-06-22 PROCEDURE — G0378 HOSPITAL OBSERVATION PER HR: HCPCS

## 2021-06-22 PROCEDURE — 83036 HEMOGLOBIN GLYCOSYLATED A1C: CPT

## 2021-06-22 PROCEDURE — 84484 ASSAY OF TROPONIN QUANT: CPT

## 2021-06-22 PROCEDURE — 6360000002 HC RX W HCPCS: Performed by: EMERGENCY MEDICINE

## 2021-06-22 PROCEDURE — 2580000003 HC RX 258: Performed by: EMERGENCY MEDICINE

## 2021-06-22 PROCEDURE — 81001 URINALYSIS AUTO W/SCOPE: CPT

## 2021-06-22 PROCEDURE — 84100 ASSAY OF PHOSPHORUS: CPT

## 2021-06-22 PROCEDURE — 84443 ASSAY THYROID STIM HORMONE: CPT

## 2021-06-22 PROCEDURE — 70450 CT HEAD/BRAIN W/O DYE: CPT

## 2021-06-22 PROCEDURE — 87635 SARS-COV-2 COVID-19 AMP PRB: CPT

## 2021-06-22 PROCEDURE — 83690 ASSAY OF LIPASE: CPT

## 2021-06-22 PROCEDURE — 99285 EMERGENCY DEPT VISIT HI MDM: CPT

## 2021-06-22 PROCEDURE — 83735 ASSAY OF MAGNESIUM: CPT

## 2021-06-22 PROCEDURE — 2580000003 HC RX 258: Performed by: HOSPITALIST

## 2021-06-22 PROCEDURE — 82947 ASSAY GLUCOSE BLOOD QUANT: CPT

## 2021-06-22 PROCEDURE — 93005 ELECTROCARDIOGRAM TRACING: CPT | Performed by: EMERGENCY MEDICINE

## 2021-06-22 PROCEDURE — 80061 LIPID PANEL: CPT

## 2021-06-22 RX ORDER — MAGNESIUM SULFATE 1 G/100ML
1000 INJECTION INTRAVENOUS ONCE
Status: COMPLETED | OUTPATIENT
Start: 2021-06-22 | End: 2021-06-22

## 2021-06-22 RX ORDER — ACETAMINOPHEN 650 MG/1
650 SUPPOSITORY RECTAL EVERY 6 HOURS PRN
Status: DISCONTINUED | OUTPATIENT
Start: 2021-06-22 | End: 2021-06-23 | Stop reason: HOSPADM

## 2021-06-22 RX ORDER — ONDANSETRON 2 MG/ML
4 INJECTION INTRAMUSCULAR; INTRAVENOUS EVERY 6 HOURS PRN
Status: DISCONTINUED | OUTPATIENT
Start: 2021-06-22 | End: 2021-06-23 | Stop reason: HOSPADM

## 2021-06-22 RX ORDER — DEXTROSE MONOHYDRATE 25 G/50ML
12.5 INJECTION, SOLUTION INTRAVENOUS PRN
Status: DISCONTINUED | OUTPATIENT
Start: 2021-06-22 | End: 2021-06-23 | Stop reason: HOSPADM

## 2021-06-22 RX ORDER — LACTULOSE 10 G/15ML
20 SOLUTION ORAL DAILY
Status: DISCONTINUED | OUTPATIENT
Start: 2021-06-23 | End: 2021-06-23 | Stop reason: HOSPADM

## 2021-06-22 RX ORDER — ATORVASTATIN CALCIUM 40 MG/1
80 TABLET, FILM COATED ORAL NIGHTLY
Status: DISCONTINUED | OUTPATIENT
Start: 2021-06-22 | End: 2021-06-23 | Stop reason: HOSPADM

## 2021-06-22 RX ORDER — DEXTROSE MONOHYDRATE 50 MG/ML
100 INJECTION, SOLUTION INTRAVENOUS PRN
Status: DISCONTINUED | OUTPATIENT
Start: 2021-06-22 | End: 2021-06-23 | Stop reason: HOSPADM

## 2021-06-22 RX ORDER — ASPIRIN 325 MG
325 TABLET ORAL ONCE
Status: DISCONTINUED | OUTPATIENT
Start: 2021-06-22 | End: 2021-06-23

## 2021-06-22 RX ORDER — ACETAMINOPHEN 325 MG/1
650 TABLET ORAL EVERY 6 HOURS PRN
Status: DISCONTINUED | OUTPATIENT
Start: 2021-06-22 | End: 2021-06-23 | Stop reason: HOSPADM

## 2021-06-22 RX ORDER — METOPROLOL SUCCINATE 50 MG/1
50 TABLET, EXTENDED RELEASE ORAL DAILY
Status: DISCONTINUED | OUTPATIENT
Start: 2021-06-23 | End: 2021-06-23 | Stop reason: HOSPADM

## 2021-06-22 RX ORDER — DOCUSATE SODIUM 100 MG/1
100 CAPSULE, LIQUID FILLED ORAL 2 TIMES DAILY
Status: DISCONTINUED | OUTPATIENT
Start: 2021-06-22 | End: 2021-06-23 | Stop reason: HOSPADM

## 2021-06-22 RX ORDER — MECOBALAMIN 5000 MCG
5 TABLET,DISINTEGRATING ORAL NIGHTLY PRN
Status: DISCONTINUED | OUTPATIENT
Start: 2021-06-22 | End: 2021-06-23 | Stop reason: HOSPADM

## 2021-06-22 RX ORDER — INSULIN GLARGINE 100 [IU]/ML
100 INJECTION, SOLUTION SUBCUTANEOUS NIGHTLY
Status: DISCONTINUED | OUTPATIENT
Start: 2021-06-22 | End: 2021-06-23

## 2021-06-22 RX ORDER — NICOTINE POLACRILEX 4 MG
15 LOZENGE BUCCAL PRN
Status: DISCONTINUED | OUTPATIENT
Start: 2021-06-22 | End: 2021-06-23 | Stop reason: HOSPADM

## 2021-06-22 RX ORDER — RISPERIDONE 0.25 MG/1
0.25 TABLET, FILM COATED ORAL DAILY
Status: DISCONTINUED | OUTPATIENT
Start: 2021-06-23 | End: 2021-06-23

## 2021-06-22 RX ORDER — NITROGLYCERIN 0.4 MG/1
0.4 TABLET SUBLINGUAL EVERY 5 MIN PRN
Status: DISCONTINUED | OUTPATIENT
Start: 2021-06-22 | End: 2021-06-23 | Stop reason: HOSPADM

## 2021-06-22 RX ORDER — ONDANSETRON 4 MG/1
4 TABLET, ORALLY DISINTEGRATING ORAL EVERY 8 HOURS PRN
Status: DISCONTINUED | OUTPATIENT
Start: 2021-06-22 | End: 2021-06-23 | Stop reason: HOSPADM

## 2021-06-22 RX ORDER — HYDROCHLOROTHIAZIDE 25 MG/1
25 TABLET ORAL DAILY
Status: DISCONTINUED | OUTPATIENT
Start: 2021-06-23 | End: 2021-06-23 | Stop reason: HOSPADM

## 2021-06-22 RX ORDER — SODIUM CHLORIDE 0.9 % (FLUSH) 0.9 %
5-40 SYRINGE (ML) INJECTION EVERY 12 HOURS SCHEDULED
Status: DISCONTINUED | OUTPATIENT
Start: 2021-06-22 | End: 2021-06-23 | Stop reason: HOSPADM

## 2021-06-22 RX ORDER — PANTOPRAZOLE SODIUM 40 MG/1
40 TABLET, DELAYED RELEASE ORAL
Status: DISCONTINUED | OUTPATIENT
Start: 2021-06-23 | End: 2021-06-23 | Stop reason: HOSPADM

## 2021-06-22 RX ORDER — SODIUM CHLORIDE 0.9 % (FLUSH) 0.9 %
5-40 SYRINGE (ML) INJECTION PRN
Status: DISCONTINUED | OUTPATIENT
Start: 2021-06-22 | End: 2021-06-23 | Stop reason: HOSPADM

## 2021-06-22 RX ORDER — SERTRALINE HYDROCHLORIDE 100 MG/1
100 TABLET, FILM COATED ORAL DAILY
Status: DISCONTINUED | OUTPATIENT
Start: 2021-06-22 | End: 2021-06-23 | Stop reason: HOSPADM

## 2021-06-22 RX ORDER — SODIUM CHLORIDE 9 MG/ML
25 INJECTION, SOLUTION INTRAVENOUS PRN
Status: DISCONTINUED | OUTPATIENT
Start: 2021-06-22 | End: 2021-06-23 | Stop reason: HOSPADM

## 2021-06-22 RX ORDER — SIMETHICONE 80 MG
80 TABLET,CHEWABLE ORAL EVERY 6 HOURS PRN
Status: DISCONTINUED | OUTPATIENT
Start: 2021-06-22 | End: 2021-06-23 | Stop reason: HOSPADM

## 2021-06-22 RX ORDER — POLYETHYLENE GLYCOL 3350 17 G/17G
17 POWDER, FOR SOLUTION ORAL DAILY PRN
Status: DISCONTINUED | OUTPATIENT
Start: 2021-06-22 | End: 2021-06-23 | Stop reason: HOSPADM

## 2021-06-22 RX ADMIN — SODIUM CHLORIDE, PRESERVATIVE FREE 10 ML: 5 INJECTION INTRAVENOUS at 21:30

## 2021-06-22 RX ADMIN — MAGNESIUM SULFATE HEPTAHYDRATE 1000 MG: 1 INJECTION, SOLUTION INTRAVENOUS at 20:46

## 2021-06-22 RX ADMIN — IOPAMIDOL 90 ML: 755 INJECTION, SOLUTION INTRAVENOUS at 19:36

## 2021-06-22 RX ADMIN — CEFTRIAXONE 1000 MG: 1 INJECTION, POWDER, FOR SOLUTION INTRAMUSCULAR; INTRAVENOUS at 20:46

## 2021-06-22 ASSESSMENT — PAIN SCALES - GENERAL: PAINLEVEL_OUTOF10: 6

## 2021-06-22 ASSESSMENT — PAIN DESCRIPTION - LOCATION: LOCATION: ABDOMEN

## 2021-06-22 NOTE — ED NOTES
Bed: 05  Expected date:   Expected time:   Means of arrival:   Comments:  [facility]     [place]  06/22/21 5920

## 2021-06-23 VITALS
DIASTOLIC BLOOD PRESSURE: 70 MMHG | SYSTOLIC BLOOD PRESSURE: 134 MMHG | HEART RATE: 86 BPM | HEIGHT: 60 IN | TEMPERATURE: 97.8 F | OXYGEN SATURATION: 93 % | BODY MASS INDEX: 24.23 KG/M2 | WEIGHT: 123.4 LBS | RESPIRATION RATE: 18 BRPM

## 2021-06-23 PROBLEM — G89.29 CHRONIC PAIN: Status: ACTIVE | Noted: 2021-06-23

## 2021-06-23 LAB
AMMONIA: 10 UMOL/L (ref 11–51)
ANION GAP SERPL CALCULATED.3IONS-SCNC: 15 MMOL/L (ref 7–19)
BUN BLDV-MCNC: 17 MG/DL (ref 8–23)
CALCIUM SERPL-MCNC: 9.8 MG/DL (ref 8.8–10.2)
CHLORIDE BLD-SCNC: 99 MMOL/L (ref 98–111)
CO2: 28 MMOL/L (ref 22–29)
CREAT SERPL-MCNC: 0.7 MG/DL (ref 0.5–0.9)
EKG P AXIS: 62 DEGREES
EKG P AXIS: 71 DEGREES
EKG P-R INTERVAL: 134 MS
EKG P-R INTERVAL: 152 MS
EKG Q-T INTERVAL: 380 MS
EKG Q-T INTERVAL: 406 MS
EKG QRS DURATION: 122 MS
EKG QRS DURATION: 126 MS
EKG QTC CALCULATION (BAZETT): 436 MS
EKG QTC CALCULATION (BAZETT): 451 MS
EKG T AXIS: 122 DEGREES
EKG T AXIS: 94 DEGREES
GFR AFRICAN AMERICAN: >59
GFR NON-AFRICAN AMERICAN: >60
GLUCOSE BLD-MCNC: 147 MG/DL (ref 74–109)
GLUCOSE BLD-MCNC: 181 MG/DL (ref 70–99)
GLUCOSE BLD-MCNC: 219 MG/DL (ref 70–99)
GLUCOSE BLD-MCNC: 241 MG/DL (ref 70–99)
HCT VFR BLD CALC: 34.1 % (ref 37–47)
HEMOGLOBIN: 10.6 G/DL (ref 12–16)
LV EF: 60 %
LVEF MODALITY: NORMAL
MAGNESIUM: 1.5 MG/DL (ref 1.6–2.4)
MCH RBC QN AUTO: 22.4 PG (ref 27–31)
MCHC RBC AUTO-ENTMCNC: 31.1 G/DL (ref 33–37)
MCV RBC AUTO: 72.1 FL (ref 81–99)
PDW BLD-RTO: 17.2 % (ref 11.5–14.5)
PERFORMED ON: ABNORMAL
PLATELET # BLD: 192 K/UL (ref 130–400)
PMV BLD AUTO: 9.5 FL (ref 9.4–12.3)
POTASSIUM REFLEX MAGNESIUM: 3.6 MMOL/L (ref 3.5–5)
RBC # BLD: 4.73 M/UL (ref 4.2–5.4)
SODIUM BLD-SCNC: 142 MMOL/L (ref 136–145)
TROPONIN: <0.01 NG/ML (ref 0–0.03)
TROPONIN: <0.01 NG/ML (ref 0–0.03)
WBC # BLD: 5.8 K/UL (ref 4.8–10.8)

## 2021-06-23 PROCEDURE — 82140 ASSAY OF AMMONIA: CPT

## 2021-06-23 PROCEDURE — 6370000000 HC RX 637 (ALT 250 FOR IP): Performed by: HOSPITALIST

## 2021-06-23 PROCEDURE — G0378 HOSPITAL OBSERVATION PER HR: HCPCS

## 2021-06-23 PROCEDURE — 92610 EVALUATE SWALLOWING FUNCTION: CPT

## 2021-06-23 PROCEDURE — 99214 OFFICE O/P EST MOD 30 MIN: CPT | Performed by: INTERNAL MEDICINE

## 2021-06-23 PROCEDURE — 2580000003 HC RX 258: Performed by: HOSPITALIST

## 2021-06-23 PROCEDURE — 93010 ELECTROCARDIOGRAM REPORT: CPT | Performed by: INTERNAL MEDICINE

## 2021-06-23 PROCEDURE — 85027 COMPLETE CBC AUTOMATED: CPT

## 2021-06-23 PROCEDURE — 36415 COLL VENOUS BLD VENIPUNCTURE: CPT

## 2021-06-23 PROCEDURE — 96366 THER/PROPH/DIAG IV INF ADDON: CPT

## 2021-06-23 PROCEDURE — 93306 TTE W/DOPPLER COMPLETE: CPT

## 2021-06-23 PROCEDURE — 83735 ASSAY OF MAGNESIUM: CPT

## 2021-06-23 PROCEDURE — 6370000000 HC RX 637 (ALT 250 FOR IP): Performed by: STUDENT IN AN ORGANIZED HEALTH CARE EDUCATION/TRAINING PROGRAM

## 2021-06-23 PROCEDURE — 84484 ASSAY OF TROPONIN QUANT: CPT

## 2021-06-23 PROCEDURE — 94640 AIRWAY INHALATION TREATMENT: CPT

## 2021-06-23 PROCEDURE — 80048 BASIC METABOLIC PNL TOTAL CA: CPT

## 2021-06-23 PROCEDURE — 82947 ASSAY GLUCOSE BLOOD QUANT: CPT

## 2021-06-23 PROCEDURE — 93005 ELECTROCARDIOGRAM TRACING: CPT | Performed by: HOSPITALIST

## 2021-06-23 PROCEDURE — 6360000002 HC RX W HCPCS: Performed by: STUDENT IN AN ORGANIZED HEALTH CARE EDUCATION/TRAINING PROGRAM

## 2021-06-23 RX ORDER — ASPIRIN 81 MG/1
81 TABLET, CHEWABLE ORAL DAILY
Status: DISCONTINUED | OUTPATIENT
Start: 2021-06-24 | End: 2021-06-23 | Stop reason: HOSPADM

## 2021-06-23 RX ORDER — MAGNESIUM SULFATE IN WATER 40 MG/ML
4000 INJECTION, SOLUTION INTRAVENOUS ONCE
Status: COMPLETED | OUTPATIENT
Start: 2021-06-23 | End: 2021-06-23

## 2021-06-23 RX ORDER — RISPERIDONE 0.25 MG/1
0.25 TABLET, FILM COATED ORAL DAILY
Status: DISCONTINUED | OUTPATIENT
Start: 2021-06-23 | End: 2021-06-23 | Stop reason: HOSPADM

## 2021-06-23 RX ORDER — RISPERIDONE 0.25 MG/1
0.25 TABLET, FILM COATED ORAL DAILY
Status: DISCONTINUED | OUTPATIENT
Start: 2021-06-24 | End: 2021-06-23

## 2021-06-23 RX ORDER — IPRATROPIUM BROMIDE AND ALBUTEROL SULFATE 2.5; .5 MG/3ML; MG/3ML
1 SOLUTION RESPIRATORY (INHALATION)
Status: DISCONTINUED | OUTPATIENT
Start: 2021-06-23 | End: 2021-06-23 | Stop reason: HOSPADM

## 2021-06-23 RX ORDER — HYDROCODONE BITARTRATE AND ACETAMINOPHEN 5; 325 MG/1; MG/1
1 TABLET ORAL NIGHTLY
Qty: 3 TABLET | Refills: 0 | Status: SHIPPED | OUTPATIENT
Start: 2021-06-23 | End: 2021-06-26

## 2021-06-23 RX ORDER — CLONAZEPAM 0.5 MG/1
0.5 TABLET ORAL 3 TIMES DAILY PRN
Qty: 9 TABLET | Refills: 0 | Status: SHIPPED | OUTPATIENT
Start: 2021-06-23 | End: 2021-06-26

## 2021-06-23 RX ORDER — INSULIN GLARGINE 100 [IU]/ML
50 INJECTION, SOLUTION SUBCUTANEOUS NIGHTLY
Status: DISCONTINUED | OUTPATIENT
Start: 2021-06-23 | End: 2021-06-23 | Stop reason: HOSPADM

## 2021-06-23 RX ORDER — MAGNESIUM SULFATE 1 G/100ML
1000 INJECTION INTRAVENOUS PRN
Status: DISCONTINUED | OUTPATIENT
Start: 2021-06-23 | End: 2021-06-23 | Stop reason: HOSPADM

## 2021-06-23 RX ADMIN — LACTULOSE 20 G: 20 SOLUTION ORAL at 10:05

## 2021-06-23 RX ADMIN — Medication 5 MG: at 00:17

## 2021-06-23 RX ADMIN — METOPROLOL SUCCINATE 50 MG: 50 TABLET, EXTENDED RELEASE ORAL at 09:57

## 2021-06-23 RX ADMIN — IPRATROPIUM BROMIDE AND ALBUTEROL SULFATE 1 AMPULE: .5; 3 SOLUTION RESPIRATORY (INHALATION) at 10:24

## 2021-06-23 RX ADMIN — IPRATROPIUM BROMIDE AND ALBUTEROL SULFATE 1 AMPULE: .5; 3 SOLUTION RESPIRATORY (INHALATION) at 14:30

## 2021-06-23 RX ADMIN — SODIUM CHLORIDE, PRESERVATIVE FREE 10 ML: 5 INJECTION INTRAVENOUS at 09:57

## 2021-06-23 RX ADMIN — SERTRALINE HYDROCHLORIDE 100 MG: 100 TABLET ORAL at 09:59

## 2021-06-23 RX ADMIN — RISPERIDONE 1.25 MG: 1 TABLET ORAL at 09:58

## 2021-06-23 RX ADMIN — HYDROCHLOROTHIAZIDE 25 MG: 25 TABLET ORAL at 09:59

## 2021-06-23 RX ADMIN — RISPERIDONE 0.25 MG: 0.25 TABLET ORAL at 00:17

## 2021-06-23 RX ADMIN — DOCUSATE SODIUM 100 MG: 100 CAPSULE, LIQUID FILLED ORAL at 09:59

## 2021-06-23 RX ADMIN — PANTOPRAZOLE SODIUM 40 MG: 40 TABLET, DELAYED RELEASE ORAL at 09:59

## 2021-06-23 RX ADMIN — ATORVASTATIN CALCIUM 80 MG: 40 TABLET, FILM COATED ORAL at 00:16

## 2021-06-23 RX ADMIN — MAGNESIUM SULFATE HEPTAHYDRATE 4000 MG: 40 INJECTION, SOLUTION INTRAVENOUS at 09:52

## 2021-06-23 ASSESSMENT — PAIN SCALES - WONG BAKER: WONGBAKER_NUMERICALRESPONSE: 0

## 2021-06-23 NOTE — PROGRESS NOTES
Patient is okay for discharge from cardiology standpoint per Dr. Lory Fernando.  Electronically signed by Joy Temple RN on 6/23/2021 at 4:16 PM

## 2021-06-23 NOTE — CONSULTS
Comprehensive Nutrition Assessment    Type and Reason for Visit:  Initial, Positive Nutrition Screen, Consult    Nutrition Recommendations/Plan: follow for po intake, labs, SLP recommendations    Nutrition Assessment:  Pt appears adequately nourished. SLP working with pt. Diet advanced to dysphagia soft and bite size with mildly thickened liquids. Modified current diet with addition of Carb Control. Aware pt is deaf and nonverbal    Malnutrition Assessment:  Malnutrition Status:  No malnutrition    Context:  Acute Illness     Findings of the 6 clinical characteristics of malnutrition:  Energy Intake:  Mild decrease in energy intake (Comment)  Weight Loss:  7 - Greater than 7.5% over 3 months     Body Fat Loss:  No significant body fat loss     Muscle Mass Loss:  No significant muscle mass loss    Fluid Accumulation:  No significant fluid accumulation     Strength:  Not Performed    Nutrition Related Findings:  unable to hear and speak      Wounds:  None       Current Nutrition Therapies:    ADULT DIET; Dysphagia - Soft and Bite Sized; 4 carb choices (60 gm/meal); Mildly Thick (Nectar)    Anthropometric Measures:  · Height: 5' (152.4 cm)  · Current Body Weight: 123 lb 6.4 oz (56 kg)   · Admission Body Weight: 140 lb (63.5 kg) (stated)    · Usual Body Weight: 135 lb (61.2 kg) (3/2021)     · Ideal Body Weight: 100 lbs; % Ideal Body Weight 123.4 %   · BMI: 24.1  · Adjusted Body Weight:  ; No Adjustment   · Adjusted BMI:      · BMI Categories: Normal Weight (BMI 18.5-24. 9)       Nutrition Diagnosis:   · Inadequate protein-energy intake, Altered nutrition-related lab values related to swallowing difficulty, endocrine dysfuntion as evidenced by swallow study results, lab values      Nutrition Interventions:   Food and/or Nutrient Delivery:  Modify Current Diet  Nutrition Education/Counseling:  No recommendation at this time   Coordination of Nutrition Care:  Continue to monitor while inpatient    Goals:  pointake 50% or greater. Accuchek's 150 or less       Nutrition Monitoring and Evaluation:   Behavioral-Environmental Outcomes:  None Identified   Food/Nutrient Intake Outcomes:  Diet Advancement/Tolerance, Food and Nutrient Intake, Supplement Intake  Physical Signs/Symptoms Outcomes:  Biochemical Data, Chewing or Swallowing, Weight, Skin, Nutrition Focused Physical Findings     Discharge Planning:     Too soon to determine     Electronically signed by Dayday Devi MS, RD, LD on 6/23/21 at 1:10 PM CDT    Contact: [phone]

## 2021-06-23 NOTE — DISCHARGE SUMMARY
Discharge Summary    NAME: Shad Singh  :  1945  MRN:  [id number]    Admit date:  2021  Discharge date:  2021    Admitting Physician:  Emma Lord MD    Advance Directive: Full Code    Consults: cardiology    Primary Care Physician:  No primary care provider on file. Discharge Diagnoses:    Atypical chest pain    Abdominal pain, unspecified    CAD    Chronic pain      Significant Diagnostic Studies:   Echo Complete    Result Date: 2021  Transthoracic Echocardiography Report (TTE)  Demographics   Patient Name  Roselia Booker Date of Study          2021   MRN           [id number]        Gender                 Female   Date of Birth 1945    Room Number            [facility]   Age           68 year(s)   Height:       60 inches     Referring Physician    Emma Lord MD   Weight:       123 pounds    Sonographer            [facility]varado [facility]   BSA:          1.52 m^2      Interpreting Physician Ashok Manuel DO   BMI:          24.02 kg/m^2  Procedure Type of Study   TTE procedure:ECHO NO CONTRAST WITH DOP/COLR. Study Location: Echo Lab Technical Quality: Adequate visualization Patient Status: Inpatient BP: 158/73 mmHg Indications:Chest pain. Conclusions   Summary  Normal left ventricular chamber size and systolic function. Mild concentric left ventricular hypertrophy. Left ventricular ejection fraction is visually estimated at 60%. Grade 2 LV diastolic dysfunction. The left atrium is mildly dilated. Signature   ----------------------------------------------------------------  Electronically signed by Ashok Manuel DO(Interpreting  physician) on 2021 09:21 AM  ----------------------------------------------------------------   Findings   Mitral Valve  Structurally normal mitral valve leaflets. There is trace mitral regurgitation. Mitral annular calcification is present. Aortic Valve  Aortic valve not well seen. Aortic valve sclerosis.   Possible mild aortic nondilated. Incidentally noted cavum septum lucidum. The basal cisterns are symmetric. Posterior fossa structures are unremarkable. The included orbits and their contents are unremarkable. The visualized paranasal sinuses, mastoid air cells and middle ear cavities are clear. The visualized osseous structures and overlying soft tissues of the skull and face are unremarkable. 1. No acute intracranial process. 2. Underlying chronic small vessel ischemic change. Signed by Dr Lady Alcaraz Additional Contrast? None    Result Date: 6/22/2021  CT ABDOMEN PELVIS W IV CONTRAST 6/22/2021 8:07 PM HISTORY: Abdominal pain COMPARISON: None. DLP: 924 mGy cm TECHNIQUE: Following the uneventful administration of intravenous iodinated contrast, helical CT tomographic images of the abdomen and pelvis were acquired. Coronal reformatted images were also provided for review. Automated exposure control was also utilized to decrease patient radiation dose. FINDINGS: The lung bases and base of the heart are unremarkable. LIVER: No focal liver lesion. The hepatic vasculature is patent. BILIARY SYSTEM: The gallbladder is unremarkable. No intrahepatic or extrahepatic ductal dilatation. PANCREAS: Mild fatty infiltration. SPLEEN: Unremarkable. KIDNEYS AND ADRENALS: Adrenal glands are unremarkable. Bilateral renal cortical thinning. No urolithiasis or hydronephrosis. The ureters are decompressed and normal in appearance. RETROPERITONEUM: No mass, lymphadenopathy or hemorrhage. GI TRACT: The stomach and small bowel are unremarkable. . Colon is unremarkable. OTHER: No mesenteric adenopathy or mass. No intraperitoneal free air or free fluid. Moderate atheromatous vascular calcification along the abdominal aorta. PELVIS: Incidentally noted calcified uterine fibroid. Urinary bladder is decompressed. Quite prominent mucosal hyperenhancement in the bladder.     1. Wall thickening with prominent mucosal enhancement along the urinary bladder, correlate for cystitis. 2. Advanced atheromatous vascular calcification with prominent calcified plaque at the renal artery origins. Signed by Dr Cristina Shrestha    Result Date: 6/22/2021  CTA PULMONARY W CONTRAST 6/22/2021 7:36 PM HISTORY: Chest pain, shortness of breath COMPARISON: CT scan dated 12/20/2020. DLP: 439 mGy cm TECHNIQUE: Helical tomographic images of the chest were obtained after the administration of intravenous contrast following angiogram protocol. Additionally, 3D MIP reconstructions in the coronal and sagittal planes were provided. Automated exposure control was also utilized to decrease patient radiation dose. FINDINGS:  Pulmonary arteries: There is adequate enhancement of the pulmonary arteries to evaluate for central and segmental pulmonary emboli. There are no filling defects within the main, lobar, segmental or visualized subsegmental pulmonary arteries. The pulmonary vessels are within normal limits for size. Aorta and great vessels: The aorta is well opacified and demonstrates no aneurysm, stenosis or dissection. No flow-limiting stenosis at the great vessel origins. Neck base: High-grade atheromatous plaque at the left carotid bifurcation. No cervical adenopathy identified. Diffuse thyroid goiter. . Lungs: 4 mm left lower lobe pulmonary nodule (series 9-image 115). Peribronchial thickening with endobronchial debris in the right lower lobe subsegmental airways. No consolidation. No pleural effusion. Heart: Four-chamber cardiomegaly. Aortic and mitral valvular calcification. Moderate coronary atheromatous calcification. No pericardial effusion. Lymph nodes: No pathologically enlarged mediastinal, hilar, or axillary lymph nodes are present. Bones and soft tissues: The osseous structures of the thorax and surrounding soft tissues demonstrate no acute process. Upper abdomen:  The imaged portion of the upper abdomen demonstrates no acute process. 1. No evidence of pulmonary embolus. 2. Peribronchial thickening with mild endobronchial debris in the right lower lobe subsegmental airways. This is very similar to the earlier comparison exam and likely a chronic airway inflammation, perhaps from silent aspiration. No consolidation. 3. Four-chamber cardiomegaly with advanced coronary atheromatous calcification. 4. High-grade atheromatous narrowing at the left carotid bifurcation. Signed by Dr Callum Waite      Pertinent Labs:   CBC:   Recent Labs     06/22/21 1840 06/23/21  0316   WBC 5.8 5.8   HGB 10.2* 10.6*    192     BMP:    Recent Labs     06/22/21 1840 06/23/21  0316    142   K 3.9 3.6   CL 96* 99   CO2 30* 28   BUN 25* 17   CREATININE 0.9 0.7   GLUCOSE 230* 147*     INR: No results for input(s): INR in the last 72 hours. Lipids:   Recent Labs     06/22/21 1840   CHOL 106*   HDL 39*     ABGs:No results for input(s): PHART, QKL8VGH, PO2ART, CVN2VNY, BEART, HGBAE, M8YKUYLA, CARBOXHGBART, 02THERAPY in the last 72 hours. HgBA1c:    Recent Labs     06/22/21 1840   LABA1C 8.0[facility] Course:   55-year-old female with dementia, nonverbal at baseline, CAD on aspirin and Plavix presented from her nursing facility with report of chest and abdominal pain, however no evidence of any typical cardiac like chest pain on evaluation. Symptoms resolved while being monitored. No events on telemetry. No ischemic changes on EKG, hold LBBB present. Serial troponins negative. Evaluated by cardiology, however patient's daughter notes that during a previous cardiology evaluation was told that they would not recommend further ischemic evaluation. Echocardiogram obtained. After further discussion with patient's family regarding risks/benefits/alternatives, they wish to be discharged from the hospital back to nursing facility and follow-up outpatient.           Physical Exam:  Vital Signs: /74   Pulse 88   Temp 98 °F (36.7 °C) (Temporal)   Resp 18   Ht 5' (1.524 m)   Wt 123 lb 6.4 oz (56 kg)   SpO2 92%   BMI 24.10 kg/m²   General appearance:. Alert, nonverbal  HEENT: Normocephalic. atraumatic  Chest: clear to auscultation bilaterally without wheezes or rhonchi. Cardiac: Normal heart tones with regular rate and rhythm, S1, S2 normal. No murmurs, gallops, or rubs auscultated. Abdomen:soft, non-tender; normal bowel sounds, no masses, no organomegaly. Extremities: No clubbing or cyanosis. No peripheral edema. Peripheral pulses palpable. Neurologic: nonverbal, follows some commands    Discharge Medications:       [facility] Medication Instructions XST:[id number]    Printed on:06/23/21 1705   Medication Information                      aspirin EC 81 MG EC tablet  Take 1 tablet by mouth daily             atorvastatin (LIPITOR) 40 MG tablet  Take 1 tablet by mouth nightly             clonazePAM (KLONOPIN) 0.5 MG tablet  Take 1 tablet by mouth 3 times daily as needed for Anxiety for up to 3 days. clopidogrel (PLAVIX) 75 MG tablet  Take 1 tablet by mouth daily             docusate sodium (COLACE) 100 MG capsule  Take 100 mg by mouth 2 times daily             glucagon 1 MG injection  Infuse intravenously once             Glucose 15 GM/32ML GEL  Take by mouth 2 times daily as needed             hydroCHLOROthiazide (HYDRODIURIL) 25 MG tablet  Take 25 mg by mouth daily             HYDROcodone-acetaminophen (NORCO) 5-325 MG per tablet  Take 1 tablet by mouth nightly for 3 days.              insulin glargine (BASAGLAR KWIKPEN) 100 UNIT/ML injection pen  Inject 100 Units into the skin nightly             insulin lispro (HUMALOG) 100 UNIT/ML injection vial  Inject 7 Units into the skin             lactulose (CHRONULAC) 10 GM/15ML solution  Take 20 g by mouth daily             metFORMIN (GLUCOPHAGE-XR) 500 MG extended release tablet  Take 1,000 mg by mouth 2 times daily              metoprolol succinate (TOPROL XL) 50 MG extended release tablet  Take 50 mg by mouth daily             miconazole (MICOTIN) 2 % powder  Apply topically 2 times daily. pantoprazole (PROTONIX) 40 MG tablet               risperiDONE (RISPERDAL) 0.25 MG tablet  Take 0.25 mg by mouth daily At 1400             risperiDONE (RISPERDAL) 1 MG tablet  Take 1.25 mg by mouth daily 0800             sertraline (ZOLOFT) 100 MG tablet  Take 100 mg by mouth daily             simethicone (MYLICON) 80 MG chewable tablet  Take 80 mg by mouth every 6 hours as needed for Flatulence                 Discharge Instructions: Follow up with Cardiologist.  Call for appointment. Follow up with physician at SNF. Take medications as directed. Resume activity as tolerated. Diet: ADULT DIET; Dysphagia - Soft and Bite Sized; 4 carb choices (60 gm/meal); Mildly Thick (Nectar)     Disposition: Patient is medically stable and will be discharged back to SNF. Time spent on discharge 35 minutes.       Signed:  Linda Lackey MD  6/23/2021 2:05 PM

## 2021-06-23 NOTE — H&P
Patient Information:  Patient: Leta Klinefelter  MRN: [id number]   Acct: [de-identified]  YOB: 1945  Admit Date: 6/22/2021      Primary Care Physician: No primary care provider on file. Advance Directive: Full Code  Health Care Proxy: her sister, Mrs. Caitie Romero, [phone]        SUBJECTIVE:    Chief Complaint   Patient presents with    Abdominal Pain    Chest Pain     EP Sign Out:  Chest Pain  Age 68 years (+2), story (+1), Risk factors of DM & HTN (+1), non specific T wave changes (+1) and a old LBBB (+0) -> needs inpatient work up    ABD Pain  CT Scan pending, so no ASA/Plavix etc as of yet    HPI:  Mrs. Leta Klinefelter is a pleasantly demented nonverbal 68year old lady from the nursing home. She had presented with apparent chest and abdominal pain. She has not had any fevers or chills or dyspnea as per family. They states she has had some diaphoresis. She has been not her usual self as per the sister, she has been going in other people's rooms at the [facility] [place] operated by [facility]. There is a bruise on her left upper posterior arm that they are concerned about. Review of Systems:   Review of Systems   Reason unable to perform ROS: as per general medical condition.      Past Medical History:   Diagnosis Date    Anxiety     Arthritis     CAD (coronary artery disease)     Deaf     Delusional disorder ([address]     Diabetes mellitus ([address]     Dizziness     Dysphagia     Gastro-esophageal reflux     Giddiness     Hypertension     Lack of coordination     Major depressive disorder     Nonspeaking deaf     Pyelonephritis     Schizoaffective disorder ([address]     Severe intellectual disabilities      Past Psychiatric History:  As per above    Past Surgical History:   Procedure Laterality Date    EYE SURGERY Bilateral      Social History     Tobacco History     Smoking Status  Never Smoker    Smokeless Tobacco Use  Never Used          Alcohol History     Alcohol Use Status  Never          Drug Use     Drug Use Status  Never          Sexual Activity     Sexually Active  Never           Family History   Problem Relation Age of Onset    Heart Disease Father     Diabetes type 2  Sister      Allergies:  No Known Allergies    Home Medications:  Prior to Admission medications    Medication Sig Start Date End Date Taking? Authorizing Provider   pantoprazole (PROTONIX) 40 MG tablet  1/2/21  Yes Historical Provider, MD   HYDROcodone-acetaminophen (NORCO) 5-325 MG per tablet Take 1 tablet by mouth nightly. Yes Historical Provider, MD   meloxicam (MOBIC) 7.5 MG tablet Take 7.5 mg by mouth daily   Yes Historical Provider, MD   hydroCHLOROthiazide (HYDRODIURIL) 25 MG tablet Take 25 mg by mouth daily   Yes Historical Provider, MD   aspirin EC 81 MG EC tablet Take 1 tablet by mouth daily 1/19/21  Yes MEKA Rodriguez   atorvastatin (LIPITOR) 40 MG tablet Take 1 tablet by mouth nightly 12/23/20  Yes Franchesca Hernandez MD   clopidogrel (PLAVIX) 75 MG tablet Take 1 tablet by mouth daily 12/24/20  Yes Franchesca Hernandez MD   clonazePAM (KLONOPIN) 0.5 MG tablet Take 1 tablet by mouth 3 times daily as needed for Anxiety for up to 3 days.  12/23/20 6/22/21 Yes Franchesca Hernandez MD   sertraline (ZOLOFT) 100 MG tablet Take 100 mg by mouth daily   Yes Historical Provider, MD   risperiDONE (RISPERDAL) 1 MG tablet Take 1.25 mg by mouth daily 0800   Yes Historical Provider, MD   risperiDONE (RISPERDAL) 0.25 MG tablet Take 0.25 mg by mouth daily At 1400   Yes Historical Provider, MD   docusate sodium (COLACE) 100 MG capsule Take 100 mg by mouth 2 times daily   Yes Historical Provider, MD   lactulose (CHRONULAC) 10 GM/15ML solution Take 20 g by mouth daily   Yes Historical Provider, MD   metoprolol succinate (TOPROL XL) 50 MG extended release tablet Take 50 mg by mouth daily   Yes Historical Provider, MD   metFORMIN (GLUCOPHAGE-XR) 500 MG extended release tablet Take 1,000 mg by mouth 2 times daily    Yes Historical Provider, MD   Glucose 15 GM/32ML GEL Take by mouth 2 times daily as needed    Historical Provider, MD   glucagon 1 MG injection Infuse intravenously once    Historical Provider, MD   insulin glargine (BASAGLAR KWIKPEN) 100 UNIT/ML injection pen Inject 100 Units into the skin nightly    Historical Provider, MD   miconazole (MICOTIN) 2 % powder Apply topically 2 times daily. 12/23/20   Maren Sanchez MD   insulin lispro (HUMALOG) 100 UNIT/ML injection vial Inject 7 Units into the skin    Historical Provider, MD   simethicone (MYLICON) 80 MG chewable tablet Take 80 mg by mouth every 6 hours as needed for Flatulence    Historical Provider, MD         OBJECTIVE:    Vitals:    06/23/21 0201   BP: (!) 158/73   Pulse: 80   Resp: 18   Temp: 97.5 °F (36.4 °C)   SpO2: 93%   breathing room air    BP (!) 158/73   Pulse 80   Temp 97.5 °F (36.4 °C) (Temporal)   Resp 18   Wt 140 lb (63.5 kg)   SpO2 93%   BMI 27.34 kg/m²     No intake or output data in the 24 hours ending 06/23/21 0259    Physical Exam  Vitals reviewed. Exam conducted with a chaperone present. Constitutional:       General: She is not in acute distress. Appearance: Normal appearance. She is not ill-appearing or toxic-appearing. HENT:      Head: Normocephalic and atraumatic. Nose: No congestion or rhinorrhea. Eyes:      General:         Right eye: No discharge. Left eye: No discharge. Neck:      Comments: Trachea appears midline  Cardiovascular:      Rate and Rhythm: Normal rate and regular rhythm. Heart sounds: No murmur heard. No friction rub. No gallop. Pulmonary:      Effort: Pulmonary effort is normal. No respiratory distress. Breath sounds: No stridor. No wheezing, rhonchi or rales. Abdominal:      General: Bowel sounds are normal.      Palpations: Abdomen is soft. Tenderness: There is no abdominal tenderness. There is no guarding or rebound. Musculoskeletal:      Cervical back: Neck supple.       Comments: Has mildiny increased fat to the earlier comparison exam and likely a chronic airway inflammation, perhaps from silent aspiration. No consolidation. 3. Four-chamber cardiomegaly with advanced coronary atheromatous calcification. 4. High-grade atheromatous narrowing at the left carotid bifurcation.  Signed by Dr Teresa Davis:    Chest Pain:  Tele  Admit to [facility] as OBSERVATION status patient  Cardio consult in AM  Trend TnI  Mag, Phos, TSH, Lipid Panel, HbA1c - will run as add ons to ED labs if able  CBC and BMP with Reflex for following AM  ASA as per protocol (324-325mg chewed STAT unless on 81ASA daily in which case 162mg chewed STAT if not yet given, THEN from following AM 81mg Daily.)  Statin as per protocol (High intensity Statin, if already on high intensity Stain of Simvasttain 80 continue it, if Lipitor 40+ then Lipitor 80 (if less than 40 just double so long as >=40), if Rosuvastatin 20mg+ then Rosuvastatin 40mg PO QHS (if less than 20 just double so long as >=20mg)  NG SL PRN CP  TTE in AM    UTI suspected by other providers: no significant UA nor leukocytosis nor fever with no PE finding of cystitis but imaging only combined with altered behavior  - continue rocephin for 2 more doses as already begun    Chronic Medical Problems:  Continue current Regimen as indicated  Is on a high dose of LA insulin, will follow as power home script issued just this January  replacing straight dosed SA with a High Dose ISS as per over 100 units per day usage, BMI nearly but not quite high enough to also be justification for same  Hypoglycemics orders set for pt safety  POCT scheduled and PRN  risperiDONE  0.25 mg Oral Daily   risperiDONE  1.25 mg Oral Daily   pantoprazole  40 mg Oral QAM AC   sertraline  100 mg Oral Daily   metoprolol succinate  50 mg Oral Daily   lactulose  20 g Oral Daily   hydroCHLOROthiazide  25 mg Oral Daily   &      docusate sodium  100 mg Oral BID   insulin glargine  100 Units Subcutaneous Nightly   insulin lispro  0-18 Units Subcutaneous TID WC   insulin lispro  0-9 Units Subcutaneous Nightly     Supportive and Prophylactic Txx:  DVT PPx: Heparin SQ  GI (PUD) PPx: not indicated as such but covered as per home  PT: CONTRAINDICATED as per CP complaint  Diet NPO Exceptions are: Sips of Water with Meds  simethicone, miconazole, glucose, dextrose, glucagon (rDNA), dextrose, sodium chloride flush, sodium chloride, ondansetron **OR** ondansetron, acetaminophen **OR** acetaminophen, perflutren lipid microspheres, polyethylene glycol, melatonin, nitroGLYCERIN      Care time of >40 minutes  Pt seen/examined and admitted to OBServation status. Inpatient status is used for patients with an expected LOS extending past two midnights due to medical therapy and or critical care needs, otherwise patients are placed to OBServation status. Signed:  Electronically signed by Beverley Rodríguez MD on 6/23/21 at 03:20 AM CDT.

## 2021-06-23 NOTE — PROGRESS NOTES
4 Eyes Skin Assessment    Tony Moise is being assessed upon: Admission    I agree that Maria Elena Wisdom RN, along with Rinku Dumont RN (either 2 RN's or 1 LPN and 1 RN) have performed a thorough Head to Toe Skin Assessment on the patient. ALL assessment sites listed below have been assessed. Areas assessed by both nurses:     [x]   Head, Face, and Ears   [x]   Shoulders, Back, and Chest  [x]   Arms, Elbows, and Hands   [x]   Coccyx, Sacrum, and Ischium  [x]   Legs, Feet, and Heels    Does the Patient have Skin Breakdown?  No    [place] Prevention initiated: Yes  Wound Care Orders initiated: No    WOC nurse consulted for Pressure Injury (Stage 3,4, Unstageable, DTI, NWPT, and Complex wounds) and New or Established Ostomies: NA        Primary Nurse eSignature: Elda Perez RN on 6/22/2021 at 10:27 PM      Co-Signer eSignature: Electronically signed by Rinku Dumont RN on 6/22/21 at 11:49 PM CDT

## 2021-06-23 NOTE — CARE COORDINATION
Confirmed with Nidia at [facility] the pt is a resident and has a bed hold to return.      [facility] Ph: [phone] F: [phone]

## 2021-06-23 NOTE — PROGRESS NOTES
Speech Language Pathology  Facility/Department: [facility] PROGRESSIVE CARE   CLINICAL BEDSIDE SWALLOW EVALUATION    NAME: Devante Huggins  : 1945  MRN: [id number]    ADMISSION DATE: 2021  ADMITTING DIAGNOSIS: has LBBB (left bundle branch block); Essential hypertension; DMII (diabetes mellitus, type 2) ([facility] [address] Chronic combined systolic and diastolic congestive heart failure ([facility] [address] Hypotension; Insomnia due to anxiety and fear; Coronary artery disease involving native coronary artery of native heart without angina pectoris; and Chest pain due to myocardial ischemia on their problem list.    Date of Eval: 2021  Evaluating Therapist: MERT Calvo    Current Diet level:  NPO    Reason for Referral  Devante Huggins was referred for a bedside swallow evaluation to assess the efficiency of her swallow function, identify signs and symptoms of aspiration and make recommendations regarding safe dietary consistencies, effective compensatory strategies, and safe eating environment. Impression  Assessed patient's swallowing function. Patient exhibited decreased oral prep and decreased oral transit of more solid consistencies (regular solid consistency residue cleared from the mouth with additional dry swallows). Patient also exhibited fast oral transit and suspected swallow delay with thin liquids and sluggish, mild-moderately decreased laryngeal elevation for swallow airway protection. No outward S/S penetration/aspiration was noted with any ice chip trial, puree consistency trial, regular solid consistency trial, or mildly thick/nectar thick liquid trial presented during evaluation this date. Patient did exhibit S/S penetration/aspiration with thin H2O trials with immediate coughs observed post swallows. At this time, would trial soft and bite sized consistency with mildly thick/nectar thick liquids. Recommend meds crushed in pudding/applesauce.  If patient receives mouth care prior to intake, okay for ice chips IN BETWEEN MEALS for comfort. Will continue to follow. Thank you for this consult. Treatment Plan  Requires SLP Intervention: Yes     Recommended Diet and Intervention  Diet Solids Recommendation: Soft and bite sized  Liquid Consistency Recommendation: Mildly thick/nectar thick liquids  Recommended Form of Meds: Meds crushed in puree as able  Therapeutic Interventions: Patient/Family education;Diet tolerance monitoring     Compensatory Swallowing Strategies  Compensatory Swallowing Strategies: Upright as possible for all oral intake;Small bites/sips;Eat/Feed slowly; Alternate solids and liquids; Remain upright for 30-45 minutes after meals     Treatment/Goals  Timeframe for Short-term Goals: 1x/day for 3 days   Goal 1: Patient will tolerate soft and bite sized consistency and mildly thick/nectar thick liquids with min S/S penetration/aspiration during PO intake. Goal 2: Patient staff will follow swallow safety recommendations to decrease risk of penetration/aspiration during PO intake. Goal 3: Re-assessment of swallow function for potential diet upgrade. General  Chart Reviewed: Yes  Behavior/Cognition: Alert  O2 Device: None (Room air)  Follows Directions: Simple, with encouragement from present family member  Dentition: Edentulous   Patient Positioning: Upright in bed  Consistencies Administered: Ice chips;Dysphagia Pureed (Dysphagia I); Regular solid; Nectar - cup; Thin - cup      Assessed patient's swallowing function with the following observations noted:     Oral Phase  Mastication: Ice chips;Regular solid (Patient exhibited adequate vertical jaw movement at the front of the mouth during oral prep of ice chip trials and regular solid consistency trials presented by SLP.)  Suspected Premature Bolus Loss: Regular solid; Thin - cup (Oral transit of regular solid consistency varied from 1-3 seconds in length.  Patient exhibited fast oral transit of thin H2O trials presented via cup by SLP.)  Decreased Oral Transit: Regular solid (Min-moderate oral cavity residue was noted post swallows; residue cleared from the mouth with additional dry swallows.)  Oral Phase - Comment: Oral transit of ice chip trials primarily measured 1-2 seconds in length. Oral transit of puree consistency trials, presented by SLP, primarily measured 1-2 seconds in length and no oral cavity residue was noted post swallows. Oral transit of nectar thick liquid trials, presented via cup by SLP, primarily measured 1-2 seconds in length. Pharyngeal Phase  Suspected Swallow Delay: Regular solid; Thin - cup (Suspect secondary to oral transit times.)  Laryngeal Elevation: (Patient exhibited sluggish, mild-moderately decreased laryngeal elevation for swallow airway protection.)  Immediate Cough: Thin - cup   Pharyngeal Phase - Comment: No outward S/S penetration/aspiration was noted with any ice chip trial, puree consistency trial, regular solid consistency trial, or mildly thick/nectar thick liquid trial presented during assessment this date. Patient did exhibit S/S penetration/aspiration with thin H2O trials with immediate coughs observed post swallows. At this time, would trial soft and bite sized consistency with mildly thick/nectar thick liquids. Recommend meds crushed in pudding/applesauce. If patient receives mouth care prior to intake, okay for ice chips IN BETWEEN MEALS for comfort. Will continue to follow.     Electronically signed by MERT Mendoza on 6/23/2021 at 12:52 PM

## 2021-06-23 NOTE — ED PROVIDER NOTES
[facility]  eMERGENCY dEPARTMENT eNCOUnter      Pt Name: Gustavo Adames  MRN: [id number]  [place] 1945  Date of evaluation: 6/22/2021  Provider: Tonya Avila MD    CHIEF COMPLAINT       Chief Complaint   Patient presents with    Abdominal Pain    Chest Pain         HISTORY OF PRESENT ILLNESS   (Location/Symptom, Timing/Onset,Context/Setting, Quality, Duration, Modifying Factors, Severity)  Note limiting factors. Gustavo Adames is a 68 y.o. female who presents to the emergency department due to chest and abdominal pain. Patient is nonverbal.  Her sister is here and helps with the history. 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 She lives in nursing home. Feels like she has had a slight mental decline recently and today acted like she was having some discomfort in her abdomen chest.  With her sister assisting does seem like the patient indicates she is having discomfort in the abdomen going up into the chest and all the way into the neck. No fevers vomiting or diarrhea. Has strabismus of her eyes which is baseline. Sister describes a possible syncopal episode at the nursing home today. No injuries. Clearly, very limited history. HPI    NursingNotes were reviewed. REVIEW OF SYSTEMS    (2-9 systems for level 4, 10 or more for level 5)     Review of Systems   Unable to perform ROS: Patient nonverbal       A complete review of systems was performed and is negative except as noted above in the HPI.        PAST MEDICAL HISTORY     Past Medical History:   Diagnosis Date    Anxiety     Arthritis     CAD (coronary artery disease)     Deaf     Delusional disorder ([address]     Diabetes mellitus ([address]     Dizziness     Dysphagia     Gastro-esophageal reflux     Giddiness     Hypertension     Lack of coordination     Major depressive disorder     Nonspeaking deaf     Pyelonephritis     Schizoaffective disorder ([address]     Severe intellectual disabilities          SURGICAL HISTORY       Past Surgical History:   Procedure Laterality Date    EYE SURGERY Bilateral          CURRENT MEDICATIONS       Current Discharge Medication List      CONTINUE these medications which have NOT CHANGED    Details   pantoprazole (PROTONIX) 40 MG tablet       HYDROcodone-acetaminophen (NORCO) 5-325 MG per tablet Take 1 tablet by mouth nightly. meloxicam (MOBIC) 7.5 MG tablet Take 7.5 mg by mouth daily      hydroCHLOROthiazide (HYDRODIURIL) 25 MG tablet Take 25 mg by mouth daily      aspirin EC 81 MG EC tablet Take 1 tablet by mouth daily  Qty: 90 tablet, Refills: 3      atorvastatin (LIPITOR) 40 MG tablet Take 1 tablet by mouth nightly  Qty: 30 tablet, Refills: 3      clopidogrel (PLAVIX) 75 MG tablet Take 1 tablet by mouth daily  Qty: 30 tablet, Refills: 3      clonazePAM (KLONOPIN) 0.5 MG tablet Take 1 tablet by mouth 3 times daily as needed for Anxiety for up to 3 days. Qty: 9 tablet, Refills: 0    Associated Diagnoses: Insomnia due to anxiety and fear      sertraline (ZOLOFT) 100 MG tablet Take 100 mg by mouth daily      !! risperiDONE (RISPERDAL) 1 MG tablet Take 1.25 mg by mouth daily 0800      !! risperiDONE (RISPERDAL) 0.25 MG tablet Take 0.25 mg by mouth daily At 1400      docusate sodium (COLACE) 100 MG capsule Take 100 mg by mouth 2 times daily      lactulose (CHRONULAC) 10 GM/15ML solution Take 20 g by mouth daily      metoprolol succinate (TOPROL XL) 50 MG extended release tablet Take 50 mg by mouth daily      metFORMIN (GLUCOPHAGE-XR) 500 MG extended release tablet Take 1,000 mg by mouth 2 times daily       Glucose 15 GM/32ML GEL Take by mouth 2 times daily as needed      glucagon 1 MG injection Infuse intravenously once      insulin glargine (BASAGLAR KWIKPEN) 100 UNIT/ML injection pen Inject 100 Units into the skin nightly      miconazole (MICOTIN) 2 % powder Apply topically 2 times daily.   Qty: 45 g, Refills: 1      insulin lispro (HUMALOG) 100 UNIT/ML injection vial Inject 7 Units into the skin      simethicone (MYLICON) 80 MG chewable tablet Take 80 mg by mouth every 6 hours as needed for Flatulence       !! - Potential duplicate medications found. Please discuss with provider. ALLERGIES     Patient has no known allergies. FAMILY HISTORY       Family History   Problem Relation Age of Onset    Heart Disease Father     Diabetes type 2  Sister           SOCIAL HISTORY       Social History     Socioeconomic History    Marital status: Single     Spouse name: None    Number of children: None    Years of education: None    Highest education level: None   Occupational History    None   Tobacco Use    Smoking status: Never Smoker    Smokeless tobacco: Never Used   Vaping Use    Vaping Use: Never used   Substance and Sexual Activity    Alcohol use: Never    Drug use: Never    Sexual activity: Never   Other Topics Concern    None   Social History Narrative    Advance Directive: Full Code    Health Care Proxy: her sister, Mrs. Miryam Young, [phone]     Social Determinants of Health     Financial Resource Strain:     Difficulty of Paying Living Expenses:    Food Insecurity:     Worried About Running Out of Food in the Last Year:     [address] in the Last Year:    Transportation Needs:     Lack of Transportation (Medical):      Lack of Transportation (Non-Medical):    Physical Activity:     Days of Exercise per Week:     Minutes of Exercise per Session:    Stress:     Feeling of Stress :    Social Connections:     Frequency of Communication with Friends and Family:     Frequency of Social Gatherings with Friends and Family:     Attends Sabianist Services:     Active Member of Clubs or Organizations:     Attends Club or Organization Meetings:     Marital Status:    Intimate Partner Violence:     Fear of Current or Ex-Partner:     Emotionally Abused:     Physically Abused:     Sexually Abused:        SCREENINGS T wave changes laterally that looks slightly changed compared to prior EKG. Left bundle branch appear similar to prior EKG. RADIOLOGY:   Non-plain film images such as CT, Ultrasound and MRI are read by the radiologist. Plainradiographic images are visualized and preliminarily interpreted by the emergency physician with the below findings:      Interpretation per the Radiologist below, if available at the time of this note:    CTA PULMONARY W CONTRAST   Final Result   1. No evidence of pulmonary embolus. 2. Peribronchial thickening with mild endobronchial debris in the   right lower lobe subsegmental airways. This is very similar to the   earlier comparison exam and likely a chronic airway inflammation,   perhaps from silent aspiration. No consolidation. 3. Four-chamber cardiomegaly with advanced coronary atheromatous   calcification. 4. High-grade atheromatous narrowing at the left carotid bifurcation. Signed by Dr Ying Pulling Additional Contrast? None   Final Result   1. Wall thickening with prominent mucosal enhancement along the   urinary bladder, correlate for cystitis. 2. Advanced atheromatous vascular calcification with prominent   calcified plaque at the renal artery origins. Signed by Dr Libra Spence Contrast   Final Result   1. No acute intracranial process. 2. Underlying chronic small vessel ischemic change.    Signed by Dr Santa Redding:  Teddy Bishop - Abnormal; Notable for the following components:       Result Value    Ketones, Urine TRACE (*)     Leukocyte Esterase, Urine SMALL (*)     All other components within normal limits   CBC WITH AUTO DIFFERENTIAL - Abnormal; Notable for the following components:    Hemoglobin 10.2 (*)     Hematocrit 33.1 (*)     MCV 72.7 (*)     MCH 22.4 (*)     MCHC 30.8 (*)     RDW 17.2 (*)     MPV 9.3 (*)     Neutrophils % 72.0 (*)     Lymphocytes % 18.9 (*)     All other components within normal limits   COMPREHENSIVE METABOLIC PANEL - Abnormal; Notable for the following components:    Chloride 96 (*)     CO2 30 (*)     Glucose 230 (*)     BUN 25 (*)     Alkaline Phosphatase 111 (*)     All other components within normal limits   LIPASE - Abnormal; Notable for the following components:    Lipase 78 (*)     All other components within normal limits   MICROSCOPIC URINALYSIS - Abnormal; Notable for the following components:    Bacteria, UA NEGATIVE (*)     Crystals, UA NEG (*)     WBC, UA 15 (*)     All other components within normal limits   HEMOGLOBIN A1C - Abnormal; Notable for the following components:    Hemoglobin A1C 8.0 (*)     All other components within normal limits   LIPID PANEL - Abnormal; Notable for the following components:    Cholesterol, Total 106 (*)     Triglycerides 151 (*)     HDL 39 (*)     All other components within normal limits   MAGNESIUM - Abnormal; Notable for the following components:    Magnesium 1.2 (*)     All other components within normal limits   CBC - Abnormal; Notable for the following components:    Hemoglobin 10.6 (*)     Hematocrit 34.1 (*)     MCV 72.1 (*)     MCH 22.4 (*)     MCHC 31.1 (*)     RDW 17.2 (*)     All other components within normal limits   BASIC METABOLIC PANEL W/ REFLEX TO MG FOR LOW K - Abnormal; Notable for the following components:    Glucose 147 (*)     All other components within normal limits   MAGNESIUM - Abnormal; Notable for the following components:    Magnesium 1.5 (*)     All other components within normal limits   AMMONIA - Abnormal; Notable for the following components:    Ammonia 10 (*)     All other components within normal limits    Narrative:     Collection has been rescheduled by NAY at 06/23/2021 08:10 Reason:   Gone for a echo  Collection has been rescheduled by Cristian Pearson at 06/23/2021 08:32 Reason: Pt   gone for test    POCT GLUCOSE - Abnormal; Notable for the following components:    POC Glucose 179 (*) All other components within normal limits   POCT GLUCOSE - Abnormal; Notable for the following components:    POC Glucose 181 (*)     All other components within normal limits   COVID-19, RAPID   TROPONIN   TSH WITH REFLEX TO FT4   PHOSPHORUS   TROPONIN   TROPONIN   TROPONIN   POCT GLUCOSE   POCT GLUCOSE   POCT GLUCOSE   POCT GLUCOSE       All other labs were within normal range or not returned as of this dictation. EMERGENCY DEPARTMENT COURSE and DIFFERENTIALDIAGNOSIS/MDM:   Vitals:    Vitals:    06/22/21 2133 06/23/21 0201 06/23/21 0455 06/23/21 0640   BP: (!) 168/77 (!) 158/73  138/68   Pulse: 88 80  83   Resp: 16 18  16   Temp: 99 °F (37.2 °C) 97.5 °F (36.4 °C)  97.6 °F (36.4 °C)   TempSrc:  Temporal  Temporal   SpO2: 94% 93%  92%   Weight:   123 lb 6.4 oz (56 kg)        MDM  Patient resting comfortably no distress at this time. Patient's case discussed with Dr. Duane Boggs who is agreeable with plan of care and admission. Family updated about plan. CONSULTS:  IP CONSULT TO CARDIOLOGY  IP CONSULT TO DIETITIAN  IP CONSULT TO SOCIAL WORK    PROCEDURES:  Unless otherwise notedbelow, none     Procedures    FINAL IMPRESSION     1. Abdominal pain, unspecified abdominal location    2. Possible Urinary tract infection without hematuria, site unspecified    3. Hypomagnesemia    4.  Syncope and collapse          DISPOSITION/PLAN   DISPOSITION Admitted 06/22/2021 07:47:57 PM      PATIENT REFERRED TO:  @FUP@    DISCHARGE MEDICATIONS:  Current Discharge Medication List             (Please note that portions of this note were completed with a voice recognition program.  Efforts were made to edit the dictations butoccasionally words are mis-transcribed.)    Christina Smiley MD (electronically signed)  AttendingEmergency Physician         Christina Smiley MD  06/23/21 7315

## 2021-06-23 NOTE — CONSULTS
[facility]  Cardiology Consult      Requesting MD:  Endy Dela Cruz MD   Admit Status:  Observation [104]       History obtained from:   [x] Patient  [] Other (specify):     Patient:  Khurram Tran  [id number]      Chief Complaint:   Chief Complaint   Patient presents with    Abdominal Pain    Chest Pain       HPI:  Mrs. Khurram Tran is a pleasantly-demented quietly-non-verbal  [place] lady of 68 years. She is known to have history of coronary artery disease status post PCI to right coronary artery with 3 drug-eluting stents in December 2020, she had remaining disease in the LAD and diagonal which we decided to manage medically    Patient is a nursing home facility resident, she was brought to our hospital on account of chest pain abdominal pain, underlying under work-up as the patient could not give a good history because she is nonverbal, her EKG did not show any acute ischemic changes, serial troponins were negative echo was obtained did not show any changes from prior studies    Patient was ruled out for acute coronary syndrome and the patient's family want to take her back to nursing home with outpatient follow-up as they are not willing for any invasive evaluation      Review of Systems:  Review of Systems   All other systems reviewed and are negative.       Cardiac Specific Data:  Specialty Problems        Cardiology Problems    Essential hypertension        Chronic combined systolic and diastolic congestive heart failure (HCC)        * (Principal) LBBB (left bundle branch block)              Past Medical History:  Past Medical History:   Diagnosis Date    Anxiety     Arthritis     CAD (coronary artery disease)     Deaf     [facility]ional disorder ([facility]     Diabetes mellitus ([facility]     Dizziness     Dysphagia     Gastro-esophageal reflux     Giddiness     Hypertension     Lack of coordination     Major depressive disorder     Nonspeaking deaf     Pyelonephritis  Schizoaffective disorder ([facility]     Severe intellectual disabilities         Past Surgical History:  Past Surgical History:   Procedure Laterality Date    EYE SURGERY Bilateral        Past Family History:  Family History   Problem Relation Age of Onset    Heart Disease Father     Diabetes type 2  Sister        Past Social History:  Social History     Socioeconomic History    Marital status: Single     Spouse name: Not on file    Number of children: Not on file    Years of education: Not on file    Highest education level: Not on file   Occupational History    Not on file   Tobacco Use    Smoking status: Never Smoker    Smokeless tobacco: Never Used   Vaping Use    Vaping Use: Never used   Substance and Sexual Activity    Alcohol use: Never    Drug use: Never    Sexual activity: Never   Other Topics Concern    Not on file   Social History Narrative    Advance Directive: Full Code    Health Care Proxy: her sister, Mrs. Erin Muro, [phone]     Social Determinants of Health     Financial Resource Strain:     Difficulty of Paying Living Expenses:    Food Insecurity:     Worried About Running Out of Food in the Last Year:     [address] in the Last Year:    Transportation Needs:     Lack of Transportation (Medical):  Lack of Transportation (Non-Medical):    Physical Activity:     Days of Exercise per Week:     Minutes of Exercise per Session:    Stress:     Feeling of Stress :    Social Connections:     Frequency of Communication with Friends and Family:     Frequency of Social Gatherings with Friends and Family:     Attends Sikh Services:     Active Member of Clubs or Organizations:     Attends Club or Organization Meetings:     Marital Status:    Intimate Partner Violence:     Fear of Current or Ex-Partner:     Emotionally Abused:     Physically Abused:     Sexually Abused:         Allergies:  No Known Allergies    Home Meds:  Prior to Admission medications Medication Sig Start Date End Date Taking? Authorizing Provider   HYDROcodone-acetaminophen (NORCO) 5-325 MG per tablet Take 1 tablet by mouth nightly for 3 days. 6/23/21 6/26/21 Yes Jasbir Bender MD   clonazePAM (KLONOPIN) 0.5 MG tablet Take 1 tablet by mouth 3 times daily as needed for Anxiety for up to 3 days.  6/23/21 6/26/21 Yes Jasbir Bender MD   pantoprazole ([address]) 40 MG tablet  1/2/21  Yes Historical Provider, MD   hydroCHLOROthiazide (HYDRODIURIL) 25 MG tablet Take 25 mg by mouth daily   Yes Historical Provider, MD   aspirin EC 81 MG EC tablet Take 1 tablet by mouth daily 1/19/21  Yes MEKA Schmidt   atorvastatin (LIPITOR) 40 MG tablet Take 1 tablet by mouth nightly 12/23/20  Yes April Gallardo MD   clopidogrel (PLAVIX) 75 MG tablet Take 1 tablet by mouth daily 12/24/20  Yes April Gallardo MD   sertraline (ZOLOFT) 100 MG tablet Take 100 mg by mouth daily   Yes Historical Provider, MD   risperiDONE (RISPERDAL) 1 MG tablet Take 1.25 mg by mouth daily 0800   Yes Historical Provider, MD   risperiDONE (RISPERDAL) 0.25 MG tablet Take 0.25 mg by mouth daily At 1400   Yes Historical Provider, MD   docusate sodium (COLACE) 100 MG capsule Take 100 mg by mouth 2 times daily   Yes Historical Provider, MD   lactulose (CHRONULAC) 10 GM/15ML solution Take 20 g by mouth daily   Yes Historical Provider, MD   metoprolol succinate (TOPROL XL) 50 MG extended release tablet Take 50 mg by mouth daily   Yes Historical Provider, MD   metFORMIN (GLUCOPHAGE-XR) 500 MG extended release tablet Take 1,000 mg by mouth 2 times daily    Yes Historical Provider, MD   Glucose 15 GM/32ML GEL Take by mouth 2 times daily as needed    Historical Provider, MD   glucagon 1 MG injection Infuse intravenously once    Historical Provider, MD   insulin glargine (BASAGLAR KWIKPEN) 100 UNIT/ML injection pen Inject 100 Units into the skin nightly    Historical Provider, MD   miconazole (MICOTIN) 2 % powder Apply topically 2 times daily. 12/23/20   Aliza Garcia MD   insulin lispro (HUMALOG) 100 UNIT/ML injection vial Inject 7 Units into the skin    Historical Provider, MD   simethicone (MYLICON) 80 MG chewable tablet Take 80 mg by mouth every 6 hours as needed for Flatulence    Historical Provider, MD       Current Meds:   risperiDONE  0.25 mg Oral Daily    risperiDONE  1.25 mg Oral Daily    [START ON 6/24/2021] aspirin  81 mg Oral Daily    insulin glargine  50 Units Subcutaneous Nightly    ipratropium-albuterol  1 ampule Inhalation Q4H WA    pantoprazole  40 mg Oral QAM AC    sertraline  100 mg Oral Daily    metoprolol succinate  50 mg Oral Daily    lactulose  20 g Oral Daily    hydroCHLOROthiazide  25 mg Oral Daily    atorvastatin  80 mg Oral Nightly    docusate sodium  100 mg Oral BID    insulin lispro  0-18 Units Subcutaneous TID WC    insulin lispro  0-9 Units Subcutaneous Nightly    sodium chloride flush  5-40 mL Intravenous 2 times per day       Current Infused Meds:   dextrose      sodium chloride         Physical Exam:  Vitals:    06/23/21 1417   BP: 134/70   Pulse: 86   Resp: 18   Temp: 97.8 °F (36.6 °C)   SpO2: 93%       Intake/Output Summary (Last 24 hours) at 6/23/2021 1608  Last data filed at 6/23/2021 1200  Gross per 24 hour   Intake 120 ml   Output 600 ml   Net -480 ml     Estimated body mass index is 24.1 kg/m² as calculated from the following:    Height as of this encounter: 5' (1.524 m). Weight as of this encounter: 123 lb 6.4 oz (56 kg). Physical Exam  Vitals reviewed. HENT:      Head: Normocephalic. Right Ear: Tympanic membrane normal.      Nose: Nose normal.   Eyes:      Pupils: Pupils are equal, round, and reactive to light. Cardiovascular:      Rate and Rhythm: Normal rate and regular rhythm. Pulses: Normal pulses. Heart sounds: Normal heart sounds. Pulmonary:      Effort: Pulmonary effort is normal.      Breath sounds: Normal breath sounds.    Abdominal:      General: Abdomen is flat. Musculoskeletal:      Cervical back: Normal range of motion and neck supple. Skin:     General: Skin is warm. Neurological:      General: No focal deficit present. Mental Status: She is alert. Labs:  Recent Labs     06/22/21 1840 06/23/21 0316   WBC 5.8 5.8   HGB 10.2* 10.6*    192       Recent Labs     06/22/21  1840 06/23/21  0316 06/23/21  0628    142  --    K 3.9 3.6  --    CL 96* 99  --    CO2 30* 28  --    BUN 25* 17  --    CREATININE 0.9 0.7  --    LABGLOM >60 >60  --    MG 1.2*  --  1.5*   CALCIUM 9.7 9.8  --    PHOS 3.2  --   --        CK, CKMB, Troponin: @LABRCNT (CKTOTAL:3, CKMB:3, TROPONINI:3)@    Last 3 BNP:  No results for input(s): BNP in the last 72 hours. IMAGING:  Xr Chest Portable    Result Date: 12/20/2020  Exam: XR CHEST PORTABLE - 12/19/2020 11:50 PM Indication: Fever, hypotension Comparison: None available. Findings: Cardiac silhouette is mildly enlarged. No pleural effusion, pneumothorax, or focal consolidative airspace process. Mild interstitial opacity throughout both lungs. Suspected mild atelectasis at both lung bases. No acute osseous finding. Impression: 1. No consolidation to suggest pneumonia. 2.  Mild interstitial opacity, correlate for mild edema. 3.  Mild cardiomegaly. Signed by Dr Yo Plummer on 12/20/2020 6:29 AM    Cta Pulmonary W Contrast    Result Date: 12/20/2020  Exam: CT angiography with 3D MIP images chest with IV contrast - 12/20/2020 3:55 AM Indication: Hypoxia, elevated troponin Comparison: None available. DLP: 628 mGy cm. In order to have a CT radiation dose as low as reasonably achievable, Automated Exposure Control was utilized for adjustment of the mA and/or KV according to patient size. Findings: No pulmonary embolus. Main pulmonary trunk is nondilated. Thoracic aorta is normal caliber. Atherosclerotic calcification plaque throughout the thoracic aorta and coronary arteries. Heart is enlarged.  No factor management. Follow-up with me in the office to discuss further steps      Signatures      ----------------------------------------------------------------   Electronically signed by Magnolia Russo MD(Performing   Physician) on 03/04/2021 12:09   ----------------------------------------------------------------    PCI procedure:Right Coronary, RCA, PTCA, BAYLEE      Conclusions      Patient tolerated the procedure well. Successful PCI using 3 overlapping drug Eluting Stents of the proximal   Right Coronary Artery with excellent angiographic outcome      Recommendations      Medical management. Aggressive risk factor management. Patient need to continue with aspirin and Plavix for at least 1 year   Follow-up with me in the office after 1 month, she may need   revascularization of the LAD diagonal lesions if she continued to have   symptoms      Signatures      ----------------------------------------------------------------   Electronically signed by Magnolia Russo MD(Performing   Physician) on 12/21/2020 20:58   ----------------------------------------------------------------        Assessment and Plan     Coronary artery disease with a prior intervention to the RCA with 3 drug-eluting stents when she presented with non-STEMI in June 2020  Patient has remaining coronary artery disease involving LAD and diagonal but seems to be stable on medical therapy, admitted this time with abdominal pain, nursing staff thinks because of constipation  EKG and troponin were nonrevealing.   2D echo reviewed and findings discussed with the team  No indication for need for any invasive work-up at this point    Patient is hemodynamically stable and can be discharged home from cardiac standpoint on medical therapy  Follow-up cardiology office after 6 weeks      Ashley Connor MD, [address] [facility] [address] Cardiologist, Endovascular Specialist   Medical Director, [address] [facility]

## 2021-06-23 NOTE — PROGRESS NOTES
Report called to [facility].  Electronically signed by Britany Moser RN on 6/23/2021 at 6:02 PM

## 2021-06-23 NOTE — PROGRESS NOTES
Freeman Lentz arrived to room # 35 20 43. Presented with: Chest pain  Mental Status: Patient is disoriented. Vitals:    06/22/21 2045   BP: (!) 144/63   Pulse: 91   Resp: 16   Temp:    SpO2: 92%     Patient safety contract and falls prevention contract reviewed with patient Yes. Oriented Patient and Family to room. Call light within reach. Yes.   Needs, issues or concerns expressed at this time: no.      Electronically signed by Carolynn Armenta RN on 6/22/2021 at 10:28 PM

## 2021-08-17 NOTE — PLAN OF CARE
Problem: Falls - Risk of:  Goal: Will remain free from falls  12/23/2020 1042 by Jerome Kelley  Outcome: Ongoing  12/23/2020 0517 by Jaquan Dockery RN  Outcome: Ongoing  Goal: Absence of physical injury  12/23/2020 1042 by Jerome Kelley  Outcome: Ongoing  12/23/2020 0517 by Jaquan Dockery RN  Outcome: Ongoing     Problem: Discharge Planning:  Goal: Discharged to appropriate level of care  12/23/2020 1042 by Jerome Kelley  Outcome: Ongoing  12/23/2020 0517 by Jaquan Dockery RN  Outcome: Ongoing     Problem: Cardiac Output - Decreased:  Goal: Hemodynamic stability will improve  12/23/2020 1042 by Jerome Kelley  Outcome: Ongoing  12/23/2020 0517 by Jaquan Dockery RN  Outcome: Ongoing     Problem: Serum Glucose Level - Abnormal:  Goal: Ability to maintain appropriate glucose levels will improve  12/23/2020 1042 by Jerome Kelley  Outcome: Ongoing  12/23/2020 0517 by Jaquan Dockery RN  Outcome: Ongoing     Problem: Pain:  Description: Pain management should include both nonpharmacologic and pharmacologic interventions.   Goal: Pain level will decrease  12/23/2020 1042 by Jerome Kelley  Outcome: Ongoing  12/23/2020 0517 by Jaquan Dockery RN  Outcome: Ongoing  Goal: Control of acute pain  12/23/2020 1042 by Jerome Kelley  Outcome: Ongoing  12/23/2020 0517 by Jaquan Dockery RN  Outcome: Ongoing  Goal: Control of chronic pain  12/23/2020 1042 by Jerome Kelley  Outcome: Ongoing  12/23/2020 0517 by Jaquan Dockery RN  Outcome: Ongoing     Problem: Tissue Perfusion - Cardiopulmonary, Altered:  Goal: Absence of angina  12/23/2020 1042 by Jerome Kelley  Outcome: Ongoing  12/23/2020 0517 by Jaquan Dockery RN  Outcome: Ongoing  Goal: Circulatory function within specified parameters  12/23/2020 1042 by Jerome Kelley  Outcome: Ongoing  12/23/2020 0517 by Jaquan Dockery RN  Outcome: Ongoing  Goal: Hemodynamic stability will improve  12/23/2020 1042 by Jerome Kelley  Outcome: Ongoing  12/23/2020 0517 by
Problem: Falls - Risk of:  Goal: Will remain free from falls  Description: Will remain free from falls  Outcome: Ongoing  Goal: Absence of physical injury  Description: Absence of physical injury  Outcome: Ongoing     Problem: Discharge Planning:  Goal: Discharged to appropriate level of care  Description: Discharged to appropriate level of care  Outcome: Ongoing     Problem: Cardiac Output - Decreased:  Goal: Hemodynamic stability will improve  Description: Hemodynamic stability will improve  Outcome: Ongoing     Problem: Serum Glucose Level - Abnormal:  Goal: Ability to maintain appropriate glucose levels will improve  Description: Ability to maintain appropriate glucose levels will improve  Outcome: Ongoing     Problem: Pain:  Goal: Pain level will decrease  Description: Pain level will decrease  Outcome: Ongoing  Goal: Control of acute pain  Description: Control of acute pain  Outcome: Ongoing  Goal: Control of chronic pain  Description: Control of chronic pain  Outcome: Ongoing     Problem: Tissue Perfusion - Cardiopulmonary, Altered:  Goal: Absence of angina  Description: Absence of angina  Outcome: Ongoing  Goal: Circulatory function within specified parameters  Description: Circulatory function within specified parameters  Outcome: Ongoing  Goal: Hemodynamic stability will improve  Description: Hemodynamic stability will improve  Outcome: Ongoing     Problem: Tobacco Use:  Goal: Will participate in inpatient tobacco-use cessation counseling  Description: Will participate in inpatient tobacco-use cessation counseling  Outcome: Ongoing
stated

## 2022-02-18 ENCOUNTER — HOSPITAL ENCOUNTER (EMERGENCY)
Age: 77
Discharge: HOME | End: 2022-02-18
Attending: EMERGENCY MEDICINE
Payer: MEDICARE

## 2022-02-18 ENCOUNTER — APPOINTMENT (OUTPATIENT)
Dept: CT IMAGING | Age: 77
End: 2022-02-18
Payer: MEDICARE

## 2022-02-18 VITALS
DIASTOLIC BLOOD PRESSURE: 69 MMHG | RESPIRATION RATE: 18 BRPM | HEART RATE: 95 BPM | HEIGHT: 62 IN | WEIGHT: 120 LBS | TEMPERATURE: 97.8 F | OXYGEN SATURATION: 93 % | BODY MASS INDEX: 22.08 KG/M2 | SYSTOLIC BLOOD PRESSURE: 132 MMHG

## 2022-02-18 DIAGNOSIS — N39.0 URINARY TRACT INFECTION WITHOUT HEMATURIA, SITE UNSPECIFIED: ICD-10-CM

## 2022-02-18 DIAGNOSIS — R11.2 NAUSEA AND VOMITING IN ADULT PATIENT: Primary | ICD-10-CM

## 2022-02-18 LAB
ALBUMIN SERPL-MCNC: 4.3 G/DL (ref 3.5–5.2)
ALP BLD-CCNC: 114 U/L (ref 35–104)
ALT SERPL-CCNC: 12 U/L (ref 5–33)
ANION GAP SERPL CALCULATED.3IONS-SCNC: 15 MMOL/L (ref 7–19)
AST SERPL-CCNC: 17 U/L (ref 5–32)
BACTERIA: ABNORMAL /HPF
BASOPHILS ABSOLUTE: 0 K/UL (ref 0–0.2)
BASOPHILS RELATIVE PERCENT: 0.3 % (ref 0–1)
BILIRUB SERPL-MCNC: <0.2 MG/DL (ref 0.2–1.2)
BILIRUBIN URINE: NEGATIVE
BLOOD, URINE: ABNORMAL
BUN BLDV-MCNC: 31 MG/DL (ref 8–23)
CALCIUM SERPL-MCNC: 9.1 MG/DL (ref 8.8–10.2)
CHLORIDE BLD-SCNC: 103 MMOL/L (ref 98–111)
CLARITY: CLEAR
CO2: 22 MMOL/L (ref 22–29)
COLOR: YELLOW
CREAT SERPL-MCNC: 1 MG/DL (ref 0.5–0.9)
CRYSTALS, UA: ABNORMAL /HPF
EOSINOPHILS ABSOLUTE: 0.1 K/UL (ref 0–0.6)
EOSINOPHILS RELATIVE PERCENT: 1 % (ref 0–5)
EPITHELIAL CELLS, UA: 2 /HPF (ref 0–5)
GFR AFRICAN AMERICAN: >59
GFR NON-AFRICAN AMERICAN: 54
GLUCOSE BLD-MCNC: 227 MG/DL (ref 74–109)
GLUCOSE URINE: NEGATIVE MG/DL
HCT VFR BLD CALC: 42.4 % (ref 37–47)
HEMOGLOBIN: 12.5 G/DL (ref 12–16)
HYALINE CASTS: 16 /HPF (ref 0–8)
IMMATURE GRANULOCYTES #: 0 K/UL
KETONES, URINE: NEGATIVE MG/DL
LEUKOCYTE ESTERASE, URINE: ABNORMAL
LIPASE: 381 U/L (ref 13–60)
LYMPHOCYTES ABSOLUTE: 0.4 K/UL (ref 1.1–4.5)
LYMPHOCYTES RELATIVE PERCENT: 4.9 % (ref 20–40)
MCH RBC QN AUTO: 22.9 PG (ref 27–31)
MCHC RBC AUTO-ENTMCNC: 29.5 G/DL (ref 33–37)
MCV RBC AUTO: 77.8 FL (ref 81–99)
MONOCYTES ABSOLUTE: 0.6 K/UL (ref 0–0.9)
MONOCYTES RELATIVE PERCENT: 7.7 % (ref 0–10)
NEUTROPHILS ABSOLUTE: 6.2 K/UL (ref 1.5–7.5)
NEUTROPHILS RELATIVE PERCENT: 85.8 % (ref 50–65)
NITRITE, URINE: POSITIVE
PDW BLD-RTO: 16.1 % (ref 11.5–14.5)
PH UA: 5 (ref 5–8)
PLATELET # BLD: 194 K/UL (ref 130–400)
PMV BLD AUTO: 9.8 FL (ref 9.4–12.3)
POTASSIUM REFLEX MAGNESIUM: 4.6 MMOL/L (ref 3.5–5)
PROTEIN UA: ABNORMAL MG/DL
RBC # BLD: 5.45 M/UL (ref 4.2–5.4)
RBC UA: 4 /HPF (ref 0–4)
SARS-COV-2, NAAT: NOT DETECTED
SODIUM BLD-SCNC: 140 MMOL/L (ref 136–145)
SPECIFIC GRAVITY UA: >=1.045 (ref 1–1.03)
TOTAL PROTEIN: 7.9 G/DL (ref 6.6–8.7)
TROPONIN: <0.01 NG/ML (ref 0–0.03)
UROBILINOGEN, URINE: 0.2 E.U./DL
WBC # BLD: 7.2 K/UL (ref 4.8–10.8)
WBC UA: 17 /HPF (ref 0–5)

## 2022-02-18 PROCEDURE — 85025 COMPLETE CBC W/AUTO DIFF WBC: CPT

## 2022-02-18 PROCEDURE — 96365 THER/PROPH/DIAG IV INF INIT: CPT

## 2022-02-18 PROCEDURE — 36415 COLL VENOUS BLD VENIPUNCTURE: CPT

## 2022-02-18 PROCEDURE — 99285 EMERGENCY DEPT VISIT HI MDM: CPT

## 2022-02-18 PROCEDURE — 93005 ELECTROCARDIOGRAM TRACING: CPT | Performed by: EMERGENCY MEDICINE

## 2022-02-18 PROCEDURE — 74177 CT ABD & PELVIS W/CONTRAST: CPT

## 2022-02-18 PROCEDURE — 87186 SC STD MICRODIL/AGAR DIL: CPT

## 2022-02-18 PROCEDURE — 84484 ASSAY OF TROPONIN QUANT: CPT

## 2022-02-18 PROCEDURE — 6360000002 HC RX W HCPCS: Performed by: EMERGENCY MEDICINE

## 2022-02-18 PROCEDURE — 87635 SARS-COV-2 COVID-19 AMP PRB: CPT

## 2022-02-18 PROCEDURE — 83690 ASSAY OF LIPASE: CPT

## 2022-02-18 PROCEDURE — 81001 URINALYSIS AUTO W/SCOPE: CPT

## 2022-02-18 PROCEDURE — 6360000004 HC RX CONTRAST MEDICATION: Performed by: EMERGENCY MEDICINE

## 2022-02-18 PROCEDURE — 80053 COMPREHEN METABOLIC PANEL: CPT

## 2022-02-18 PROCEDURE — 87086 URINE CULTURE/COLONY COUNT: CPT

## 2022-02-18 RX ORDER — HYDROCODONE BITARTRATE AND ACETAMINOPHEN 5; 325 MG/1; MG/1
1 TABLET ORAL NIGHTLY PRN
COMMUNITY

## 2022-02-18 RX ORDER — LEVOFLOXACIN 500 MG/1
500 TABLET, FILM COATED ORAL DAILY
Qty: 7 TABLET | Refills: 0 | Status: SHIPPED | OUTPATIENT
Start: 2022-02-18 | End: 2022-02-25

## 2022-02-18 RX ORDER — SERTRALINE HYDROCHLORIDE 25 MG/1
25 TABLET, FILM COATED ORAL EVERY MORNING
COMMUNITY

## 2022-02-18 RX ORDER — LOPERAMIDE HYDROCHLORIDE 2 MG/1
2 CAPSULE ORAL EVERY 8 HOURS PRN
COMMUNITY

## 2022-02-18 RX ORDER — ACETAMINOPHEN 500 MG
500 TABLET ORAL EVERY 4 HOURS PRN
COMMUNITY

## 2022-02-18 RX ORDER — LEVOFLOXACIN 5 MG/ML
500 INJECTION, SOLUTION INTRAVENOUS ONCE
Status: COMPLETED | OUTPATIENT
Start: 2022-02-18 | End: 2022-02-18

## 2022-02-18 RX ADMIN — LEVOFLOXACIN 500 MG: 5 INJECTION, SOLUTION INTRAVENOUS at 21:15

## 2022-02-18 RX ADMIN — IOPAMIDOL 80 ML: 755 INJECTION, SOLUTION INTRAVENOUS at 18:49

## 2022-02-18 NOTE — ED NOTES
Bed: 01-A  Expected date:   Expected time:   Means of arrival:   Comments:  [facility] MARIAH Ortega  02/18/22 2134

## 2022-02-18 NOTE — ED PROVIDER NOTES
[facility]  eMERGENCY dEPARTMENT eNCOUnter      Pt Name: Aaron Thacker  MRN: [id number]  [place] 1945  Date of evaluation: 2/18/2022  Provider: Nancy Luo MD    CHIEF COMPLAINT       Chief Complaint   Patient presents with    Nausea & Vomiting     vomiting up bowel smelling vomitus          HISTORY OF PRESENT ILLNESS   (Location/Symptom, Timing/Onset,Context/Setting, Quality, Duration, Modifying Factors, Severity)  Note limiting factors. Aaron Thacker is a 68 y.o. female who presents to the emergency department for nausea and vomiting as well as diarrhea. This is a 75-year-old female who is a resident of the nursing home. She has a history of severe intellectual disability, schizoaffective disorder and deafness. The history is obtained from her brother. The patient presents from the nursing home with a history of nausea vomiting and diarrhea. The patient herself is able to sign briefly with her brother as  and the patient does indicate that she has some upper abdominal pain. NursingNotes were reviewed.     REVIEW OF SYSTEMS    (2-9 systems for level 4, 10 or more for level 5)     Review of Systems   Unable to perform ROS: Other            PAST MEDICALHISTORY     Past Medical History:   Diagnosis Date    Anxiety     Arthritis     CAD (coronary artery disease)     Deaf     Delusional disorder ([address]     Diabetes mellitus ([address]     Dizziness     Dysphagia     Gastro-esophageal reflux     Giddiness     Hypertension     Lack of coordination     Major depressive disorder     Nonspeaking deaf     Pyelonephritis     Schizoaffective disorder ([address]     Severe intellectual disabilities          SURGICAL HISTORY       Past Surgical History:   Procedure Laterality Date    EYE SURGERY Bilateral          CURRENT MEDICATIONS     Previous Medications    ACETAMINOPHEN (TYLENOL) 500 MG TABLET    Take 500 mg by mouth every 4 hours as needed for Pain    ASPIRIN EC 81 MG EC TABLET    Take 1 tablet by mouth daily    ATORVASTATIN (LIPITOR) 40 MG TABLET    Take 1 tablet by mouth nightly    CLONAZEPAM (KLONOPIN) 0.5 MG TABLET    Take 1 tablet by mouth 3 times daily as needed for Anxiety for up to 3 days. CLOPIDOGREL (PLAVIX) 75 MG TABLET    Take 1 tablet by mouth daily    DOCUSATE SODIUM (COLACE) 100 MG CAPSULE    Take 100 mg by mouth 2 times daily    GLUCAGON 1 MG INJECTION    Infuse intravenously once    GLUCOSE 15 GM/32ML GEL    Take by mouth 2 times daily as needed    HYDROCHLOROTHIAZIDE (HYDRODIURIL) 25 MG TABLET    Take 25 mg by mouth daily    HYDROCODONE-ACETAMINOPHEN (NORCO) 5-325 MG PER TABLET    Take 1 tablet by mouth nightly as needed for Pain (osteoarthritis of hip). INSULIN ASPART (NOVOLOG) 100 UNIT/ML INJECTION CARTRIDGE    Inject 7 Units into the skin 2 times daily Do not give if pt does not eat breakfast or lunch; hold if bs less than 120    INSULIN GLARGINE (BASAGLAR KWIKPEN) 100 UNIT/ML INJECTION PEN    Inject 30 Units into the skin every morning     INSULIN LISPRO (HUMALOG) 100 UNIT/ML INJECTION VIAL    Inject 7 Units into the skin    LACTULOSE (CHRONULAC) 10 GM/15ML SOLUTION    Take 20 g by mouth daily    LOPERAMIDE (IMODIUM) 2 MG CAPSULE    Take 2 mg by mouth every 8 hours as needed for Diarrhea    METFORMIN (GLUCOPHAGE-XR) 500 MG EXTENDED RELEASE TABLET    Take 1,000 mg by mouth 2 times daily     METOPROLOL SUCCINATE (TOPROL XL) 50 MG EXTENDED RELEASE TABLET    Take 50 mg by mouth daily    MICONAZOLE (MICOTIN) 2 % POWDER    Apply topically 2 times daily.     PANTOPRAZOLE (PROTONIX) 40 MG TABLET        RISPERIDONE (RISPERDAL) 0.25 MG TABLET    Take 0.25 mg by mouth daily At 1400    RISPERIDONE (RISPERDAL) 1 MG TABLET    Take 1.25 mg by mouth daily 0800    SERTRALINE (ZOLOFT) 100 MG TABLET    Take 100 mg by mouth daily    SERTRALINE (ZOLOFT) 25 MG TABLET    Take 25 mg by mouth every morning    SIMETHICONE (MYLICON) 80 MG CHEWABLE TABLET    Take 80 mg by mouth every 6 hours as needed for Flatulence       ALLERGIES     Patient has no known allergies. FAMILY HISTORY       Family History   Problem Relation Age of Onset    Heart Disease Father     Diabetes type 2  Sister           SOCIAL HISTORY       Social History     Socioeconomic History    Marital status: Single     Spouse name: None    Number of children: None    Years of education: None    Highest education level: None   Occupational History    None   Tobacco Use    Smoking status: Never Smoker    Smokeless tobacco: Never Used   Vaping Use    Vaping Use: Never used   Substance and Sexual Activity    Alcohol use: Never    Drug use: Never    Sexual activity: Never   Other Topics Concern    None   Social History Narrative    Advance Directive: Full Code    Health Care Proxy: her sister, Mrs. Pardeep Meehan, [phone]     Social Determinants of Health     Financial Resource Strain:     Difficulty of Paying Living Expenses: Not on file   Food Insecurity:     Worried About Running Out of Food in the Last Year: Not on file    Servando of Food in the Last Year: Not on file   Transportation Needs:     Lack of Transportation (Medical): Not on file    Lack of Transportation (Non-Medical):  Not on file   Physical Activity:     Days of Exercise per Week: Not on file    Minutes of Exercise per Session: Not on file   Stress:     Feeling of Stress : Not on file   Social Connections:     Frequency of Communication with Friends and Family: Not on file    Frequency of Social Gatherings with Friends and Family: Not on file    Attends [employer] Services: Not on file    Active Member of Clubs or Organizations: Not on file    Attends Club or Organization Meetings: Not on file    Marital Status: Not on file   Intimate Partner Violence:     Fear of Current or Ex-Partner: Not on file    Emotionally Abused: Not on file    Physically Abused: Not on file    Sexually Abused: Not on file   Housing Stability:     Unable to Pay for Housing in the Last Year: Not on file    Number of Places Lived in the Last Year: Not on file    Unstable Housing in the Last Year: Not on file       SCREENINGS    [place] Coma Scale  Eye Opening: Spontaneous  Best Verbal Response: Confused  Best Motor Response: Obeys commands  [place] Coma Scale Score: 14        PHYSICAL EXAM    (up to 7 for level 4, 8 or more for level 5)     ED Triage Vitals   BP Temp Temp Source Pulse Resp SpO2 Height Weight   02/18/22 1620 02/18/22 1615 02/18/22 1615 02/18/22 1615 02/18/22 1615 02/18/22 1615 02/18/22 1614 02/18/22 1614   114/67 97.8 °F (36.6 °C) Oral 95 18 91 % 5' 2\" (1.575 m) 120 lb (54.4 kg)       Physical Exam  Vitals and nursing note reviewed. Constitutional:       General: She is not in acute distress. Appearance: Normal appearance. She is not ill-appearing or toxic-appearing. Comments: Appears anxious   HENT:      Head: Normocephalic and atraumatic. Right Ear: External ear normal.      Left Ear: External ear normal.   Eyes:      Extraocular Movements: Extraocular movements intact. Conjunctiva/sclera: Conjunctivae normal.      Pupils: Pupils are equal, round, and reactive to light. Comments: The patient's eye exam shows that there is a lateral strabismus of the left eye   Cardiovascular:      Rate and Rhythm: Normal rate and regular rhythm. Pulses: Normal pulses. Heart sounds: Normal heart sounds. No murmur heard. Pulmonary:      Effort: Pulmonary effort is normal. No respiratory distress. Breath sounds: Normal breath sounds. No stridor. No wheezing, rhonchi or rales. Abdominal:      General: Abdomen is flat. Bowel sounds are decreased. There is no distension. Palpations: Abdomen is soft. There is no mass. Tenderness: There is generalized abdominal tenderness. There is no guarding or rebound.       Comments: Mild generalized tenderness   Musculoskeletal:         General: No swelling, tenderness, deformity or signs of injury. Normal range of motion. Skin:     General: Skin is warm. Capillary Refill: Capillary refill takes less than 2 seconds. Findings: No lesion or rash. Neurological:      General: No focal deficit present. Mental Status: She is alert and oriented to person, place, and time. Mental status is at baseline. GCS: GCS eye subscore is 4. GCS motor subscore is 6. Cranial Nerves: Cranial nerve deficit (Patient is deaf) present. Sensory: No sensory deficit. Motor: Motor function is intact. No weakness, tremor or seizure activity. Coordination: Coordination normal.      Gait: Gait normal.      Deep Tendon Reflexes: Reflexes normal.      Comments: Patient is nonverbal however uses sign language to answer questions. DIAGNOSTIC RESULTS     EKG: All EKG's areinterpreted by the Emergency Department Physician who either signs or Co-signs this chart in the absence of a cardiologist.    EKG: sinus tachycardia, PVCs, LBBB, PVC's, subtle ST depression in V6  Comparison EKG 6/22/2021 - V6 changes are new      RADIOLOGY:  Non-plain film images such as CT, Ultrasound and MRI are read by the radiologist. Plain radiographic images are visualized and preliminarily interpreted bythe emergency physician with the below findings:          CT ABDOMEN PELVIS W IV CONTRAST Additional Contrast? None   Final Result   Findings are consistent with enterocolitis.    Signed by Dr Magaña Livers:  Results for orders placed or performed during the hospital encounter of 02/18/22   COVID-19, Rapid    Specimen: Nasopharyngeal Swab   Result Value Ref Range    SARS-CoV-2, NAAT Not Detected Not Detected   Comprehensive Metabolic Panel w/ Reflex to MG   Result Value Ref Range    Sodium 140 136 - 145 mmol/L    Potassium reflex Magnesium 4.6 3.5 - 5.0 mmol/L    Chloride 103 98 - 111 mmol/L    CO2 22 22 - 29 mmol/L    Anion Gap 15 7 - 19 mmol/L    Glucose 227 (H) 74 - 109 mg/dL    BUN 31 (H) 8 - 23 mg/dL    CREATININE 1.0 (H) 0.5 - 0.9 mg/dL    GFR Non-African American 54 (A) >60    GFR African American >59 >59    Calcium 9.1 8.8 - 10.2 mg/dL    Total Protein 7.9 6.6 - 8.7 g/dL    Albumin 4.3 3.5 - 5.2 g/dL    Total Bilirubin <0.2 0.2 - 1.2 mg/dL    Alkaline Phosphatase 114 (H) 35 - 104 U/L    ALT 12 5 - 33 U/L    AST 17 5 - 32 U/L   CBC with Auto Differential   Result Value Ref Range    WBC 7.2 4.8 - 10.8 K/uL    RBC 5.45 (H) 4.20 - 5.40 M/uL    Hemoglobin 12.5 12.0 - 16.0 g/dL    Hematocrit 42.4 37.0 - 47.0 %    MCV 77.8 (L) 81.0 - 99.0 fL    MCH 22.9 (L) 27.0 - 31.0 pg    MCHC 29.5 (L) 33.0 - 37.0 g/dL    RDW 16.1 (H) 11.5 - 14.5 %    Platelets 599 436 - 531 K/uL    MPV 9.8 9.4 - 12.3 fL    Neutrophils % 85.8 (H) 50.0 - 65.0 %    Lymphocytes % 4.9 (L) 20.0 - 40.0 %    Monocytes % 7.7 0.0 - 10.0 %    Eosinophils % 1.0 0.0 - 5.0 %    Basophils % 0.3 0.0 - 1.0 %    Neutrophils Absolute 6.2 1.5 - 7.5 K/uL    Immature Granulocytes # 0.0 K/uL    Lymphocytes Absolute 0.4 (L) 1.1 - 4.5 K/uL    Monocytes Absolute 0.60 0.00 - 0.90 K/uL    Eosinophils Absolute 0.10 0.00 - 0.60 K/uL    Basophils Absolute 0.00 0.00 - 0.20 K/uL   Lipase   Result Value Ref Range    Lipase 381 (H) 13 - 60 U/L   Troponin   Result Value Ref Range    Troponin <0.01 0.00 - 0.03 ng/mL   Urinalysis with Reflex to Culture    Specimen: Urine   Result Value Ref Range    Color, UA YELLOW Straw/Yellow    Clarity, UA Clear Clear    Glucose, Ur Negative Negative mg/dL    Bilirubin Urine Negative Negative    Ketones, Urine Negative Negative mg/dL    Specific Gravity, UA >=1.045 1.005 - 1.030    Blood, Urine SMALL (A) Negative    pH, UA 5.0 5.0 - 8.0    Protein, UA TRACE (A) Negative mg/dL    Urobilinogen, Urine 0.2 <2.0 E.U./dL    Nitrite, Urine POSITIVE (A) Negative    Leukocyte Esterase, Urine TRACE (A) Negative   Microscopic Urinalysis   Result Value Ref Range    Bacteria, UA 4+ (A) None Seen /HPF    Crystals, UA NEG (A) Tolerating p.o. here. CT without evidence of obstruction or acute inflammatory/surgical process. Patient happy, smiling, comfortable. Brother at bedside and comfortable with plan for discharge. Patient Progress  Patient progress: improved      Reassessment  9:13 PM CST  Patient alert, smiling. Brother at bedside stating patient is anxious to return to her facility. Results of CT and labs discussed with brother. He is requesting a COVID test as well. CONSULTS:  None    PROCEDURES:  Unless otherwise noted below, none     Procedures    FINAL IMPRESSION      1. Nausea and vomiting in adult patient    2.  Urinary tract infection without hematuria, site unspecified          DISPOSITION/PLAN   DISPOSITION        PATIENT REFERRED TO:  Your Primary Care Physician    In 5 days      [facility] EMERGENCY DEPT  [facility]  [phone]    Immediately if you get worse      DISCHARGE MEDICATIONS:  New Prescriptions    LEVOFLOXACIN (LEVAQUIN) 500 MG TABLET    Take 1 tablet by mouth daily for 7 days          (Please note that portions of this note were completed with a voice recognition program.  Efforts were made to edit thedictations but occasionally words are mis-transcribed.)    Merrill Red MD (electronically signed)  Attending Emergency Physician       Merrill Red MD  02/18/22 9588

## 2022-02-19 NOTE — ED NOTES
Report called to [address] [place] [address] Niki Feng); family will be driving pt home;       Goldie Day, MARIAH  02/18/22 [address] [place] [address], RN  02/18/22 4664

## 2022-02-21 LAB
EKG P AXIS: 74 DEGREES
EKG P-R INTERVAL: 134 MS
EKG Q-T INTERVAL: 352 MS
EKG QRS DURATION: 114 MS
EKG QTC CALCULATION (BAZETT): 422 MS
EKG T AXIS: -1 DEGREES
ORGANISM: ABNORMAL
URINE CULTURE, ROUTINE: ABNORMAL
URINE CULTURE, ROUTINE: ABNORMAL

## 2022-03-18 ENCOUNTER — HOSPITAL ENCOUNTER (EMERGENCY)
Age: 77
Discharge: HOME | End: 2022-03-18
Payer: MEDICARE

## 2022-03-18 VITALS
DIASTOLIC BLOOD PRESSURE: 73 MMHG | RESPIRATION RATE: 20 BRPM | SYSTOLIC BLOOD PRESSURE: 125 MMHG | HEART RATE: 88 BPM | HEIGHT: 62 IN | WEIGHT: 120 LBS | TEMPERATURE: 97.7 F | OXYGEN SATURATION: 97 % | BODY MASS INDEX: 22.08 KG/M2

## 2022-03-18 DIAGNOSIS — E16.2 HYPOGLYCEMIA: Primary | ICD-10-CM

## 2022-03-18 DIAGNOSIS — D64.9 ANEMIA, UNSPECIFIED TYPE: ICD-10-CM

## 2022-03-18 LAB
ALBUMIN SERPL-MCNC: 3.7 G/DL (ref 3.5–5.2)
ALP BLD-CCNC: 102 U/L (ref 35–104)
ALT SERPL-CCNC: 11 U/L (ref 5–33)
ANION GAP SERPL CALCULATED.3IONS-SCNC: 13 MMOL/L (ref 7–19)
AST SERPL-CCNC: 21 U/L (ref 5–32)
BASOPHILS ABSOLUTE: 0 K/UL (ref 0–0.2)
BASOPHILS RELATIVE PERCENT: 0.3 % (ref 0–1)
BILIRUB SERPL-MCNC: <0.2 MG/DL (ref 0.2–1.2)
BUN BLDV-MCNC: 27 MG/DL (ref 8–23)
CALCIUM SERPL-MCNC: 9.2 MG/DL (ref 8.8–10.2)
CHLORIDE BLD-SCNC: 96 MMOL/L (ref 98–111)
CO2: 26 MMOL/L (ref 22–29)
CREAT SERPL-MCNC: 0.8 MG/DL (ref 0.5–0.9)
EOSINOPHILS ABSOLUTE: 0.1 K/UL (ref 0–0.6)
EOSINOPHILS RELATIVE PERCENT: 0.8 % (ref 0–5)
GFR AFRICAN AMERICAN: >59
GFR NON-AFRICAN AMERICAN: >60
GLUCOSE BLD-MCNC: 222 MG/DL (ref 74–109)
GLUCOSE BLD-MCNC: 224 MG/DL (ref 70–99)
HBA1C MFR BLD: 7.6 % (ref 4–6)
HCT VFR BLD CALC: 33 % (ref 37–47)
HEMOGLOBIN: 9.9 G/DL (ref 12–16)
IMMATURE GRANULOCYTES #: 0 K/UL
LYMPHOCYTES ABSOLUTE: 0.7 K/UL (ref 1.1–4.5)
LYMPHOCYTES RELATIVE PERCENT: 7 % (ref 20–40)
MCH RBC QN AUTO: 22.9 PG (ref 27–31)
MCHC RBC AUTO-ENTMCNC: 30 G/DL (ref 33–37)
MCV RBC AUTO: 76.4 FL (ref 81–99)
MONOCYTES ABSOLUTE: 0.9 K/UL (ref 0–0.9)
MONOCYTES RELATIVE PERCENT: 8.6 % (ref 0–10)
NEUTROPHILS ABSOLUTE: 8.7 K/UL (ref 1.5–7.5)
NEUTROPHILS RELATIVE PERCENT: 82.9 % (ref 50–65)
PDW BLD-RTO: 16.5 % (ref 11.5–14.5)
PERFORMED ON: ABNORMAL
PLATELET # BLD: 175 K/UL (ref 130–400)
PMV BLD AUTO: 9.5 FL (ref 9.4–12.3)
POTASSIUM REFLEX MAGNESIUM: 4.2 MMOL/L (ref 3.5–5)
RBC # BLD: 4.32 M/UL (ref 4.2–5.4)
SODIUM BLD-SCNC: 135 MMOL/L (ref 136–145)
TOTAL PROTEIN: 7.4 G/DL (ref 6.6–8.7)
WBC # BLD: 10.5 K/UL (ref 4.8–10.8)

## 2022-03-18 PROCEDURE — 82947 ASSAY GLUCOSE BLOOD QUANT: CPT

## 2022-03-18 PROCEDURE — 99283 EMERGENCY DEPT VISIT LOW MDM: CPT

## 2022-03-18 PROCEDURE — 80053 COMPREHEN METABOLIC PANEL: CPT

## 2022-03-18 PROCEDURE — 36415 COLL VENOUS BLD VENIPUNCTURE: CPT

## 2022-03-18 PROCEDURE — 93005 ELECTROCARDIOGRAM TRACING: CPT | Performed by: NURSE PRACTITIONER

## 2022-03-18 PROCEDURE — 83036 HEMOGLOBIN GLYCOSYLATED A1C: CPT

## 2022-03-18 PROCEDURE — 85025 COMPLETE CBC W/AUTO DIFF WBC: CPT

## 2022-03-18 NOTE — ED PROVIDER NOTES
[facility] EMERGENCY DEPT  eMERGENCY dEPARTMENT eNCOUnter      Pt Name: Teo Lopez  MRN: [id number]  [place] 1945  Date of evaluation: 3/18/2022  Provider: Noe Nunez, [address]       Chief Complaint   Patient presents with    Hypoglycemia     received IM glucagon, blood sugar was 51         HISTORY OF PRESENT ILLNESS   (Location/Symptom, Timing/Onset,Context/Setting, Quality, Duration, Modifying Factors, Severity)  Note limiting factors. Teo Lopez is a 68 y.o. female who presents to the emergency department by ambulance with a low blood sugar. Patient was unresponsive and received glucagon by EMS. Family states that she does not always wake up for breakfast and then the nurses give her insulin. They say this has happened before. Patient is alert and oriented here now. She is able to communicate through her family only as she is deaf and mute but signs. Pt asked for food and has eaten while here              The history is provided by the patient. Hypoglycemia  Initial blood sugar:  51  Blood sugar after intervention:  120  Severity:  Moderate  Onset quality:  Sudden  Chronicity:  Recurrent  Diabetic status:  Controlled with insulin  Current diabetic therapy:  Basal insulin, novolog, glucophage  Context: decreased oral intake        NursingNotes were reviewed. REVIEW OF SYSTEMS    (2-9 systems for level 4, 10 or more for level 5)     Review of Systems    Except as noted above the remainder of the review of systems was reviewed and negative.        PAST MEDICAL HISTORY     Past Medical History:   Diagnosis Date    Anxiety     Arthritis     CAD (coronary artery disease)     Deaf     Delus[address] disorder ([facility] [address]     Diabetes mellitus ([address]     Dizziness     Dysphagia     Gastro-esophageal reflux     Giddiness     Hypertension     Lack of coordination     Major depressive disorder     Nonspeaking deaf     Pyelonephritis     Schizoaffective disorder ([address]     Severe intellectual disabilities          SURGICALHISTORY       Past Surgical History:   Procedure Laterality Date    EYE SURGERY Bilateral          CURRENT MEDICATIONS       Discharge Medication List as of 3/18/2022  5:33 PM      CONTINUE these medications which have NOT CHANGED    Details   acetaminophen (TYLENOL) 500 MG tablet Take 500 mg by mouth every 4 hours as needed for PainHistorical Med      loperamide (IMODIUM) 2 MG capsule Take 2 mg by mouth every 8 hours as needed for DiarrheaHistorical Med      HYDROcodone-acetaminophen (NORCO) 5-325 MG per tablet Take 1 tablet by mouth nightly as needed for Pain (osteoarthritis of hip). Historical Med      insulin aspart (NOVOLOG) 100 UNIT/ML injection cartridge Inject 7 Units into the skin 2 times daily Do not give if pt does not eat breakfast or lunch; hold if bs less than 120Historical Med      !! sertraline (ZOLOFT) 25 MG tablet Take 25 mg by mouth every morningHistorical Med      clonazePAM (KLONOPIN) 0.5 MG tablet Take 1 tablet by mouth 3 times daily as needed for Anxiety for up to 3 days. , Disp-9 tablet, R-0Print      pantoprazole (PROTONIX) 40 MG tablet Historical Med      Glucose 15 GM/32ML GEL Take by mouth 2 times daily as neededHistorical Med      glucagon 1 MG injection Infuse intravenously onceHistorical Med      insulin glargine (BASAGLAR KWIKPEN) 100 UNIT/ML injection pen Inject 30 Units into the skin every morning Historical Med      hydroCHLOROthiazide (HYDRODIURIL) 25 MG tablet Take 25 mg by mouth dailyHistorical Med      aspirin EC 81 MG EC tablet Take 1 tablet by mouth daily, Disp-90 tablet, R-3Print      atorvastatin (LIPITOR) 40 MG tablet Take 1 tablet by mouth nightly, Disp-30 tablet, R-3Print      miconazole (MICOTIN) 2 % powder Apply topically 2 times daily. , Disp-45 g, R-1, Print      clopidogrel (PLAVIX) 75 MG tablet Take 1 tablet by mouth daily, Disp-30 tablet, R-3Print      insulin lispro (HUMALOG) 100 UNIT/ML injection vial Inject 7 Units into the skinHistorical Med      !! sertraline (ZOLOFT) 100 MG tablet Take 100 mg by mouth dailyHistorical Med      !! risperiDONE (RISPERDAL) 1 MG tablet Take 1.25 mg by mouth daily 0800Historical Med      !! risperiDONE (RISPERDAL) 0.25 MG tablet Take 0.25 mg by mouth daily At 1400Historical Med      docusate sodium (COLACE) 100 MG capsule Take 100 mg by mouth 2 times dailyHistorical Med      lactulose (CHRONULAC) 10 GM/15ML solution Take 20 g by mouth dailyHistorical Med      simethicone (MYLICON) 80 MG chewable tablet Take 80 mg by mouth every 6 hours as needed for FlatulenceHistorical Med      metoprolol succinate (TOPROL XL) 50 MG extended release tablet Take 50 mg by mouth dailyHistorical Med      metFORMIN (GLUCOPHAGE-XR) 500 MG extended release tablet Take 1,000 mg by mouth 2 times daily Historical Med       !! - Potential duplicate medications found. Please discuss with provider. ALLERGIES     Patient has no known allergies.     FAMILY HISTORY       Family History   Problem Relation Age of Onset    Heart Disease Father     Diabetes type 2  Sister           SOCIAL HISTORY       Social History     Socioeconomic History    Marital status: Single     Spouse name: None    Number of children: None    Years of education: None    Highest education level: None   Occupational History    None   Tobacco Use    Smoking status: Never Smoker    Smokeless tobacco: Never Used   Vaping Use    Vaping Use: Never used   Substance and Sexual Activity    Alcohol use: Never    Drug use: Never    Sexual activity: Never   Other Topics Concern    None   Social History Narrative    Advance Directive: Full Code    Health Care Proxy: her sister, Mrs. Demetris Yeager, [phone]     Social Determinants of Health     Financial Resource Strain:     Difficulty of Paying Living Expenses: Not on file   Food Insecurity:     Worried About Running Out of Food in the Last Year: Not on file    [address] in the Last Year: Not on file   Transportation Needs:     Lack of Transportation (Medical): Not on file    Lack of Transportation (Non-Medical): Not on file   Physical Activity:     Days of Exercise per Week: Not on file    Minutes of Exercise per Session: Not on file   Stress:     Feeling of Stress : Not on file   Social Connections:     Frequency of Communication with Friends and Family: Not on file    Frequency of Social Gatherings with Friends and Family: Not on file    Attends L[employer] Services: Not on file    Active Member of [address] [place] [address] [address] or Organizations: Not on file    Attends Club or Organization Meetings: Not on file    Marital Status: Not on file   Intimate Partner Violence:     Fear of Current or Ex-Partner: Not on file    Emotionally Abused: Not on file    Physically Abused: Not on file    Sexually Abused: Not on file   Housing Stability:     Unable to Pay for Housing in the Last Year: Not on file    Number of [place] in the Last Year: Not on file    Unstable Housing in the Last Year: Not on file       SCREENINGS      @FLOW([id number])@      PHYSICAL EXAM    (up to 7 for level 4, 8 or more for level 5)     ED Triage Vitals [03/18/22 1425]   BP Temp Temp src Pulse Resp SpO2 Height Weight   125/73 97.7 °F (36.5 °C) -- 88 20 97 % 5' 2\" (1.575 m) 120 lb (54.4 kg)       Physical Exam  Vitals and nursing note reviewed. Exam conducted with a chaperone present. Constitutional:       Appearance: She is well-developed. HENT:      Head: Normocephalic and atraumatic. Eyes:      General: No scleral icterus. Right eye: No discharge. Left eye: No discharge. Cardiovascular:      Rate and Rhythm: Normal rate and regular rhythm. Heart sounds: Normal heart sounds. Pulmonary:      Effort: No respiratory distress. Genitourinary:     Rectum: Guaiac result negative. Comments: Brown stool  Musculoskeletal:      Cervical back: Normal range of motion and neck supple.    Neurological: Mental Status: She is alert. Psychiatric:         Behavior: Behavior normal.         DIAGNOSTIC RESULTS     EKG: All EKG's are interpreted by the Emergency Department Physician who either signs or Co-signsthis chart in the absence of a cardiologist.  87 sinus rhythm left bundle branch block PVC read at 1440 by Dr. King cole present 6/21      RADIOLOGY:   Dimple Hung such as CT, Ultrasound and MRI are read by the radiologist. Wilhelminia Aracelis radiographic images are visualized and preliminarily interpreted by the emergency physician with the below findings:      Interpretation per the Radiologist below, if available at the time of this note:    No orders to display         ED BEDSIDEULTRASOUND:   Performed by ED Physician -none    LABS:  Labs Reviewed   CBC WITH AUTO DIFFERENTIAL - Abnormal; Notable for the following components:       Result Value    Hemoglobin 9.9 (*)     Hematocrit 33.0 (*)     MCV 76.4 (*)     MCH 22.9 (*)     MCHC 30.0 (*)     RDW 16.5 (*)     Neutrophils % 82.9 (*)     Lymphocytes % 7.0 (*)     Neutrophils Absolute 8.7 (*)     Lymphocytes Absolute 0.7 (*)     All other components within normal limits   COMPREHENSIVE METABOLIC PANEL W/ REFLEX TO MG FOR LOW K - Abnormal; Notable for the following components:    Sodium 135 (*)     Chloride 96 (*)     Glucose 222 (*)     BUN 27 (*)     All other components within normal limits   HEMOGLOBIN A1C - Abnormal; Notable for the following components:    Hemoglobin A1C 7.6 (*)     All other components within normal limits   POCT GLUCOSE - Abnormal; Notable for the following components:    POC Glucose 224 (*)     All other components within normal limits       All other labs were within normal range or not returned as of this dictation.     EMERGENCY DEPARTMENT COURSE and DIFFERENTIALDIAGNOSIS/MDM:   Vitals:    Vitals:    03/18/22 1425   BP: 125/73   Pulse: 88   Resp: 20   Temp: 97.7 °F (36.5 °C)   SpO2: 97%   Weight: 120 lb (54.4 kg)   Height: 5' 2\" (1.575 m)           MDM  Hemoglobin a1c not that bad   Will d/c glucophage until she can be seen by her pcp      CONSULTS:  None    PROCEDURES:  Unless otherwise noted below, none     Procedures    FINAL IMPRESSION      1. Hypoglycemia    2.  Anemia, unspecified type        DISPOSITION/PLAN   DISPOSITION Decision To Discharge 03/18/2022 04:13:54 PM      PATIENT REFERRED TO:  MD Sonya ZepedaDevon [place] [address] [id number] [address]    Schedule an appointment as soon as possible for a visit         DISCHARGE MEDICATIONS:  Discharge Medication List as of 3/18/2022  5:33 PM             (Please note that portions of this note were completed with a voice recognitionprogram.  Efforts were made to edit the dictations but occasionally words are mis-transcribed.)    MEKA Dominguez (electronically signed)          MEKA Dominguez  03/18/22 9125

## 2022-03-18 NOTE — ED NOTES
Cornel Boogie called report to [place]. Spoke with MARIAH santiago.       Ck Mireles RN  03/18/22 9929

## 2022-03-21 LAB
EKG P AXIS: 58 DEGREES
EKG P-R INTERVAL: 150 MS
EKG Q-T INTERVAL: 394 MS
EKG QRS DURATION: 132 MS
EKG QTC CALCULATION (BAZETT): 441 MS
EKG T AXIS: 128 DEGREES

## 2022-03-21 PROCEDURE — 93010 ELECTROCARDIOGRAM REPORT: CPT | Performed by: INTERNAL MEDICINE

## 2022-03-29 ENCOUNTER — HOSPITAL ENCOUNTER (EMERGENCY)
Age: 77
Discharge: HOME | End: 2022-03-29
Attending: EMERGENCY MEDICINE
Payer: MEDICARE

## 2022-03-29 ENCOUNTER — APPOINTMENT (OUTPATIENT)
Dept: CT IMAGING | Age: 77
End: 2022-03-29
Payer: MEDICARE

## 2022-03-29 ENCOUNTER — APPOINTMENT (OUTPATIENT)
Dept: GENERAL RADIOLOGY | Age: 77
End: 2022-03-29
Payer: MEDICARE

## 2022-03-29 VITALS
DIASTOLIC BLOOD PRESSURE: 67 MMHG | OXYGEN SATURATION: 99 % | BODY MASS INDEX: 21.03 KG/M2 | TEMPERATURE: 97.8 F | HEART RATE: 81 BPM | RESPIRATION RATE: 18 BRPM | SYSTOLIC BLOOD PRESSURE: 141 MMHG | WEIGHT: 115 LBS

## 2022-03-29 DIAGNOSIS — S60.212A CONTUSION OF LEFT WRIST, INITIAL ENCOUNTER: ICD-10-CM

## 2022-03-29 DIAGNOSIS — S10.93XA CONTUSION OF NECK, INITIAL ENCOUNTER: ICD-10-CM

## 2022-03-29 DIAGNOSIS — S09.90XA INJURY OF HEAD, INITIAL ENCOUNTER: Primary | ICD-10-CM

## 2022-03-29 DIAGNOSIS — S20.211A CONTUSION OF RIGHT CHEST WALL, INITIAL ENCOUNTER: ICD-10-CM

## 2022-03-29 LAB
ALBUMIN SERPL-MCNC: 4.1 G/DL (ref 3.5–5.2)
ALP BLD-CCNC: 97 U/L (ref 35–104)
ALT SERPL-CCNC: 10 U/L (ref 5–33)
ANION GAP SERPL CALCULATED.3IONS-SCNC: 15 MMOL/L (ref 7–19)
APTT: 26.9 SEC (ref 26–36.2)
AST SERPL-CCNC: 16 U/L (ref 5–32)
BACTERIA: NEGATIVE /HPF
BASOPHILS ABSOLUTE: 0 K/UL (ref 0–0.2)
BASOPHILS RELATIVE PERCENT: 0.7 % (ref 0–1)
BILIRUB SERPL-MCNC: <0.2 MG/DL (ref 0.2–1.2)
BILIRUBIN URINE: NEGATIVE
BLOOD, URINE: NEGATIVE
BUN BLDV-MCNC: 27 MG/DL (ref 8–23)
CALCIUM SERPL-MCNC: 9.6 MG/DL (ref 8.8–10.2)
CHLORIDE BLD-SCNC: 101 MMOL/L (ref 98–111)
CLARITY: CLEAR
CO2: 28 MMOL/L (ref 22–29)
COLOR: YELLOW
CREAT SERPL-MCNC: 0.9 MG/DL (ref 0.5–0.9)
CRYSTALS, UA: ABNORMAL /HPF
EOSINOPHILS ABSOLUTE: 0.1 K/UL (ref 0–0.6)
EOSINOPHILS RELATIVE PERCENT: 2.4 % (ref 0–5)
EPITHELIAL CELLS, UA: 0 /HPF (ref 0–5)
GFR AFRICAN AMERICAN: >59
GFR NON-AFRICAN AMERICAN: >60
GLUCOSE BLD-MCNC: 137 MG/DL (ref 74–109)
GLUCOSE URINE: NEGATIVE MG/DL
HCT VFR BLD CALC: 34.2 % (ref 37–47)
HEMOGLOBIN: 10.1 G/DL (ref 12–16)
HYALINE CASTS: 0 /HPF (ref 0–8)
IMMATURE GRANULOCYTES #: 0 K/UL
INR BLD: 0.97 (ref 0.88–1.18)
KETONES, URINE: NEGATIVE MG/DL
LEUKOCYTE ESTERASE, URINE: ABNORMAL
LYMPHOCYTES ABSOLUTE: 1.4 K/UL (ref 1.1–4.5)
LYMPHOCYTES RELATIVE PERCENT: 24.3 % (ref 20–40)
MCH RBC QN AUTO: 22.9 PG (ref 27–31)
MCHC RBC AUTO-ENTMCNC: 29.5 G/DL (ref 33–37)
MCV RBC AUTO: 77.6 FL (ref 81–99)
MONOCYTES ABSOLUTE: 0.5 K/UL (ref 0–0.9)
MONOCYTES RELATIVE PERCENT: 8.3 % (ref 0–10)
NEUTROPHILS ABSOLUTE: 3.7 K/UL (ref 1.5–7.5)
NEUTROPHILS RELATIVE PERCENT: 64.1 % (ref 50–65)
NITRITE, URINE: NEGATIVE
PDW BLD-RTO: 16.5 % (ref 11.5–14.5)
PH UA: 6 (ref 5–8)
PLATELET # BLD: 208 K/UL (ref 130–400)
PMV BLD AUTO: 9.1 FL (ref 9.4–12.3)
POTASSIUM REFLEX MAGNESIUM: 4.6 MMOL/L (ref 3.5–5)
PROTEIN UA: NEGATIVE MG/DL
PROTHROMBIN TIME: 13.1 SEC (ref 12–14.6)
RBC # BLD: 4.41 M/UL (ref 4.2–5.4)
RBC UA: 0 /HPF (ref 0–4)
SODIUM BLD-SCNC: 144 MMOL/L (ref 136–145)
SPECIFIC GRAVITY UA: 1.01 (ref 1–1.03)
TOTAL PROTEIN: 7.1 G/DL (ref 6.6–8.7)
UROBILINOGEN, URINE: 0.2 E.U./DL
WBC # BLD: 5.8 K/UL (ref 4.8–10.8)
WBC UA: 4 /HPF (ref 0–5)

## 2022-03-29 PROCEDURE — 72125 CT NECK SPINE W/O DYE: CPT

## 2022-03-29 PROCEDURE — 85730 THROMBOPLASTIN TIME PARTIAL: CPT

## 2022-03-29 PROCEDURE — 99283 EMERGENCY DEPT VISIT LOW MDM: CPT

## 2022-03-29 PROCEDURE — 70450 CT HEAD/BRAIN W/O DYE: CPT

## 2022-03-29 PROCEDURE — 6360000004 HC RX CONTRAST MEDICATION: Performed by: EMERGENCY MEDICINE

## 2022-03-29 PROCEDURE — 85025 COMPLETE CBC W/AUTO DIFF WBC: CPT

## 2022-03-29 PROCEDURE — 81001 URINALYSIS AUTO W/SCOPE: CPT

## 2022-03-29 PROCEDURE — 71260 CT THORAX DX C+: CPT

## 2022-03-29 PROCEDURE — 36415 COLL VENOUS BLD VENIPUNCTURE: CPT

## 2022-03-29 PROCEDURE — 80053 COMPREHEN METABOLIC PANEL: CPT

## 2022-03-29 PROCEDURE — 85610 PROTHROMBIN TIME: CPT

## 2022-03-29 PROCEDURE — 73552 X-RAY EXAM OF FEMUR 2/>: CPT

## 2022-03-29 RX ADMIN — IOPAMIDOL 90 ML: 755 INJECTION, SOLUTION INTRAVENOUS at 14:20

## 2022-03-29 ASSESSMENT — PAIN SCALES - GENERAL: PAINLEVEL_OUTOF10: 4

## 2022-03-29 ASSESSMENT — PAIN - FUNCTIONAL ASSESSMENT: PAIN_FUNCTIONAL_ASSESSMENT: FACES

## 2022-03-29 ASSESSMENT — ENCOUNTER SYMPTOMS: VOMITING: 0

## 2022-03-29 NOTE — ED NOTES
Upon review of medication list sent by [facility] pt has been given her klonopin and norco prescribed medications for the past 2 days.  Nursing home reported pt was lethargic from baseline the past 2 days      Riaz Beal RN  03/29/22 6987

## 2022-03-29 NOTE — ED PROVIDER NOTES
[facility] EMERGENCY DEPT  EMERGENCY DEPARTMENT ENCOUNTER      Pt Name: Pricilla Hernandez  MRN: [id number]  [place] 1945  Date of evaluation: 3/29/2022  Provider: Alicia Bradshaw MD    CHIEF COMPLAINT       Chief Complaint   Patient presents with    Fall     fall on sunday now has hematoma to back of head and into neck         HISTORY OF PRESENT ILLNESS   (Location/Symptom, Timing/Onset, Context/Setting, Quality, Duration, Modifying Factors, Severity)  Note limiting factors. Pricilla Hernandez is a 68 y.o. female who presents to the emergency department        Fall  The accident occurred 2 days ago. The fall occurred while standing. She fell from a height of 3 to 5 ft. She landed on a hard floor. The point of impact was the head and neck (chest, right hip). The pain is moderate. There was no entrapment after the fall. Pertinent negatives include no vomiting and no loss of consciousness. She has tried nothing for the symptoms. Nursing Notes were reviewed. REVIEW OF SYSTEMS    (2-9 systems for level 4, 10 or more for level 5)     Review of Systems   Unable to perform ROS: Patient nonverbal   Gastrointestinal: Negative for vomiting. Neurological: Negative for loss of consciousness. Except as noted above the remainder of the review of systems was reviewed and negative.        PAST MEDICAL HISTORY     Past Medical History:   Diagnosis Date    Anxiety     Arthritis     CAD (coronary artery disease)     Deaf     Delusional disorder ([address]     Diabetes mellitus ([address]     Dizziness     Dysphagia     Gastro-esophageal reflux     Giddiness     Hypertension     Lack of coordination     Major depressive disorder     Nonspeaking deaf     Pyelonephritis     Schizoaffective disorder ([address]     Severe intellectual disabilities          SURGICAL HISTORY       Past Surgical History:   Procedure Laterality Date    EYE SURGERY Bilateral          CURRENT MEDICATIONS       Discharge Medication List as of 3/29/2022  4:19 PM      CONTINUE these medications which have NOT CHANGED    Details   acetaminophen (TYLENOL) 500 MG tablet Take 500 mg by mouth every 4 hours as needed for PainHistorical Med      loperamide (IMODIUM) 2 MG capsule Take 2 mg by mouth every 8 hours as needed for DiarrheaHistorical Med      HYDROcodone-acetaminophen (NORCO) 5-325 MG per tablet Take 1 tablet by mouth nightly as needed for Pain (osteoarthritis of hip). Historical Med      insulin aspart (NOVOLOG) 100 UNIT/ML injection cartridge Inject 7 Units into the skin 2 times daily Do not give if pt does not eat breakfast or lunch; hold if bs less than 120Historical Med      !! sertraline (ZOLOFT) 25 MG tablet Take 25 mg by mouth every morningHistorical Med      clonazePAM (KLONOPIN) 0.5 MG tablet Take 1 tablet by mouth 3 times daily as needed for Anxiety for up to 3 days. , Disp-9 tablet, R-0Print      pantoprazole (PROTONIX) 40 MG tablet Historical Med      Glucose 15 GM/32ML GEL Take by mouth 2 times daily as neededHistorical Med      glucagon 1 MG injection Infuse intravenously onceHistorical Med      insulin glargine (BASAGLAR KWIKPEN) 100 UNIT/ML injection pen Inject 30 Units into the skin every morning Historical Med      hydroCHLOROthiazide (HYDRODIURIL) 25 MG tablet Take 25 mg by mouth dailyHistorical Med      aspirin EC 81 MG EC tablet Take 1 tablet by mouth daily, Disp-90 tablet, R-3Print      atorvastatin (LIPITOR) 40 MG tablet Take 1 tablet by mouth nightly, Disp-30 tablet, R-3Print      miconazole (MICOTIN) 2 % powder Apply topically 2 times daily. , Disp-45 g, R-1, Print      clopidogrel (PLAVIX) 75 MG tablet Take 1 tablet by mouth daily, Disp-30 tablet, R-3Print      insulin lispro (HUMALOG) 100 UNIT/ML injection vial Inject 7 Units into the skinHistorical Med      !! sertraline (ZOLOFT) 100 MG tablet Take 100 mg by mouth dailyHistorical Med      !! risperiDONE (RISPERDAL) 1 MG tablet Take 1.25 mg by mouth daily 0800Historical Med !! risperiDONE (RISPERDAL) 0.25 MG tablet Take 0.25 mg by mouth daily At 1400Historical Med      docusate sodium (COLACE) 100 MG capsule Take 100 mg by mouth 2 times dailyHistorical Med      lactulose (CHRONULAC) 10 GM/15ML solution Take 20 g by mouth dailyHistorical Med      simethicone (MYLICON) 80 MG chewable tablet Take 80 mg by mouth every 6 hours as needed for FlatulenceHistorical Med      metoprolol succinate (TOPROL XL) 50 MG extended release tablet Take 50 mg by mouth dailyHistorical Med      metFORMIN (GLUCOPHAGE-XR) 500 MG extended release tablet Take 1,000 mg by mouth 2 times daily Historical Med       !! - Potential duplicate medications found. Please discuss with provider. ALLERGIES     Patient has no known allergies. FAMILY HISTORY       Family History   Problem Relation Age of Onset    Heart Disease Father     Diabetes type 2  Sister           SOCIAL HISTORY       Social History     Socioeconomic History    Marital status: Single     Spouse name: None    Number of children: None    Years of education: None    Highest education level: None   Occupational History    None   Tobacco Use    Smoking status: Never Smoker    Smokeless tobacco: Never Used   Vaping Use    Vaping Use: Never used   Substance and Sexual Activity    Alcohol use: Never    Drug use: Never    Sexual activity: Never   Other Topics Concern    None   Social History Narrative    Advance Directive: Full Code    Health Care Proxy: her sister, Mrs. Miryam Young, [phone]     Social Determinants of Health     Financial Resource Strain:     Difficulty of Paying Living Expenses: Not on file   Food Insecurity:     Worried About Running Out of Food in the Last Year: Not on file    Servando of Food in the Last Year: Not on file   Transportation Needs:     Lack of Transportation (Medical): Not on file    Lack of Transportation (Non-Medical):  Not on file   Physical Activity:     Days of Exercise per Week: Not on file    Minutes of Exercise per Session: Not on file   Stress:     Feeling of Stress : Not on file   Social Connections:     Frequency of Communication with Friends and Family: Not on file    Frequency of Social Gatherings with Friends and Family: Not on file    Attends Latter day Services: Not on file    Active Member of [address] [place] [address] [employer] or Organizations: Not on file    Attends Club or Organization Meetings: Not on file    Marital Status: Not on file   Intimate Partner Violence:     Fear of Current or Ex-Partner: Not on file    Emotionally Abused: Not on file    Physically Abused: Not on file    Sexually Abused: Not on file   Housing Stability:     Unable to Pay for Housing in the Last Year: Not on file    Number of [place] in the Last Year: Not on file    Unstable Housing in the Last Year: Not on file       SCREENINGS         Terral Coma Scale  Eye Opening: Spontaneous  Best Verbal Response: Inappropriate words  Best Motor Response: Localizes pain  Giuseppe Coma Scale Score: 12                     CIWA Assessment  BP: (!) 141/67  Pulse: 81                 PHYSICAL EXAM    (up to 7 for level 4, 8 or more for level 5)     ED Triage Vitals   BP Temp Temp src Pulse Resp SpO2 Height Weight   03/29/22 1328 03/29/22 1332 -- 03/29/22 1332 03/29/22 1328 03/29/22 1332 -- 03/29/22 1332   98/74 97.8 °F (36.6 °C)  77 18 96 %  115 lb (52.2 kg)       Physical Exam  Vitals reviewed. Constitutional:       General: She is not in acute distress. Appearance: Normal appearance. HENT:      Head: Normocephalic. Nose: Nose normal.      Mouth/Throat:      Mouth: Mucous membranes are moist.   Eyes:      Extraocular Movements: Extraocular movements intact. Comments: Left pupil irregular in appearance   Neck:      Comments: Diffuse posterior neck bruising. Placing patient in a c-collar. Cardiovascular:      Rate and Rhythm: Normal rate. Pulses: Normal pulses.    Pulmonary:      Effort: Pulmonary effort is normal.   Abdominal:      Palpations: Abdomen is soft. Tenderness: There is no abdominal tenderness. Musculoskeletal:         General: No swelling. Cervical back: No tenderness. Comments: Right midthigh tenderness to palpation   Skin:     Capillary Refill: Capillary refill takes less than 2 seconds. Findings: Bruising present. Comments: Bruising to the posterior neck, anterior chest   Neurological:      Mental Status: She is alert. Comments: At reported mental baseline         DIAGNOSTIC RESULTS     EKG: All EKG's are interpreted by the Emergency Department Physician who either signs or Co-signs this chart in the absence of a cardiologist.        RADIOLOGY:   Non-plain film images such as CT, Ultrasound and MRI are read by the radiologist. Plain radiographic images are visualized and preliminarily interpreted by the emergency physician with the below findings:        Interpretation per the Radiologist below, if available at the time of this note:    CT CHEST W CONTRAST   Final Result   1. No findings in the chest to suggest traumatic involvement. No   pneumothorax. No fracture. 2. Chronic inflammatory changes as detailed above. No evidence of mass   or lymphadenopathy. A stable small nodule in the left lower lung. Signed by Dr Joseph Otero Contrast   Final Result   1. No evidence of acute osseous injury in the cervical spine. 2. Advanced cervical spine degenerative change. Signed by Dr Luisa Morales Contrast   Final Result   1. No acute intracranial process. 2. Occipital scalp contusion. No calvarial fracture. Signed by Dr Beatriz Banks (MIN 2 VIEWS)   Final Result   1. No femur fracture or joint subluxation.    Signed by Dr Hogan December            ED BEDSIDE ULTRASOUND:   Performed by ED Physician - none    LABS:  Labs Reviewed   CBC WITH AUTO DIFFERENTIAL - Abnormal; Notable for the following components: Result Value    Hemoglobin 10.1 (*)     Hematocrit 34.2 (*)     MCV 77.6 (*)     MCH 22.9 (*)     MCHC 29.5 (*)     RDW 16.5 (*)     MPV 9.1 (*)     All other components within normal limits   COMPREHENSIVE METABOLIC PANEL W/ REFLEX TO MG FOR LOW K - Abnormal; Notable for the following components:    Glucose 137 (*)     BUN 27 (*)     All other components within normal limits   URINALYSIS - Abnormal; Notable for the following components:    Leukocyte Esterase, Urine TRACE (*)     All other components within normal limits   MICROSCOPIC URINALYSIS - Abnormal; Notable for the following components:    Bacteria, UA NEGATIVE (*)     Crystals, UA NEG (*)     All other components within normal limits   PROTIME-INR   APTT       All other labs were within normal range or not returned as of this dictation. EMERGENCY DEPARTMENT COURSE and DIFFERENTIAL DIAGNOSIS/MDM:   Vitals:    Vitals:    03/29/22 1328 03/29/22 1332 03/29/22 1526   BP: 98/74  (!) 141/67   Pulse:  77 81   Resp: 18  18   Temp:  97.8 °F (36.6 °C)    SpO2:  96% 99%   Weight:  115 lb (52.2 kg)            MDM  Number of Diagnoses or Management Options  Contusion of left wrist, initial encounter  Contusion of neck, initial encounter  Contusion of right chest wall, initial encounter  Injury of head, initial encounter  Diagnosis management comments: Patient is a 51-year-old who presents after a reported fall. The fall was approximately 2 days ago. Head CT and neck CT not show evidence of acute injury. Chest CT is negative for acute injury. No signs of fracture on x-ray. History and exam is nonexistent with spinal cord compression or cauda equina. Patient at neurologic baseline per family. Patient was discharged back to the facility with her family.        Amount and/or Complexity of Data Reviewed  Clinical lab tests: ordered and reviewed  Tests in the radiology section of CPT®: ordered and reviewed    Patient Progress  Patient progress: stable        REASSESSMENT          CRITICAL CARE TIME   Total Critical Care time was 0 minutes, excluding separately reportable procedures. There was a high probability of clinically significant/life threatening deterioration in the patient's condition which required my urgent intervention. CONSULTS:  None    PROCEDURES:  Unless otherwise noted below, none     Procedures        FINAL IMPRESSION      1. Injury of head, initial encounter    2. Contusion of neck, initial encounter    3. Contusion of right chest wall, initial encounter    4. Contusion of left wrist, initial encounter          DISPOSITION/PLAN   DISPOSITION Decision To Discharge 03/29/2022 04:10:57 PM      PATIENT REFERRED TO:  Carri Guerra MD  . [address] [id number] [address]    Schedule an appointment as soon as possible for a visit       [facility] DEPT  [facility]  [phone]    As needed, If symptoms worsen      DISCHARGE MEDICATIONS:  Discharge Medication List as of 3/29/2022  4:19 PM        Controlled Substances Monitoring:     No flowsheet data found.     (Please note that portions of this note were completed with a voice recognition program.  Efforts were made to edit the dictations but occasionally words are mis-transcribed.)    Natalya Markham MD (electronically signed)  Attending Emergency Physician          Natalya Markham MD  03/29/22 9309

## 2022-08-04 ENCOUNTER — HOSPITAL ENCOUNTER (EMERGENCY)
Facility: HOSPITAL | Age: 77
Discharge: HOME | End: 2022-08-04
Attending: EMERGENCY MEDICINE | Admitting: EMERGENCY MEDICINE

## 2022-08-04 VITALS
RESPIRATION RATE: 20 BRPM | HEIGHT: 60 IN | WEIGHT: 121.1 LBS | BODY MASS INDEX: 23.78 KG/M2 | SYSTOLIC BLOOD PRESSURE: 137 MMHG | HEART RATE: 76 BPM | OXYGEN SATURATION: 97 % | TEMPERATURE: 97.7 F | DIASTOLIC BLOOD PRESSURE: 65 MMHG

## 2022-08-04 DIAGNOSIS — E16.2 HYPOGLYCEMIA: ICD-10-CM

## 2022-08-04 DIAGNOSIS — N39.0 URINARY TRACT INFECTION WITHOUT HEMATURIA, SITE UNSPECIFIED: Primary | ICD-10-CM

## 2022-08-04 LAB
ANION GAP SERPL CALCULATED.3IONS-SCNC: 11 MMOL/L (ref 5–15)
BACTERIA UR QL AUTO: ABNORMAL /HPF
BASOPHILS # BLD AUTO: 0.03 10*3/MM3 (ref 0–0.2)
BASOPHILS NFR BLD AUTO: 0.3 % (ref 0–1.5)
BILIRUB UR QL STRIP: NEGATIVE
BUN SERPL-MCNC: 18 MG/DL (ref 8–23)
BUN/CREAT SERPL: 23.7 (ref 7–25)
CALCIUM SPEC-SCNC: 9.7 MG/DL (ref 8.6–10.5)
CHLORIDE SERPL-SCNC: 101 MMOL/L (ref 98–107)
CLARITY UR: ABNORMAL
CO2 SERPL-SCNC: 27 MMOL/L (ref 22–29)
COLOR UR: YELLOW
CREAT SERPL-MCNC: 0.76 MG/DL (ref 0.57–1)
DEPRECATED RDW RBC AUTO: 42.4 FL (ref 37–54)
EGFRCR SERPLBLD CKD-EPI 2021: 80.8 ML/MIN/1.73
EOSINOPHIL # BLD AUTO: 0.07 10*3/MM3 (ref 0–0.4)
EOSINOPHIL NFR BLD AUTO: 0.7 % (ref 0.3–6.2)
ERYTHROCYTE [DISTWIDTH] IN BLOOD BY AUTOMATED COUNT: 18.9 % (ref 12.3–15.4)
GLUCOSE BLDC GLUCOMTR-MCNC: 163 MG/DL (ref 70–130)
GLUCOSE BLDC GLUCOMTR-MCNC: 207 MG/DL (ref 70–130)
GLUCOSE SERPL-MCNC: 220 MG/DL (ref 65–99)
GLUCOSE UR STRIP-MCNC: ABNORMAL MG/DL
HCT VFR BLD AUTO: 33.4 % (ref 34–46.6)
HGB BLD-MCNC: 10.5 G/DL (ref 12–15.9)
HGB UR QL STRIP.AUTO: ABNORMAL
HOLD SPECIMEN: NORMAL
HYALINE CASTS UR QL AUTO: ABNORMAL /LPF
IMM GRANULOCYTES # BLD AUTO: 0.04 10*3/MM3 (ref 0–0.05)
IMM GRANULOCYTES NFR BLD AUTO: 0.4 % (ref 0–0.5)
KETONES UR QL STRIP: NEGATIVE
LEUKOCYTE ESTERASE UR QL STRIP.AUTO: ABNORMAL
LYMPHOCYTES # BLD AUTO: 0.63 10*3/MM3 (ref 0.7–3.1)
LYMPHOCYTES NFR BLD AUTO: 6.2 % (ref 19.6–45.3)
MCH RBC QN AUTO: 21.5 PG (ref 26.6–33)
MCHC RBC AUTO-ENTMCNC: 31.4 G/DL (ref 31.5–35.7)
MCV RBC AUTO: 68.4 FL (ref 79–97)
MONOCYTES # BLD AUTO: 0.9 10*3/MM3 (ref 0.1–0.9)
MONOCYTES NFR BLD AUTO: 8.9 % (ref 5–12)
NEUTROPHILS NFR BLD AUTO: 8.42 10*3/MM3 (ref 1.7–7)
NEUTROPHILS NFR BLD AUTO: 83.5 % (ref 42.7–76)
NITRITE UR QL STRIP: POSITIVE
NRBC BLD AUTO-RTO: 0 /100 WBC (ref 0–0.2)
PH UR STRIP.AUTO: 6 [PH] (ref 5–9)
PLATELET # BLD AUTO: 169 10*3/MM3 (ref 140–450)
PMV BLD AUTO: 9.1 FL (ref 6–12)
POTASSIUM SERPL-SCNC: 3.8 MMOL/L (ref 3.5–5.2)
PROT UR QL STRIP: NEGATIVE
RBC # BLD AUTO: 4.88 10*6/MM3 (ref 3.77–5.28)
RBC # UR STRIP: ABNORMAL /HPF
REF LAB TEST METHOD: ABNORMAL
SODIUM SERPL-SCNC: 139 MMOL/L (ref 136–145)
SP GR UR STRIP: 1.01 (ref 1–1.03)
SQUAMOUS #/AREA URNS HPF: ABNORMAL /HPF
UROBILINOGEN UR QL STRIP: ABNORMAL
WBC # UR STRIP: ABNORMAL /HPF
WBC NRBC COR # BLD: 10.09 10*3/MM3 (ref 3.4–10.8)
WHOLE BLOOD HOLD COAG: NORMAL

## 2022-08-04 PROCEDURE — 81001 URINALYSIS AUTO W/SCOPE: CPT | Performed by: EMERGENCY MEDICINE

## 2022-08-04 PROCEDURE — 82962 GLUCOSE BLOOD TEST: CPT

## 2022-08-04 PROCEDURE — 80048 BASIC METABOLIC PNL TOTAL CA: CPT | Performed by: EMERGENCY MEDICINE

## 2022-08-04 PROCEDURE — 87186 SC STD MICRODIL/AGAR DIL: CPT | Performed by: EMERGENCY MEDICINE

## 2022-08-04 PROCEDURE — 87086 URINE CULTURE/COLONY COUNT: CPT | Performed by: EMERGENCY MEDICINE

## 2022-08-04 PROCEDURE — 87077 CULTURE AEROBIC IDENTIFY: CPT | Performed by: EMERGENCY MEDICINE

## 2022-08-04 PROCEDURE — 99284 EMERGENCY DEPT VISIT MOD MDM: CPT

## 2022-08-04 PROCEDURE — 85025 COMPLETE CBC W/AUTO DIFF WBC: CPT | Performed by: EMERGENCY MEDICINE

## 2022-08-04 RX ORDER — DEXTROSE MONOHYDRATE 25 G/50ML
50 INJECTION, SOLUTION INTRAVENOUS
Status: DISCONTINUED | OUTPATIENT
Start: 2022-08-04 | End: 2022-08-04 | Stop reason: HOSPADM

## 2022-08-04 RX ORDER — CEPHALEXIN 500 MG/1
500 CAPSULE ORAL 2 TIMES DAILY
Qty: 14 CAPSULE | Refills: 0 | Status: SHIPPED | OUTPATIENT
Start: 2022-08-04

## 2022-08-04 RX ORDER — CEPHALEXIN 500 MG/1
500 CAPSULE ORAL ONCE
Status: COMPLETED | OUTPATIENT
Start: 2022-08-04 | End: 2022-08-04

## 2022-08-04 RX ADMIN — CEPHALEXIN 500 MG: 500 CAPSULE ORAL at 15:11

## 2022-08-04 NOTE — ED PROVIDER NOTES
Subjective     Hypoglycemia  Initial blood sugar:  39  Blood sugar after intervention:  200  Severity:  Moderate  Onset quality:  Gradual  Duration:  1 day  Timing:  Constant  Progression:  Worsening  Chronicity:  New  Diabetic status:  Controlled with insulin  Current diabetic therapy:  Long and short acting insulin.  No recent dosage changes.  Time since last antidiabetic medication: A few hours.  Context: decreased oral intake    Context: not exercise, not ingestion, not new diabetes diagnosis, not recent illness and not treatment noncompliance    Relieved by:  Glucagon  Ineffective treatments:  None tried  Associated symptoms: altered mental status and decreased responsiveness    Associated symptoms: no seizures, no shortness of breath and no vomiting        Review of Systems   Constitutional: Positive for decreased responsiveness.   Respiratory: Negative for shortness of breath.    Gastrointestinal: Negative for vomiting.   Neurological: Negative for seizures.   All other systems reviewed and are negative.      Past Medical History:   Diagnosis Date   • Arthritis    • CHF (congestive heart failure) (HCC)    • Deaf    • Diabetes mellitus (HCC)    • Elevated cholesterol    • Hypertension    • Nonverbal        No Known Allergies    History reviewed. No pertinent surgical history.    History reviewed. No pertinent family history.    Social History     Socioeconomic History   • Marital status: Single   Tobacco Use   • Smoking status: Never Smoker           Objective   Physical Exam  Vitals and nursing note reviewed.   Constitutional:       Appearance: She is not ill-appearing.   HENT:      Head: Normocephalic and atraumatic.      Right Ear: External ear normal.      Left Ear: External ear normal.      Nose: Nose normal.      Mouth/Throat:      Mouth: Mucous membranes are moist.   Eyes:      General: No scleral icterus.  Cardiovascular:      Rate and Rhythm: Normal rate and regular rhythm.   Pulmonary:      Effort:  Pulmonary effort is normal.      Breath sounds: Normal breath sounds.   Abdominal:      General: Abdomen is flat.      Tenderness: There is no abdominal tenderness.   Musculoskeletal:         General: No signs of injury.   Skin:     General: Skin is warm and dry.   Neurological:      Mental Status: She is alert.      Comments: Alert.  Acting her baseline self per family.  Difficulty with hearing and seeing.   Psychiatric:         Behavior: Behavior normal.         Procedures           ED Course                                           MDM  Number of Diagnoses or Management Options     Amount and/or Complexity of Data Reviewed  Decide to obtain previous medical records or to obtain history from someone other than the patient: yes  Obtain history from someone other than the patient: yes  Review and summarize past medical records: yes        Final diagnoses:   Urinary tract infection without hematuria, site unspecified   Hypoglycemia       ED Disposition  ED Disposition     ED Disposition   Discharge    Condition   Stable    Comment   --             Katy Valdes MD  [address] [place], [place] [id number] [phone]    Call today  Appointment to discuss urinary tract infection as well as recent low blood sugars.    [facility] EMERGENCY DEPARTMENT  [address] [place] [id number] [phone]    As needed, If symptoms worsen at any time         Medication List      New Prescriptions    cephalexin 500 MG capsule  Commonly known as: KEFLEX  Take 1 capsule by mouth 2 (Two) Times a Day.        Stop    clonazePAM 0.5 MG tablet  Commonly known as: KlonoPIN     metFORMIN 1000 MG tablet  Commonly known as: GLUCOPHAGE           Where to Get Your Medications      You can get these medications from any pharmacy    Bring a paper prescription for each of these medications  · cephalexin 500 MG capsule          Jay Méndez DO  08/04/22 1802

## 2022-08-06 LAB — BACTERIA SPEC AEROBE CULT: ABNORMAL

## 2022-11-22 ENCOUNTER — HOSPITAL ENCOUNTER (EMERGENCY)
Facility: HOSPITAL | Age: 77
Discharge: HOME | End: 2022-11-22
Attending: STUDENT IN AN ORGANIZED HEALTH CARE EDUCATION/TRAINING PROGRAM | Admitting: STUDENT IN AN ORGANIZED HEALTH CARE EDUCATION/TRAINING PROGRAM

## 2022-11-22 ENCOUNTER — APPOINTMENT (OUTPATIENT)
Dept: CT IMAGING | Facility: HOSPITAL | Age: 77
End: 2022-11-22

## 2022-11-22 VITALS
OXYGEN SATURATION: 100 % | HEART RATE: 68 BPM | RESPIRATION RATE: 16 BRPM | DIASTOLIC BLOOD PRESSURE: 83 MMHG | HEIGHT: 64 IN | SYSTOLIC BLOOD PRESSURE: 130 MMHG | WEIGHT: 120 LBS | BODY MASS INDEX: 20.49 KG/M2

## 2022-11-22 DIAGNOSIS — H02.402 PTOSIS OF LEFT EYELID: Primary | ICD-10-CM

## 2022-11-22 PROCEDURE — 70450 CT HEAD/BRAIN W/O DYE: CPT

## 2022-11-22 PROCEDURE — 99283 EMERGENCY DEPT VISIT LOW MDM: CPT

## 2022-11-22 NOTE — DISCHARGE INSTRUCTIONS
Please defer to the POA for further management; will refer to palliative care. Come back for any emergencies you would like treated.

## 2022-11-22 NOTE — ED TRIAGE NOTES
Patient presents by EMS from [facility]. EMS states the nursing home called at 0830 and LKW per nursing home was around 0800AM this morning. EMS states that the nursing home states patient had left sided eye drooping but on their arrival symptoms had resolved. Glucose was 142 per ems.

## 2022-11-22 NOTE — ED PROVIDER NOTES
"Subjective   History of Present Illness   Patient presents due to concern for possible stroke.  Per EMS nursing report, someone at the nurse facility felt her left eye seemed droopy.  No other deficits reported.  EMS was transported the patient in the past and report that she seems normal to them and at her baseline.  Patient is mute and deaf and does not know American sign language.  She is able to communicate that she want something to eat and attempts to write \"something to eat\" on a piece of paper and is able to say the word waffle.    Review of Systems   Unable to perform ROS: Patient nonverbal       Past Medical History:   Diagnosis Date   • Arthritis    • CHF (congestive heart failure) ([facility])    • Deaf    • Diabetes mellitus (HCC)    • Elevated cholesterol    • Hypertension    • Nonverbal        No Known Allergies    History reviewed. No pertinent surgical history.    History reviewed. No pertinent family history.    Social History     Socioeconomic History   • Marital status: Single   Tobacco Use   • Smoking status: Never           Objective   Physical Exam  Vitals reviewed.   Constitutional:       General: She is not in acute distress.  HENT:      Head: Normocephalic and atraumatic.   Eyes:      Extraocular Movements: Extraocular movements intact.      Conjunctiva/sclera: Conjunctivae normal.   Cardiovascular:      Pulses: Normal pulses.      Heart sounds: Normal heart sounds.   Pulmonary:      Effort: Pulmonary effort is normal. No respiratory distress.      Breath sounds: No wheezing.   Abdominal:      General: Abdomen is flat. There is no distension.      Tenderness: There is no abdominal tenderness. There is no guarding.   Musculoskeletal:      Cervical back: Normal range of motion and neck supple.   Skin:     General: Skin is warm and dry.   Neurological:      General: No focal deficit present.      Mental Status: She is alert. Mental status is at baseline.      Comments: DORIAN, full strength with " handgrip and push/pull. Walks normally unassisted no ataxia. Writes without ataxia although per family doesn't know how to write well so does not write well. Asks for a waffle. Symmetric facial raise. Symmetric palatal elevation. No strength deficit in all four extremities. Unable to do sensory exam due to communication barrier. No nystagmus on passive EOMI but cannot understand my communication to follow my finger. Baseline esotropia.   Psychiatric:         Behavior: Behavior normal.         Thought Content: Thought content normal.         Procedures           ED Course  ED Course as of 11/22/22 1156   Tue Nov 22, 2022   1004 Spoke with BUD Ferrari, [phone]. Disclosed full H&P. Discussed DNR status. Discussed we can initiate stroke pathway d/t difficult NIHSS eval with her baseline status although nothing seems frankly new to me or EMS. They do not think she would want a thrombectomy after I describe it and they do not think she would want tPA right now given the risks and clinical status and while I communicated that I cannot rule out stroke given her communication limitations and my H&P, they do not want to pursue aggressive stroke workup and tx and do not think the patient would want this.  We agreed to a joint evaluation as soon as they arrive which will be sent according to them. [AS]      ED Course User Index  [AS] Keith Tineo MD                                           [facility]   Melissa Melisa Parham is a 77 y.o. female with PMH above who presents to the Emergency Department with possible stroke. H&P not concerning for stroke. Best NIHSS 0 given that her mute status is baseline although my exam is limited as per above. Doubt tPA would be beneficial. See ED course above. Will do basic workup and evaluate with family.  CT head with NAICA  POA came to bedside.  Underwent evaluation the POA really feels the patient is completely at her baseline and does not have concern for stroke. She  doesn't note facial droop on joint exam either. Patient continues to want to eat and she wants pulse ox off.  We had extensive discussion about the patient's goals of care and whether or not aggressive care and work-up of a stroke would be beneficial to her at this point.  The healthcare power of  feels that the patient would not desire further work-up at this time and would like to take her out to eat since she is hungry.  After a long discussion of different care options; full code, DNR, limited scope of treatment, comfort care, healthcare power of  seems to think that the best interest of the patient will be limited scope of treatment and to only bring her to the hospital for clearly reversible problems but that at her age and state of health the patient would probably not like to come to the hospital for any extensive or complicated care.  For this reason I canceled laboratory studies, do not think stroke needs further work-up here today based on the patient's goals of care, and discharged in stable condition with return precautions.      Final diagnosis: eyelid droop.    All questions answered. Patient/family was understanding and in agreement with today's assessment and plan. The patient was monitored during their stay in the ED and dispositioned without acute event.    Electronically signed by:  Keith Tineo MD 11/22/2022 11:56 CST      Note: Dragon medical dictation software was used in the creation of this note.        Final diagnoses:   Ptosis of left eyelid       ED Disposition  ED Disposition     ED Disposition   Discharge    Condition   Stable    Comment   --             Ceasar Bowens MD               Medication List      No changes were made to your prescriptions during this visit.          Keith Tineo MD  11/22/22 3718